# Patient Record
Sex: FEMALE | Race: BLACK OR AFRICAN AMERICAN | Employment: FULL TIME | URBAN - METROPOLITAN AREA
[De-identification: names, ages, dates, MRNs, and addresses within clinical notes are randomized per-mention and may not be internally consistent; named-entity substitution may affect disease eponyms.]

---

## 2017-08-11 ENCOUNTER — OFFICE VISIT (OUTPATIENT)
Dept: INTERNAL MEDICINE CLINIC | Age: 32
End: 2017-08-11

## 2017-08-11 VITALS
WEIGHT: 171 LBS | DIASTOLIC BLOOD PRESSURE: 62 MMHG | TEMPERATURE: 98.6 F | SYSTOLIC BLOOD PRESSURE: 110 MMHG | OXYGEN SATURATION: 99 % | BODY MASS INDEX: 30.3 KG/M2 | HEART RATE: 88 BPM | RESPIRATION RATE: 16 BRPM | HEIGHT: 63 IN

## 2017-08-11 DIAGNOSIS — Z98.84 S/P GASTRIC BYPASS: ICD-10-CM

## 2017-08-11 DIAGNOSIS — F51.02 ADJUSTMENT INSOMNIA: ICD-10-CM

## 2017-08-11 DIAGNOSIS — R23.4 SKIN TEXTURE CHANGES: ICD-10-CM

## 2017-08-11 DIAGNOSIS — Z00.00 WELL WOMAN EXAM (NO GYNECOLOGICAL EXAM): Primary | ICD-10-CM

## 2017-08-11 RX ORDER — LANOLIN ALCOHOL/MO/W.PET/CERES
325 CREAM (GRAM) TOPICAL
Qty: 30 TAB | Refills: 3 | Status: SHIPPED | OUTPATIENT
Start: 2017-08-11 | End: 2017-08-17

## 2017-08-11 RX ORDER — CHOLECALCIFEROL (VITAMIN D3) 125 MCG
1 CAPSULE ORAL DAILY
Qty: 30 TAB | Refills: 3 | Status: SHIPPED | OUTPATIENT
Start: 2017-08-11 | End: 2017-08-17

## 2017-08-11 RX ORDER — LANOLIN ALCOHOL/MO/W.PET/CERES
500 CREAM (GRAM) TOPICAL DAILY
Qty: 30 TAB | Refills: 3 | Status: SHIPPED | OUTPATIENT
Start: 2017-08-11 | End: 2017-08-17

## 2017-08-11 RX ORDER — PEDIATRIC MULTIVITAMIN NO.17
1 TABLET,CHEWABLE ORAL DAILY
Qty: 30 TAB | Refills: 3 | Status: SHIPPED | OUTPATIENT
Start: 2017-08-11 | End: 2017-08-17

## 2017-08-11 NOTE — PROGRESS NOTES
Ronald Saenz is a 27 y.o. female  Chief Complaint   Patient presents with   [address] Patient     1. Have you been to the ER, urgent care clinic since your last visit? Hospitalized since your last visit? No    2. Have you seen or consulted any other health care providers outside of the [address] [place] [address] since your last visit? Include any pap smears or colon screening.  No

## 2017-08-11 NOTE — MR AVS SNAPSHOT
Visit Information Date & Time Provider Department Dept. Phone Encounter #  
 8/11/2017 11:00 AM Nicole Guzman MD [facility] Internal Medicine [phone] [id number] Follow-up Instructions Return in about 3 months (around 11/11/2017) for Follow-up, Skin Changes &  Insomnia. Your Appointments 8/29/2017  8:30 AM  
[facility] Appt Note: n/p est pcp/cpe/fasting labs 07/03/2017[address] [place] [address]  
[address], [address] [address] Upcoming Health Maintenance Date Due INFLUENZA AGE 9 TO ADULT 8/1/2017 PAP AKA CERVICAL CYTOLOGY 8/11/2019 DTaP/Tdap/Td series (2 - Td) 9/18/2022 Allergies as of 8/11/2017  Review Complete On: 8/11/2017 By: Nicole Guzman MD  
 No Known Allergies Current Immunizations  Reviewed on 9/18/2012 Name Date TDAP Vaccine 9/18/2012 Not reviewed this visit You Were Diagnosed With   
  
 Codes Comments Well woman exam (no gynecological exam)    -  Primary ICD-10-CM: Z00.00 ICD-9-CM: V70.0 S/P gastric bypass     ICD-10-CM: G12.74 ICD-9-CM: V45.86 Skin texture changes     ICD-10-CM: R23.4 ICD-9-CM: 782.8 Adjustment insomnia     ICD-10-CM: F51.02 
ICD-9-CM: 307.41 Vitals BP Pulse Temp Resp Height(growth percentile) Weight(growth percentile) 110/62 (BP 1 Location: Right arm, BP Patient Position: Sitting) 88 98.6 °F (37 °C) (Oral) 16 5' 3\" (1.6 m) 171 lb (77.6 kg) LMP SpO2 BMI OB Status Smoking Status 07/31/2017 99% 30.29 kg/m2 Having regular periods Former Smoker BMI and BSA Data Body Mass Index Body Surface Area  
 30.29 kg/m 2 1.86 m 2 Preferred Pharmacy Pharmacy Name Phone [address] [facility], [address] [phone] Your Updated Medication List  
  
   
This list is accurate as of: 8/11/17 12:19 PM.  Always use your most recent med list.  
  
  
  
  
 ascorbic acid (vitamin C) 500 mg Chew Commonly known as:  VITAMIN C Take 1 Tab by mouth daily. cholecalciferol (vitamin D3) 2,000 unit Tab Commonly known as:  VITAMIN D3 Take 1 Tab by mouth daily. ferrous sulfate 325 mg (65 mg iron) tablet Commonly known as:  Iron (Ferrous Sulfate) Take 1 Tab by mouth Daily (before breakfast). MONONESSA (28) 0.25-35 mg-mcg Tab Generic drug:  norgestimate-ethinyl estradiol Take 1 Tab by mouth daily. pediatric multivitamins chewable tablet Take 1 Tab by mouth daily. Prescriptions Sent to Pharmacy Refills  
 pediatric multivitamins chewable tablet 3 Sig: Take 1 Tab by mouth daily. Class: Normal  
 Pharmacy: [facility], [address] AT [address] [place] [address] Ph #: [phone] Route: Oral  
 cholecalciferol, vitamin D3, (VITAMIN D3) 2,000 unit tab 3 Sig: Take 1 Tab by mouth daily. Class: Normal  
 Pharmacy: [facility], [address] AT [address] [place] [address] Ph #: [phone] Route: Oral  
 ferrous sulfate (IRON, FERROUS SULFATE,) 325 mg (65 mg iron) tablet 3 Sig: Take 1 Tab by mouth Daily (before breakfast). Class: Normal  
 Pharmacy: [facility], [address] AT [address] [place] [address] Ph #: [phone] Route: Oral  
 ascorbic acid, vitamin C, (VITAMIN C) 500 mg chew 3 Sig: Take 1 Tab by mouth daily. Class: Normal  
 Pharmacy: [facility], [address] AT [address] [place] [address] Ph #: [phone] Route: Oral  
  
We Performed the Following ELIZABET IFA W/REFLEX  CASCADE [35646 CPT(R)] CBC WITH AUTOMATED DIFF [68809 CPT(R)] FERRITIN [39057 CPT(R)] IRON PROFILE [id number] CPT(R)] METABOLIC PANEL, COMPREHENSIVE [12395 CPT(R)] REFERRAL TO SURGERY [WRN506 Custom] Comments:  
 Please evaluate patient for bariatric follow-up; need nutrition THYROID PANEL [id number] CPT(R)] TSH 3RD GENERATION [68665 CPT(R)] VITAMIN B1, WHOLE BLOOD [id number] CPT(R)] VITAMIN B12 & FOLATE [03092 CPT(R)] VITAMIN C, PLASMA [86111 CPT(R)] VITAMIN D, 25 HYDROXY [id number] CPT(R)] ZINC [id number] CPT(R)] Follow-up Instructions Return in about 3 months (around 11/11/2017) for Follow-up, Skin Changes &  Insomnia. Referral Information Referral ID Referred By Referred To  
  
 [id number] [facility]   
   [address], [address] [place] [address] Visits Status Start Date End Date 1 New Request 8/11/17 8/11/18 If your referral has a status of pending review or denied, additional information will be sent to support the outcome of this decision. Patient Instructions It was a pleasure to meet you! As discussed: 
 
Health Maintenance I have ordered your age appropriate labs please complete them. You will need to fast 10-12hrs before your appointment. Remember goal for exercise is 150minutes of moderate exercise a week and diet goal is to eat 50% fruits and vegetables with minimal sugar, fat and oil daily. See health.gov or choosemyplate.gov for more details. Your cervical cancer and breast cancer screening will be completed by your ob/ gyn as scheduled. Bariatric Surgery I have ordered labs to check your symptoms Schedule with the nutritionist  
Restart the vitamins ordered. Insomnia Improve sleep hygiene (see below). Try Diphenhydramine 25-50mg 2 hours before bedtime. If the Insomnia continues then we will discuss other options. Learning About Sleeping Well What does sleeping well mean? Sleeping well means getting enough sleep.  How much sleep is enough varies among people. The number of hours you sleep is not as important as how you feel when you wake up. If you do not feel refreshed, you probably need more sleep. Another sign of not getting enough sleep is feeling tired during the day. The average total nightly sleep time is 7½ to 8 hours. Healthy adults may need a little more or a little less than this. Why is getting enough sleep important? Getting enough quality sleep is a basic part of good health. When your sleep suffers, your mood and your thoughts can suffer too. You may find yourself feeling more grumpy or stressed. Not getting enough sleep also can lead to serious problems, including injury, accidents, anxiety, and depression. What might cause poor sleeping? Many things can cause sleep problems, including: · Stress. Stress can be caused by fear about a single event, such as giving a speech. Or you may have ongoing stress, such as worry about work or school. · Depression, anxiety, and other mental or emotional conditions. · Changes in your sleep habits or surroundings. This includes changes that happen where you sleep, such as noise, light, or sleeping in a different bed. It also includes changes in your sleep pattern, such as having jet lag or working a late shift. · Health problems, such as pain, breathing problems, and restless legs syndrome. · Lack of regular exercise. How can you help yourself? Here are some tips that may help you sleep more soundly and wake up feeling more refreshed. Your sleeping area · Use your bedroom only for sleeping and sex. A bit of light reading may help you fall asleep. But if it doesn't, do your reading elsewhere in the house. Don't watch TV in bed. · Be sure your bed is big enough to stretch out comfortably, especially if you have a sleep partner. · Keep your bedroom quiet, dark, and cool. Use curtains, blinds, or a sleep mask to block out light.  To block out noise, use earplugs, soothing music, or a \"white noise\" machine. Your evening and bedtime routine · Create a relaxing bedtime routine. You might want to take a warm shower or bath, listen to soothing music, or drink a cup of noncaffeinated tea. · Go to bed at the same time every night. And get up at the same time every morning, even if you feel tired. What to avoid · Limit caffeine (coffee, tea, caffeinated sodas) during the day, and don't have any for at least 4 to 6 hours before bedtime. · Don't drink alcohol before bedtime. Alcohol can cause you to wake up more often during the night. · Don't smoke or use tobacco, especially in the evening. Nicotine can keep you awake. · Don't take naps during the day, especially close to bedtime. · Don't lie in bed awake for too long. If you can't fall asleep, or if you wake up in the middle of the night and can't get back to sleep within 15 minutes or so, get out of bed and go to another room until you feel sleepy. · Don't take medicine right before bed that may keep you awake or make you feel hyper or energized. Your doctor can tell you if your medicine may do this and if you can take it earlier in the day. If you can't sleep · Imagine yourself in a peaceful, pleasant scene. Focus on the details and feelings of being in a place that is relaxing. · Get up and do a quiet or boring activity until you feel sleepy. · Don't drink any liquids after 6 p.m. if you wake up often because you have to go to the bathroom. Where can you learn more? Go to http://aysha-sherron.info/. Enter F195 in the search box to learn more about \"Learning About Sleeping Well. \" Current as of: July 26, 2016 Content Version: 11.3 © 6750-3825 [employer]. Care instructions adapted under license by [employer] (which disclaims liability or warranty for this information).  If you have questions about a medical condition or this instruction, always ask your healthcare professional. [place] [address] any warranty or liability for your use of this information. Laparoscopic Gastric Banding: What to Expect at Home Your Recovery Laparoscopic gastric banding is surgery to make the stomach smaller. It is done to help people lose weight. The surgery limits the amount of food the stomach can hold. This helps you eat less and feel full sooner. The cuts (incisions) the doctor made in your belly will probably be sore for a few days after the surgery. If you have stitches, the doctor will take these out at your follow-up visit. After this surgery, weight loss is usually gradual but steady. You will have regular appointments with your doctor to check how you are doing. The doctor can adjust the band if you are not losing weight as expected, or if you have problems with the band. Some people continue to lose weight for 4 or 5 years after surgery. It is important to think of this surgery as a tool to help you lose weight. It is not an instant fix. You will still need to eat a healthy diet and get regular exercise. This will help you reach your weight goal and avoid regaining the weight you lose. It is common to have many different emotions after this surgery. You may feel happy or excited as you begin to lose weight. But you may also feel overwhelmed or frustrated by the changes that you have to make in your diet, activity, and lifestyle. Talk with your doctor if you have concerns or questions. This care sheet gives you a general idea about how long it will take for you to recover. But each person recovers at a different pace. Follow the steps below to get better as quickly as possible. How can you care for yourself at home? Activity · Rest when you feel tired. Getting enough sleep will help you recover. · Try to walk each day.  Start by walking a little more than you did the day before. Bit by bit, increase the amount you walk. Walking boosts blood flow and helps prevent pneumonia and constipation. · Avoid strenuous activities, such as bicycle riding, jogging, weight lifting, or aerobic exercise, until your doctor says it is okay. · Until your doctor says it is okay, avoid lifting anything that would make you strain. This may include a child, heavy grocery bags and milk containers, a heavy briefcase or backpack, cat litter or dog food bags, or a vacuum . · Hold a pillow over your incisions when you cough or take deep breaths. This will support your belly and decrease your pain. · Do breathing exercises at home as instructed by your doctor. This will help prevent pneumonia. · Ask your doctor when you can drive again. · You will probably need to take 2 to 4 weeks off from work. It depends on the type of work you do and how you feel. · You may shower, if your doctor okays it. Pat the incisions dry. Do not take a bath for the first 2 weeks, or until your doctor tells you it is okay. · Ask your doctor when it is okay for you to have sex. Diet · Your doctor will give you specific instructions about what to eat after the surgery. For the first 2 weeks, you will need to follow a liquid or soft diet. Bit by bit, you will be able to add solid foods back into your diet. · Your doctor may recommend that you work with a dietitian to plan healthy meals that give you enough protein, vitamins, and minerals while you are losing weight. Even with a healthy diet, you probably will need to take vitamin and mineral supplements for the rest of your life. · At first you may feel full after just a few sips of water or other liquid. It is important to try to sip water throughout the day to avoid becoming dehydrated. · You may notice that your bowel movements are not regular right after your surgery. This is common. Try to avoid constipation and straining with bowel movements. Medicines · Your doctor will tell you if and when you can restart your medicines. He or she will also give you instructions about taking any new medicines. · If you take blood thinners, such as warfarin (Coumadin), clopidogrel (Plavix), or aspirin, be sure to talk to your doctor. He or she will tell you if and when to start taking those medicines again. Make sure that you understand exactly what your doctor wants you to do. · Be safe with medicines. Take pain medicines exactly as directed. ¨ If the doctor gave you a prescription medicine for pain, take it as prescribed. ¨ If you are not taking a prescription pain medicine, ask your doctor if you can take an over-the-counter medicine. · If you think your pain medicine is making you sick to your stomach: 
¨ Take your medicine after meals (unless your doctor has told you not to). ¨ Ask your doctor for a different pain medicine. · If your doctor prescribed antibiotics, take them as directed. Do not stop taking them just because you feel better. You need to take the full course of antibiotics. Incision care · If you have strips of tape on the incisions, leave the tape on for a week or until it falls off. · Wash the area daily with warm, soapy water, and pat it dry. Don't use hydrogen peroxide or alcohol, which can slow healing. You may cover the area with a gauze bandage if it weeps or rubs against clothing. Change the bandage every day. · Keep the area clean and dry. Follow-up care is a key part of your treatment and safety. Be sure to make and go to all appointments, and call your doctor if you are having problems. It's also a good idea to know your test results and keep a list of the medicines you take. When should you call for help? Call 911 anytime you think you may need emergency care. For example, call if: 
· You passed out (lost consciousness). · You are short of breath. Call your doctor now or seek immediate medical care if: · You have pain that does not get better after you take pain medicine. · You cannot pass stool or gas. · You are sick to your stomach and cannot drink fluids. · You have loose stitches, or your incision comes open. · You have signs of a blood clot, such as: 
¨ Pain in your calf, back of the knee, thigh, or groin. ¨ Redness and swelling in your leg or groin. · You have signs of infection, such as: 
¨ Increased pain, swelling, warmth, or redness. ¨ Red streaks leading from the incision. ¨ Pus draining from the incision. ¨ A fever. Watch closely for changes in your health, and be sure to contact your doctor if you have any problems. Where can you learn more? Go to http://aysha-sherron.info/. Enter U011 in the search box to learn more about \"Laparoscopic Gastric Banding: What to Expect at Home. \" Current as of: October 13, 2016 Content Version: 11.3 © 7134-8584 [employer]. Care instructions adapted under license by [employer] (which disclaims liability or warranty for this information). If you have questions about a medical condition or this instruction, always ask your healthcare professional. [place] [address] any warranty or liability for your use of this information. Introducing [facility]! Dear Sadaf Parker: Thank you for requesting a Glipho account. Our records indicate that you already have an active Glipho account. You can access your account anytime at [email or web address] Did you know that you can access your hospital and ER discharge instructions at any time in Glipho? You can also review all of your test results from your hospital stay or ER visit. Additional Information If you have questions, please visit the Frequently Asked Questions section of the Glipho website at [email or web address] Remember, Glipho is NOT to be used for urgent needs.  For medical emergencies, dial 911. Now available from your iPhone and Android! Please provide this summary of care documentation to your next provider. Your primary care clinician is listed as James Cool. If you have any questions after today's visit, please call [phone].

## 2017-08-11 NOTE — PATIENT INSTRUCTIONS
It was a pleasure to meet you! As discussed:    Health Maintenance   I have ordered your age appropriate labs please complete them. You will need to fast 10-12hrs before your appointment. Remember goal for exercise is 150minutes of moderate exercise a week and diet goal is to eat 50% fruits and vegetables with minimal sugar, fat and oil daily. See health.gov or choosemyplate.gov for more details. Your cervical cancer and breast cancer screening will be completed by your ob/ gyn as scheduled. Bariatric Surgery  I have ordered labs to check your symptoms  Schedule with the nutritionist   Restart the vitamins ordered. Insomnia  Improve sleep hygiene (see below). Try Diphenhydramine 25-50mg 2 hours before bedtime. If the Insomnia continues then we will discuss other options. Learning About Sleeping Well  What does sleeping well mean? Sleeping well means getting enough sleep. How much sleep is enough varies among people. The number of hours you sleep is not as important as how you feel when you wake up. If you do not feel refreshed, you probably need more sleep. Another sign of not getting enough sleep is feeling tired during the day. The average total nightly sleep time is 7½ to 8 hours. Healthy adults may need a little more or a little less than this. Why is getting enough sleep important? Getting enough quality sleep is a basic part of good health. When your sleep suffers, your mood and your thoughts can suffer too. You may find yourself feeling more grumpy or stressed. Not getting enough sleep also can lead to serious problems, including injury, accidents, anxiety, and depression. What might cause poor sleeping? Many things can cause sleep problems, including:  · Stress. Stress can be caused by fear about a single event, such as giving a speech. Or you may have ongoing stress, such as worry about work or school. · Depression, anxiety, and other mental or emotional conditions.   · Changes in your sleep habits or surroundings. This includes changes that happen where you sleep, such as noise, light, or sleeping in a different bed. It also includes changes in your sleep pattern, such as having jet lag or working a late shift. · Health problems, such as pain, breathing problems, and restless legs syndrome. · Lack of regular exercise. How can you help yourself? Here are some tips that may help you sleep more soundly and wake up feeling more refreshed. Your sleeping area  · Use your bedroom only for sleeping and sex. A bit of light reading may help you fall asleep. But if it doesn't, do your reading elsewhere in the house. Don't watch TV in bed. · Be sure your bed is big enough to stretch out comfortably, especially if you have a sleep partner. · Keep your bedroom quiet, dark, and cool. Use curtains, blinds, or a sleep mask to block out light. To block out noise, use earplugs, soothing music, or a \"white noise\" machine. Your evening and bedtime routine  · Create a relaxing bedtime routine. You might want to take a warm shower or bath, listen to soothing music, or drink a cup of noncaffeinated tea. · Go to bed at the same time every night. And get up at the same time every morning, even if you feel tired. What to avoid  · Limit caffeine (coffee, tea, caffeinated sodas) during the day, and don't have any for at least 4 to 6 hours before bedtime. · Don't drink alcohol before bedtime. Alcohol can cause you to wake up more often during the night. · Don't smoke or use tobacco, especially in the evening. Nicotine can keep you awake. · Don't take naps during the day, especially close to bedtime. · Don't lie in bed awake for too long. If you can't fall asleep, or if you wake up in the middle of the night and can't get back to sleep within 15 minutes or so, get out of bed and go to another room until you feel sleepy.   · Don't take medicine right before bed that may keep you awake or make you feel hyper or energized. Your doctor can tell you if your medicine may do this and if you can take it earlier in the day. If you can't sleep  · Imagine yourself in a peaceful, pleasant scene. Focus on the details and feelings of being in a place that is relaxing. · Get up and do a quiet or boring activity until you feel sleepy. · Don't drink any liquids after 6 p.m. if you wake up often because you have to go to the bathroom. Where can you learn more? Go to http://aysha-sherron.info/. Enter S003 in the search box to learn more about \"Learning About Sleeping Well. \"  Current as of: July 26, 2016  Content Version: 11.3  © 0427-5714 [employer]. Care instructions adapted under license by [employer] (which disclaims liability or warranty for this information). If you have questions about a medical condition or this instruction, always ask your healthcare professional. [place] [address] any warranty or liability for your use of this information. Laparoscopic Gastric Banding: What to Expect at Home  Your Recovery  Laparoscopic gastric banding is surgery to make the stomach smaller. It is done to help people lose weight. The surgery limits the amount of food the stomach can hold. This helps you eat less and feel full sooner. The cuts (incisions) the doctor made in your belly will probably be sore for a few days after the surgery. If you have stitches, the doctor will take these out at your follow-up visit. After this surgery, weight loss is usually gradual but steady. You will have regular appointments with your doctor to check how you are doing. The doctor can adjust the band if you are not losing weight as expected, or if you have problems with the band. Some people continue to lose weight for 4 or 5 years after surgery. It is important to think of this surgery as a tool to help you lose weight. It is not an instant fix.  You will still need to eat a healthy diet and get regular exercise. This will help you reach your weight goal and avoid regaining the weight you lose. It is common to have many different emotions after this surgery. You may feel happy or excited as you begin to lose weight. But you may also feel overwhelmed or frustrated by the changes that you have to make in your diet, activity, and lifestyle. Talk with your doctor if you have concerns or questions. This care sheet gives you a general idea about how long it will take for you to recover. But each person recovers at a different pace. Follow the steps below to get better as quickly as possible. How can you care for yourself at home? Activity  · Rest when you feel tired. Getting enough sleep will help you recover. · Try to walk each day. Start by walking a little more than you did the day before. Bit by bit, increase the amount you walk. Walking boosts blood flow and helps prevent pneumonia and constipation. · Avoid strenuous activities, such as bicycle riding, jogging, weight lifting, or aerobic exercise, until your doctor says it is okay. · Until your doctor says it is okay, avoid lifting anything that would make you strain. This may include a child, heavy grocery bags and milk containers, a heavy briefcase or backpack, cat litter or dog food bags, or a vacuum . · Hold a pillow over your incisions when you cough or take deep breaths. This will support your belly and decrease your pain. · Do breathing exercises at home as instructed by your doctor. This will help prevent pneumonia. · Ask your doctor when you can drive again. · You will probably need to take 2 to 4 weeks off from work. It depends on the type of work you do and how you feel. · You may shower, if your doctor okays it. Pat the incisions dry. Do not take a bath for the first 2 weeks, or until your doctor tells you it is okay. · Ask your doctor when it is okay for you to have sex.   Diet  · Your doctor will give you specific instructions about what to eat after the surgery. For the first 2 weeks, you will need to follow a liquid or soft diet. Bit by bit, you will be able to add solid foods back into your diet. · Your doctor may recommend that you work with a dietitian to plan healthy meals that give you enough protein, vitamins, and minerals while you are losing weight. Even with a healthy diet, you probably will need to take vitamin and mineral supplements for the rest of your life. · At first you may feel full after just a few sips of water or other liquid. It is important to try to sip water throughout the day to avoid becoming dehydrated. · You may notice that your bowel movements are not regular right after your surgery. This is common. Try to avoid constipation and straining with bowel movements. Medicines  · Your doctor will tell you if and when you can restart your medicines. He or she will also give you instructions about taking any new medicines. · If you take blood thinners, such as warfarin (Coumadin), clopidogrel (Plavix), or aspirin, be sure to talk to your doctor. He or she will tell you if and when to start taking those medicines again. Make sure that you understand exactly what your doctor wants you to do. · Be safe with medicines. Take pain medicines exactly as directed. ¨ If the doctor gave you a prescription medicine for pain, take it as prescribed. ¨ If you are not taking a prescription pain medicine, ask your doctor if you can take an over-the-counter medicine. · If you think your pain medicine is making you sick to your stomach:  ¨ Take your medicine after meals (unless your doctor has told you not to). ¨ Ask your doctor for a different pain medicine. · If your doctor prescribed antibiotics, take them as directed. Do not stop taking them just because you feel better. You need to take the full course of antibiotics.   Incision care  · If you have strips of tape on the incisions, leave the tape on for a week or until it falls off. · Wash the area daily with warm, soapy water, and pat it dry. Don't use hydrogen peroxide or alcohol, which can slow healing. You may cover the area with a gauze bandage if it weeps or rubs against clothing. Change the bandage every day. · Keep the area clean and dry. Follow-up care is a key part of your treatment and safety. Be sure to make and go to all appointments, and call your doctor if you are having problems. It's also a good idea to know your test results and keep a list of the medicines you take. When should you call for help? Call 911 anytime you think you may need emergency care. For example, call if:  · You passed out (lost consciousness). · You are short of breath. Call your doctor now or seek immediate medical care if:  · You have pain that does not get better after you take pain medicine. · You cannot pass stool or gas. · You are sick to your stomach and cannot drink fluids. · You have loose stitches, or your incision comes open. · You have signs of a blood clot, such as:  ¨ Pain in your calf, back of the knee, thigh, or groin. ¨ Redness and swelling in your leg or groin. · You have signs of infection, such as:  ¨ Increased pain, swelling, warmth, or redness. ¨ Red streaks leading from the incision. ¨ Pus draining from the incision. ¨ A fever. Watch closely for changes in your health, and be sure to contact your doctor if you have any problems. Where can you learn more? Go to http://aysha-sherron.info/. Enter U011 in the search box to learn more about \"Laparoscopic Gastric Banding: What to Expect at Home. \"  Current as of: October 13, 2016  Content Version: 11.3  © 9981-1966 [employer]. Care instructions adapted under license by [employer] (which disclaims liability or warranty for this information).  If you have questions about a medical condition or this instruction, always ask your healthcare professional. [address] any warranty or liability for your use of this information.

## 2017-08-11 NOTE — PROGRESS NOTES
HISTORY OF PRESENT ILLNESS  Tiffanie Medina is a 27 y.o. female. HPI  New Patient  Tiffanie Medina is a new patient. Prior care:  She has not seen Dr. Renee Collins in several years. Has been seeing her gynecologist for primary care. Her gynecologist Dr. Kesha Gage. 8/2017. Records have been requested. ER Visits/ Hospitalizations: None     Health Maintenance  Breast Cancer screening: h/o breast biopsies 10 yrs ago. No FHx Breast CA   Colorectal Cancer screening: Not indicated. Cervical Cancer screening: Up to date, completed by gynecologist.      Cardiovascular Review:  The patient has obesity s/p gastric bypass (sleeve converted to [address]). Has not been taking MVI since 2014. Has notice skin changes and hair changes. Diet and Lifestyle: exercises sporadically, nonsmoker, has active job. Skips meals   Home BP Monitoring: is not measured at home. Pertinent ROS: no TIA's, no chest pain on exertion, no dyspnea on exertion, no swelling of ankles. SHx: , 3 children-3,6, 16. . Review of Systems   Constitutional: Negative for chills, fever and weight loss. HENT: Negative for congestion and sore throat. Eyes: Negative for blurred vision and double vision. Respiratory: Negative for cough and shortness of breath. Cardiovascular: Negative for chest pain, orthopnea and leg swelling. Gastrointestinal: Negative for abdominal pain, blood in stool, constipation, diarrhea, heartburn, nausea and vomiting. Genitourinary: Negative for frequency and urgency. Musculoskeletal: Negative for joint pain and myalgias. Neurological: Negative for dizziness, tremors, weakness and headaches. Endo/Heme/Allergies: Does not bruise/bleed easily. Psychiatric/Behavioral: Negative for depression and suicidal ideas. There are no active problems to display for this patient.       Current Outpatient Prescriptions   Medication Sig Dispense Refill    pediatric multivitamins chewable tablet Take 1 Tab by mouth daily.  cholecalciferol, vitamin D3, (VITAMIN D3) 2,000 unit Tab Take  by mouth.  ferrous sulfate (IRON, FERROUS SULFATE,) 325 mg (65 mg iron) tablet Take  by mouth Daily (before breakfast).  norgestimate-ethinyl estradiol (MONONESSA, 28,) 0.25-35 mg-mcg per tablet Take 1 Tab by mouth daily. No Known Allergies   Visit Vitals    /62 (BP 1 Location: Right arm, BP Patient Position: Sitting)    Pulse 88    Temp 98.6 °F (37 °C) (Oral)    Resp 16    Ht 5' 3\" (1.6 m)    Wt 171 lb (77.6 kg)    SpO2 99%    BMI 30.29 kg/m2       Physical Exam   Constitutional: She is oriented to person, place, and time. She appears well-developed and well-nourished. HENT:   Right Ear: External ear normal.   Left Ear: External ear normal.   Mouth/Throat: Oropharynx is clear and moist. No oropharyngeal exudate. Eyes: Conjunctivae are normal. No scleral icterus. Neck: Normal range of motion. Neck supple. No thyromegaly present. Cardiovascular: Normal rate, regular rhythm and normal heart sounds. Exam reveals no gallop and no friction rub. No murmur heard. Pulmonary/Chest: Effort normal and breath sounds normal. No respiratory distress. She has no wheezes. She has no rales. She exhibits no tenderness. Abdominal: Soft. Bowel sounds are normal. She exhibits no distension. There is no tenderness. There is no rebound and no guarding. Genitourinary: Rectal exam shows guaiac negative stool. Genitourinary Comments: Deferred for gyn per pt request   Musculoskeletal: Normal range of motion. She exhibits no edema or tenderness. Neurological: She is alert and oriented to person, place, and time. ASSESSMENT and PLAN  Diagnoses and all orders for this visit:    1. Well woman exam (no gynecological exam)- Health Maintenance reviewed - patient asked to schedule her pap smear, (advised of need to have breast exam in addition to mammogram).  Has gynecology appt next week to discuss R breast growth then       2. S/P gastric bypass- has not been on appropriate treatment. Check labs given sx. Concerning for iron and vit c deficiency. Resume vitamin regimen. See bariatrics Red flags to warrant ER or earlier clinical evaluation reviewed. -     REFERRAL TO SURGERY  -     CBC WITH AUTOMATED DIFF  -     METABOLIC PANEL, COMPREHENSIVE  -     VITAMIN D, 25 HYDROXY  -     IRON PROFILE  -     VITAMIN B12 & FOLATE  -     FERRITIN  -     ZINC  -     VITAMIN C, PLASMA  -     TSH 3RD GENERATION  -     THYROID PANEL  -     ELIZABET IFA W/REFLEX  CASCADE  -     pediatric multivitamins chewable tablet; Take 1 Tab by mouth daily. -     VITAMIN B1, WHOLE BLOOD  -     cholecalciferol, vitamin D3, (VITAMIN D3) 2,000 unit tab; Take 1 Tab by mouth daily. -     ferrous sulfate (IRON, FERROUS SULFATE,) 325 mg (65 mg iron) tablet; Take 1 Tab by mouth Daily (before breakfast). -     ascorbic acid, vitamin C, (VITAMIN C) 500 mg chew; Take 1 Tab by mouth daily. 3. Skin texture changes  -     TSH 3RD GENERATION  -     THYROID PANEL  -     ELIZABET IFA W/REFLEX  CASCADE  -     VITAMIN B1, WHOLE BLOOD    4. Adjustment insomnia- optimize sleep hygiene. Prn benadryl. Follow-up Disposition:  Return in about 3 months (around 11/11/2017) for Follow-up, Skin Changes &  Insomnia. Medication risks/benefits/costs/interactions/alternatives discussed with patient. Rosalee Villafuerte  was given an after visit summary which includes diagnoses, current medications, & vitals. she expressed understanding with the diagnosis and plan.

## 2017-08-16 LAB
25(OH)D3+25(OH)D2 SERPL-MCNC: 5.1 NG/ML (ref 30–100)
ALBUMIN SERPL-MCNC: 4.3 G/DL (ref 3.5–5.5)
ALBUMIN/GLOB SERPL: 1.7 {RATIO} (ref 1.2–2.2)
ALP SERPL-CCNC: 53 IU/L (ref 39–117)
ALT SERPL-CCNC: 11 IU/L (ref 0–32)
AST SERPL-CCNC: 14 IU/L (ref 0–40)
BASOPHILS # BLD AUTO: 0 X10E3/UL (ref 0–0.2)
BASOPHILS NFR BLD AUTO: 1 %
BILIRUB SERPL-MCNC: <0.2 MG/DL (ref 0–1.2)
BUN SERPL-MCNC: 6 MG/DL (ref 6–20)
BUN/CREAT SERPL: 14 (ref 9–23)
CALCIUM SERPL-MCNC: 8.5 MG/DL (ref 8.7–10.2)
CHLORIDE SERPL-SCNC: 102 MMOL/L (ref 96–106)
CO2 SERPL-SCNC: 23 MMOL/L (ref 18–29)
CREAT SERPL-MCNC: 0.44 MG/DL (ref 0.57–1)
EOSINOPHIL # BLD AUTO: 0.1 X10E3/UL (ref 0–0.4)
EOSINOPHIL NFR BLD AUTO: 2 %
ERYTHROCYTE [DISTWIDTH] IN BLOOD BY AUTOMATED COUNT: 19.5 % (ref 12.3–15.4)
FERRITIN SERPL-MCNC: 4 NG/ML (ref 15–150)
FOLATE SERPL-MCNC: 5.1 NG/ML
FT4I SERPL CALC-MCNC: 1.5 (ref 1.2–4.9)
GLOBULIN SER CALC-MCNC: 2.6 G/DL (ref 1.5–4.5)
GLUCOSE SERPL-MCNC: 75 MG/DL (ref 65–99)
HCT VFR BLD AUTO: 23.2 % (ref 34–46.6)
HGB BLD-MCNC: 6.2 G/DL (ref 11.1–15.9)
IMM GRANULOCYTES # BLD: 0 X10E3/UL (ref 0–0.1)
IMM GRANULOCYTES NFR BLD: 0 %
IRON SATN MFR SERPL: 3 % (ref 15–55)
IRON SERPL-MCNC: 13 UG/DL (ref 27–159)
LYMPHOCYTES # BLD AUTO: 1.1 X10E3/UL (ref 0.7–3.1)
LYMPHOCYTES NFR BLD AUTO: 32 %
MCH RBC QN AUTO: 18.5 PG (ref 26.6–33)
MCHC RBC AUTO-ENTMCNC: 26.7 G/DL (ref 31.5–35.7)
MCV RBC AUTO: 69 FL (ref 79–97)
MONOCYTES # BLD AUTO: 0.4 X10E3/UL (ref 0.1–0.9)
MONOCYTES NFR BLD AUTO: 12 %
NEUTROPHILS # BLD AUTO: 1.7 X10E3/UL (ref 1.4–7)
NEUTROPHILS NFR BLD AUTO: 53 %
PLATELET # BLD AUTO: 653 X10E3/UL (ref 150–379)
POTASSIUM SERPL-SCNC: 4.6 MMOL/L (ref 3.5–5.2)
PROT SERPL-MCNC: 6.9 G/DL (ref 6–8.5)
RBC # BLD AUTO: 3.35 X10E6/UL (ref 3.77–5.28)
SODIUM SERPL-SCNC: 139 MMOL/L (ref 134–144)
T3RU NFR SERPL: 24 % (ref 24–39)
T4 SERPL-MCNC: 6.2 UG/DL (ref 4.5–12)
TIBC SERPL-MCNC: 441 UG/DL (ref 250–450)
TSH SERPL DL<=0.005 MIU/L-ACNC: 1.77 UIU/ML (ref 0.45–4.5)
UIBC SERPL-MCNC: 428 UG/DL (ref 131–425)
VIT B12 SERPL-MCNC: 283 PG/ML (ref 211–946)
VIT C SERPL-MCNC: 1.8 MG/DL (ref 0.2–2)
WBC # BLD AUTO: 3.3 X10E3/UL (ref 3.4–10.8)
ZINC SERPL-MCNC: 83 UG/DL (ref 56–134)

## 2017-08-17 ENCOUNTER — TELEPHONE (OUTPATIENT)
Dept: INTERNAL MEDICINE CLINIC | Age: 32
End: 2017-08-17

## 2017-08-17 ENCOUNTER — HOSPITAL ENCOUNTER (OUTPATIENT)
Age: 32
Setting detail: OBSERVATION
Discharge: HOME | End: 2017-08-19
Attending: EMERGENCY MEDICINE | Admitting: FAMILY MEDICINE
Payer: COMMERCIAL

## 2017-08-17 ENCOUNTER — APPOINTMENT (OUTPATIENT)
Dept: CT IMAGING | Age: 32
End: 2017-08-17
Attending: FAMILY MEDICINE
Payer: COMMERCIAL

## 2017-08-17 DIAGNOSIS — D64.9 ANEMIA, UNSPECIFIED TYPE: Primary | ICD-10-CM

## 2017-08-17 LAB
ALBUMIN SERPL-MCNC: 3.8 G/DL (ref 3.5–5)
ALBUMIN/GLOB SERPL: 0.9 {RATIO} (ref 1.1–2.2)
ALP SERPL-CCNC: 64 U/L (ref 45–117)
ALT SERPL-CCNC: 18 U/L (ref 12–78)
ANION GAP SERPL CALC-SCNC: 7 MMOL/L (ref 5–15)
APPEARANCE UR: ABNORMAL
AST SERPL-CCNC: 7 U/L (ref 15–37)
BACTERIA URNS QL MICRO: ABNORMAL /HPF
BASOPHILS # BLD: 0.1 K/UL (ref 0–0.1)
BASOPHILS NFR BLD: 1 % (ref 0–1)
BILIRUB SERPL-MCNC: 0.2 MG/DL (ref 0.2–1)
BILIRUB UR QL: NEGATIVE
BUN SERPL-MCNC: 8 MG/DL (ref 6–20)
BUN/CREAT SERPL: 11 (ref 12–20)
CALCIUM SERPL-MCNC: 8.3 MG/DL (ref 8.5–10.1)
CHLORIDE SERPL-SCNC: 106 MMOL/L (ref 97–108)
CO2 SERPL-SCNC: 26 MMOL/L (ref 21–32)
COLOR UR: ABNORMAL
CREAT SERPL-MCNC: 0.7 MG/DL (ref 0.55–1.02)
DIFFERENTIAL METHOD BLD: ABNORMAL
EOSINOPHIL # BLD: 0.1 K/UL (ref 0–0.4)
EOSINOPHIL NFR BLD: 2 % (ref 0–7)
EPITH CASTS URNS QL MICRO: ABNORMAL /LPF
ERYTHROCYTE [DISTWIDTH] IN BLOOD BY AUTOMATED COUNT: 19.5 % (ref 11.5–14.5)
GLOBULIN SER CALC-MCNC: 4.3 G/DL (ref 2–4)
GLUCOSE SERPL-MCNC: 125 MG/DL (ref 65–100)
GLUCOSE UR STRIP.AUTO-MCNC: NEGATIVE MG/DL
HCG UR QL: NEGATIVE
HCT VFR BLD AUTO: 22.9 % (ref 35–47)
HGB BLD-MCNC: 6.5 G/DL (ref 11.5–16)
HGB UR QL STRIP: NEGATIVE
HYALINE CASTS URNS QL MICRO: ABNORMAL /LPF (ref 0–5)
IRON SATN MFR SERPL: 4 % (ref 20–50)
IRON SERPL-MCNC: 19 UG/DL (ref 35–150)
KETONES UR QL STRIP.AUTO: NEGATIVE MG/DL
LEUKOCYTE ESTERASE UR QL STRIP.AUTO: NEGATIVE
LIPASE SERPL-CCNC: 139 U/L (ref 73–393)
LYMPHOCYTES # BLD: 1.6 K/UL (ref 0.8–3.5)
LYMPHOCYTES NFR BLD: 31 % (ref 12–49)
MCH RBC QN AUTO: 18.9 PG (ref 26–34)
MCHC RBC AUTO-ENTMCNC: 28.4 G/DL (ref 30–36.5)
MCV RBC AUTO: 66.6 FL (ref 80–99)
MONOCYTES # BLD: 0.5 K/UL (ref 0–1)
MONOCYTES NFR BLD: 10 % (ref 5–13)
NEUTS SEG # BLD: 2.7 K/UL (ref 1.8–8)
NEUTS SEG NFR BLD: 56 % (ref 32–75)
NITRITE UR QL STRIP.AUTO: POSITIVE
PH UR STRIP: 6.5 [PH] (ref 5–8)
PLATELET # BLD AUTO: 412 K/UL (ref 150–400)
PLATELET COMMENTS,PCOM: ABNORMAL
POTASSIUM SERPL-SCNC: 3.5 MMOL/L (ref 3.5–5.1)
PROT SERPL-MCNC: 8.1 G/DL (ref 6.4–8.2)
PROT UR STRIP-MCNC: NEGATIVE MG/DL
RBC # BLD AUTO: 3.44 M/UL (ref 3.8–5.2)
RBC #/AREA URNS HPF: ABNORMAL /HPF (ref 0–5)
RBC MORPH BLD: ABNORMAL
SODIUM SERPL-SCNC: 139 MMOL/L (ref 136–145)
SP GR UR REFRACTOMETRY: 1.01 (ref 1–1.03)
TIBC SERPL-MCNC: 476 UG/DL (ref 250–450)
UR CULT HOLD, URHOLD: NORMAL
UROBILINOGEN UR QL STRIP.AUTO: 1 EU/DL (ref 0.2–1)
VIT B1 BLD-SCNC: 98.4 NMOL/L (ref 66.5–200)
WBC # BLD AUTO: 5 K/UL (ref 3.6–11)
WBC URNS QL MICRO: ABNORMAL /HPF (ref 0–4)

## 2017-08-17 PROCEDURE — 83540 ASSAY OF IRON: CPT | Performed by: FAMILY MEDICINE

## 2017-08-17 PROCEDURE — 81025 URINE PREGNANCY TEST: CPT

## 2017-08-17 PROCEDURE — 99284 EMERGENCY DEPT VISIT MOD MDM: CPT

## 2017-08-17 PROCEDURE — 81001 URINALYSIS AUTO W/SCOPE: CPT | Performed by: EMERGENCY MEDICINE

## 2017-08-17 PROCEDURE — 80053 COMPREHEN METABOLIC PANEL: CPT | Performed by: EMERGENCY MEDICINE

## 2017-08-17 PROCEDURE — 85025 COMPLETE CBC W/AUTO DIFF WBC: CPT | Performed by: EMERGENCY MEDICINE

## 2017-08-17 PROCEDURE — 86900 BLOOD TYPING SEROLOGIC ABO: CPT | Performed by: PHYSICIAN ASSISTANT

## 2017-08-17 PROCEDURE — 99218 HC RM OBSERVATION: CPT

## 2017-08-17 PROCEDURE — 83690 ASSAY OF LIPASE: CPT | Performed by: FAMILY MEDICINE

## 2017-08-17 PROCEDURE — 86923 COMPATIBILITY TEST ELECTRIC: CPT | Performed by: PHYSICIAN ASSISTANT

## 2017-08-17 PROCEDURE — 86880 COOMBS TEST DIRECT: CPT | Performed by: PHYSICIAN ASSISTANT

## 2017-08-17 PROCEDURE — 74176 CT ABD & PELVIS W/O CONTRAST: CPT

## 2017-08-17 PROCEDURE — 36415 COLL VENOUS BLD VENIPUNCTURE: CPT | Performed by: EMERGENCY MEDICINE

## 2017-08-17 RX ORDER — LANOLIN ALCOHOL/MO/W.PET/CERES
1 CREAM (GRAM) TOPICAL 2 TIMES DAILY WITH MEALS
Status: DISCONTINUED | OUTPATIENT
Start: 2017-08-18 | End: 2017-08-19 | Stop reason: HOSPADM

## 2017-08-17 RX ORDER — SODIUM CHLORIDE 9 MG/ML
250 INJECTION, SOLUTION INTRAVENOUS AS NEEDED
Status: DISCONTINUED | OUTPATIENT
Start: 2017-08-17 | End: 2017-08-19 | Stop reason: HOSPADM

## 2017-08-17 RX ORDER — DIPHENHYDRAMINE HCL 25 MG
75 CAPSULE ORAL
COMMUNITY
End: 2017-11-19

## 2017-08-17 RX ORDER — MELATONIN
2000 DAILY
Status: DISCONTINUED | OUTPATIENT
Start: 2017-08-18 | End: 2017-08-19 | Stop reason: HOSPADM

## 2017-08-17 NOTE — ED TRIAGE NOTES
Pt. states she was sent to ER for low blood count, states she was told she is anemic and needs a blood transfusion. Pt. denies any complaints. Pt. states she feels like she is pale in color.

## 2017-08-17 NOTE — ED PROVIDER NOTES
HPI Comments: 28 yo female with hx of N/V and transient HTN here for evaluation of anemia. States she was evaluated by PCP for \"lightening of skin\" and some associated SOB with walking that has been progressive over the past year. Pt with hx of gastric bypass. Noted to have Hg 6.2 and Ferritin of 4. Sent to ER for blood transfusion. Denies rectal bleeding or heavy menses. Denies fever, CP, abd pain, flank pain, urinary symptoms. Patient is a 27 y.o. female presenting with abnormal lab results. The history is provided by the patient. Abnormal Lab Results   Associated symptoms include shortness of breath. Pertinent negatives include no chest pain and no abdominal pain. She has tried nothing for the symptoms. Past Medical History:   Diagnosis Date    Nausea & vomiting     Transient elevated blood pressure        Past Surgical History:   Procedure Laterality Date    HX BREAST BIOPSY      HX  SECTION  9/10    HX DILATION AND CURETTAGE      HX OTHER SURGICAL      gastric banding. Family History:   Problem Relation Age of Onset    Hypertension Mother     Diabetes Mother     Hypertension Father     Diabetes Father     Cancer Father      lung    Hypertension Brother     Hypertension Sister        Social History     Social History    Marital status:      Spouse name: N/A    Number of children: N/A    Years of education: N/A     Occupational History    Not on file. Social History Main Topics    Smoking status: Former Smoker     Years: 6.00     Quit date: 2013    Smokeless tobacco: Never Used      Comment: 2 cigarettes per day    Alcohol use 0.0 oz/week      Comment: occas    Drug use: No    Sexual activity: Yes     Partners: Male     Birth control/ protection: Pill     Other Topics Concern    Not on file     Social History Narrative         ALLERGIES: Review of patient's allergies indicates no known allergies.     Review of Systems   Constitutional: Negative for activity change and fever. HENT: Negative for facial swelling. Eyes: Negative for discharge. Respiratory: Positive for shortness of breath. Negative for cough. Cardiovascular: Negative for chest pain and leg swelling. Gastrointestinal: Negative for abdominal distention and abdominal pain. Skin: Positive for color change. Neurological: Negative for seizures and syncope. Psychiatric/Behavioral: Negative for behavioral problems. Vitals:    08/17/17 1710   BP: 135/87   Pulse: 95   Resp: 16   Temp: 98.3 °F (36.8 °C)   SpO2: 97%   Weight: 79.5 kg (175 lb 3.2 oz)   Height: 5' 2\" (1.575 m)            Physical Exam   Constitutional: She is oriented to person, place, and time. She appears well-developed and well-nourished. No distress. HENT:   Head: Normocephalic and atraumatic. Right Ear: External ear normal.   Left Ear: External ear normal.   Nose: Nose normal.   Mouth/Throat: Oropharynx is clear and moist.   Eyes: Conjunctivae and EOM are normal. Pupils are equal, round, and reactive to light. Right eye exhibits no discharge. Left eye exhibits no discharge. Neck: Normal range of motion. Neck supple. Cardiovascular: Normal rate, regular rhythm, normal heart sounds and intact distal pulses. Pulmonary/Chest: Effort normal and breath sounds normal.   Abdominal: Soft. Bowel sounds are normal. She exhibits no distension. There is no tenderness. There is no rebound and no guarding. Musculoskeletal: Normal range of motion. She exhibits no edema or tenderness. Neurological: She is alert and oriented to person, place, and time. No cranial nerve deficit. Coordination normal.   Skin: Skin is warm and dry. No rash noted. Psychiatric: She has a normal mood and affect. Her behavior is normal. Judgment and thought content normal.   Nursing note and vitals reviewed.        MDM  Number of Diagnoses or Management Options  Anemia, unspecified type:      Amount and/or Complexity of Data Reviewed  Clinical lab tests: ordered and reviewed  Discuss the patient with other providers: yes      ED Course       Procedures    Discussed case with attending Physician Raghavendra Lemus. Agrees with care and plan. HEATH Travis    Spoke to Dr Liang Mixon; will admit.  HEATH Travis

## 2017-08-17 NOTE — IP AVS SNAPSHOT
[address] 
[phone] Patient: Oneida Rueda MRN: [id number] GEQ:[id number] Current Discharge Medication List  
  
START taking these medications Dose & Instructions Dispensing Information Comments Morning Noon Evening Bedtime  
 cholecalciferol 1,000 unit tablet Commonly known as:  VITAMIN D3 Your last dose was: Your next dose is:    
   
   
 Dose:  2000 Units Take 2 Tabs by mouth daily for 30 days. Quantity:  60 Tab Refills:  0  
     
   
   
   
  
 cyanocobalamin 1,000 mcg tablet Your last dose was: Your next dose is:    
   
   
 Dose:  1000 mcg Take 1 Tab by mouth daily for 30 days. Quantity:  30 Tab Refills:  0  
     
   
   
   
  
 ferrous sulfate 325 mg (65 mg iron) tablet Your last dose was: Your next dose is:    
   
   
 Dose:  325 mg Take 1 Tab by mouth two (2) times daily (with meals) for 30 days. Quantity:  60 Tab Refills:  0  
     
   
   
   
  
 multivitamin, tx-iron-ca-min 9 mg iron-400 mcg Tab tablet Commonly known as:  THERA-M w/ IRON Your last dose was: Your next dose is:    
   
   
 Dose:  1 Tab Take 1 Tab by mouth daily for 30 days. Quantity:  30 Tab Refills:  0 CONTINUE these medications which have NOT CHANGED Dose & Instructions Dispensing Information Comments Morning Noon Evening Bedtime BENADRYL 25 mg capsule Generic drug:  diphenhydrAMINE Your last dose was: Your next dose is:    
   
   
 Dose:  75 mg Take 75 mg by mouth nightly as needed for Sleep. Refills:  0 Where to Get Your Medications These medications were sent to [facility] [facility] [address] [facility] [address] Phone:  [phone] cyanocobalamin 1,000 mcg tablet Information on where to get these meds will be given to you by the nurse or doctor. ! Ask your nurse or doctor about these medications  
  cholecalciferol 1,000 unit tablet  
 ferrous sulfate 325 mg (65 mg iron) tablet  
 multivitamin, tx-iron-ca-min 9 mg iron-400 mcg Tab tablet

## 2017-08-17 NOTE — PROGRESS NOTES
Admission Medication Reconciliation:    Information obtained from: patient    Significant PMH/Disease States:   Past Medical History:   Diagnosis Date    Nausea & vomiting     Transient elevated blood pressure        Chief Complaint for this Admission:    Chief Complaint   Patient presents with    Abnormal Lab Results         Allergies:  Review of patient's allergies indicates no known allergies. Prior to Admission Medications:   Prior to Admission Medications   Prescriptions Last Dose Informant Patient Reported? Taking? diphenhydrAMINE (BENADRYL) 25 mg capsule 8/16/2017 at Unknown time  Yes Yes   Sig: Take 75 mg by mouth nightly as needed for Sleep. Facility-Administered Medications: None         Comments/Recommendations: This medication history was obtained from patient; (s)he appears to be a good historian. An RX Query is not available. Medications added: benadryl qhs for sleep  Medications deleted: vitamin c, vitamin d, iron, mononessa, multivitamin  Medications amended: none    Last doses of medication: nothing today  Review of Allergies: completed      Thank you for allowing me to participate in the care of this patient. Please contact the pharmacy () or the medication reconciliation pharmacy () with any questions.     Naomy Nurse, PharmD

## 2017-08-17 NOTE — IP AVS SNAPSHOT
[address] [facility] [address] [place] [address] 
[phone] Patient: Oneida Rueda MRN: [id number] UDA:0/6/6297 You are allergic to the following No active allergies Recent Documentation Height Weight Breastfeeding? BMI OB Status Smoking Status 1.575 m 79.5 kg No 32.04 kg/m2 Having regular periods Former Smoker Unresulted Labs Order Current Status ELIZABET BY MULTIPLEX FLOW IA, QL In process FREE LIGHT CHAINS, KAPPA/LAMBDA, QT In process PROTEIN ELECTROPHORESIS In process SAMPLE TO BLOOD BANK In process IMMUNOELECTROPHORESIS [facility].) Preliminary result Emergency Contacts Name Discharge Info Relation Home Work Mobile [address]  Parent [1] [phone] Valentine Centeno [1] Spouse [3] [phone] About your hospitalization You were admitted on:  August 17, 2017 You last received care in the:  [address] [facility] You were discharged on:  August 19, 2017 Unit phone number:  [phone] Why you were hospitalized Your primary diagnosis was: Anemia Providers Seen During Your Hospitalizations Provider Role Specialty Primary office phone Magali Paniagua MD Attending Provider Emergency Medicine [phone] Turner Hood MD Attending Provider Hospitalist [phone] Leanne Muñoz MD Attending Provider Internal Medicine [phone] Your Primary Care Physician (PCP) Primary Care Physician Office Phone Office Fax Muriel Saldana [phone] Follow-up Information Follow up With Details Comments Contact Info Geneva Kauffman MD In 1 week  [address] [place] [address] [address] [place] [address] 
[phone] 
  
   pl follow up with your OB/GYN doctor after d/c in 1 week Your Appointments Tuesday August 29, 2017  8:30 AM EDT New Patient with Jensen Gonzalez MD  
 [facility] ([address]) [address], [address] [place] [address]  
[phone] Current Discharge Medication List  
  
START taking these medications Dose & Instructions Dispensing Information Comments Morning Noon Evening Bedtime  
 cholecalciferol 1,000 unit tablet Commonly known as:  VITAMIN D3 Your last dose was: Your next dose is:    
   
   
 Dose:  2000 Units Take 2 Tabs by mouth daily for 30 days. Quantity:  60 Tab Refills:  0  
     
   
   
   
  
 cyanocobalamin 1,000 mcg tablet Your last dose was: Your next dose is:    
   
   
 Dose:  1000 mcg Take 1 Tab by mouth daily for 30 days. Quantity:  30 Tab Refills:  0  
     
   
   
   
  
 ferrous sulfate 325 mg (65 mg iron) tablet Your last dose was: Your next dose is:    
   
   
 Dose:  325 mg Take 1 Tab by mouth two (2) times daily (with meals) for 30 days. Quantity:  60 Tab Refills:  0  
     
   
   
   
  
 multivitamin, tx-iron-ca-min 9 mg iron-400 mcg Tab tablet Commonly known as:  THERA-M w/ IRON Your last dose was: Your next dose is:    
   
   
 Dose:  1 Tab Take 1 Tab by mouth daily for 30 days. Quantity:  30 Tab Refills:  0 CONTINUE these medications which have NOT CHANGED Dose & Instructions Dispensing Information Comments Morning Noon Evening Bedtime BENADRYL 25 mg capsule Generic drug:  diphenhydrAMINE Your last dose was: Your next dose is:    
   
   
 Dose:  75 mg Take 75 mg by mouth nightly as needed for Sleep. Refills:  0 Where to Get Your Medications These medications were sent to [facility] [facility] 77, 072 [facility] [address] Phone:  [phone]  
  cyanocobalamin 1,000 mcg tablet Information on where to get these meds will be given to you by the nurse or doctor. ! Ask your nurse or doctor about these medications  
  cholecalciferol 1,000 unit tablet  
 ferrous sulfate 325 mg (65 mg iron) tablet  
 multivitamin, tx-iron-ca-min 9 mg iron-400 mcg Tab tablet Discharge Instructions Discharge Instructions PATIENT ID: Kristy Lindsey MRN: [id number] YOB: 1985 DATE OF ADMISSION: 8/17/2017  7:04 PM   
DATE OF DISCHARGE: 8/19/2017 PRIMARY CARE PROVIDER: Henna Bejarano MD  
 
ATTENDING PHYSICIAN: Lalo Shine MD 
DISCHARGING PROVIDER: aLlo Shine MD   
To contact this individual call [phone] and ask the  to page. If unavailable ask to be transferred the Adult Hospitalist Department. DISCHARGE DIAGNOSES Anemia due to absorption issue from gastric bypass surgery CONSULTATIONS: IP CONSULT TO HOSPITALIST 
IP CONSULT TO HEMATOLOGY 
IP CONSULT TO OB GYN 
 
PROCEDURES/SURGERIES: * No surgery found * PENDING TEST RESULTS:  
At the time of discharge the following test results are still pending: FOLLOW UP APPOINTMENTS:  
Follow-up Information Follow up With Details Comments Contact Info Henna Bejarano MD In 1 week  [address] 2500 350 [facility] 
[phone] ADDITIONAL CARE RECOMMENDATIONS:  
 
DIET: Regular Diet ACTIVITY: Activity as tolerated WOUND CARE:  
 
EQUIPMENT needed:  
 
 
DISCHARGE MEDICATIONS: 
 See Medication Reconciliation Form · It is important that you take the medication exactly as they are prescribed. · Keep your medication in the bottles provided by the pharmacist and keep a list of the medication names, dosages, and times to be taken in your wallet. · Do not take other medications without consulting your doctor. NOTIFY YOUR PHYSICIAN FOR ANY OF THE FOLLOWING:  
Fever over 101 degrees for 24 hours. Chest pain, shortness of breath, fever, chills, nausea, vomiting, diarrhea, change in mentation, falling, weakness, bleeding. Severe pain or pain not relieved by medications. Or, any other signs or symptoms that you may have questions about. DISPOSITION: 
x  Home With: 
 OT  PT  [facility]  RN  
  
 SNF/Inpatient Rehab/LTAC Independent/assisted living Hospice Other: CDMP Checked:  
Yes x PROBLEM LIST Updated: 
Yes x Signed:  
Mauricio Tate MD 
8/19/2017 
8:49 AM 
 
Discharge Orders None [employer] Announcement We are excited to announce that we are making your provider's discharge notes available to you in [employer]. You will see these notes when they are completed and signed by the physician that discharged you from your recent hospital stay. If you have any questions or concerns about any information you see in [employer], please call the Health Information Department where you were seen or reach out to your Primary Care Provider for more information about your plan of care. Introducing [facility]! Dear Melanie Vargas: Thank you for requesting a [employer] account. Our records indicate that you already have an active [employer] account. You can access your account anytime at [email or web address] Did you know that you can access your hospital and ER discharge instructions at any time in [employer]? You can also review all of your test results from your hospital stay or ER visit. Additional Information If you have questions, please visit the Frequently Asked Questions section of the [employer] website at [email or web address] Remember, [employer] is NOT to be used for urgent needs. For medical emergencies, dial 911. Now available from your iPhone and Android! General Information Please provide this summary of care documentation to your next provider.  
  
  
    
    
 Patient Signature: ____________________________________________________________ Date:  ____________________________________________________________  
  
Sigmund [place] Provider Signature:  ____________________________________________________________ Date:  ____________________________________________________________

## 2017-08-17 NOTE — TELEPHONE ENCOUNTER
Informed patient very anemic, need blood transfusion. Advised go to ER for evaluation and treatment. Follow up within a week, will discuss labs. Patient verbalized understanding.

## 2017-08-17 NOTE — PROGRESS NOTES
Left a detailed message regarding results;  to go to her nearest [facility] for blood transfusion as directed by Dr. Bridges [facility]; advised pt to return our call and confirm she received our message.

## 2017-08-17 NOTE — TELEPHONE ENCOUNTER
----- Message from Layla Brush MD sent at 8/17/2017 11:10 AM EDT -----  Hi Ms. Cari Schaefer,   Your labs show are very anemic. You will need a blood transfusion. The nurse is calling to direct you to the nearest [facility] ER for evaluation and treatment. Please schedule to see me in the clinic in 1 week for follow-up. We will address the remainder of your clinical abnormalities at time. Do not hesitate to contact the office if you have any questions or concerns before your next appointment.    Kind regards,   Dr. Ezio Winchester

## 2017-08-17 NOTE — PROGRESS NOTES
Hi Ms. Fong Colder,   Your labs show are very anemic. You will need a blood transfusion. The nurse is calling to direct you to the nearest [facility] ER for evaluation and treatment. Please schedule to see me in the clinic in 1 week for follow-up. We will address the remainder of your clinical abnormalities at time. Do not hesitate to contact the office if you have any questions or concerns before your next appointment.    Kind regards,   Dr. Thomas De La Fuente

## 2017-08-18 LAB
BASOPHILS # BLD: 0.1 K/UL (ref 0–0.1)
BASOPHILS NFR BLD: 2 % (ref 0–1)
DIFFERENTIAL METHOD BLD: ABNORMAL
EOSINOPHIL # BLD: 0 K/UL (ref 0–0.4)
EOSINOPHIL NFR BLD: 1 % (ref 0–7)
ERYTHROCYTE [DISTWIDTH] IN BLOOD BY AUTOMATED COUNT: 19.8 % (ref 11.5–14.5)
FERRITIN SERPL-MCNC: 2 NG/ML (ref 8–252)
FOLATE SERPL-MCNC: 8 NG/ML (ref 5–21)
HAPTOGLOB SERPL-MCNC: 65 MG/DL (ref 30–200)
HCT VFR BLD AUTO: 22.5 % (ref 35–47)
HGB BLD-MCNC: 6.8 G/DL (ref 11.5–16)
HGB BLD-MCNC: 8.2 G/DL (ref 11.5–16)
LDH SERPL L TO P-CCNC: 145 U/L (ref 81–246)
LYMPHOCYTES # BLD: 0.9 K/UL (ref 0.8–3.5)
LYMPHOCYTES NFR BLD: 21 % (ref 12–49)
MCH RBC QN AUTO: 20.4 PG (ref 26–34)
MCHC RBC AUTO-ENTMCNC: 30.2 G/DL (ref 30–36.5)
MCV RBC AUTO: 67.6 FL (ref 80–99)
MONOCYTES # BLD: 0.5 K/UL (ref 0–1)
MONOCYTES NFR BLD: 11 % (ref 5–13)
NEUTS SEG # BLD: 2.9 K/UL (ref 1.8–8)
NEUTS SEG NFR BLD: 65 % (ref 32–75)
PERIPHERAL SMEAR,PSM: NORMAL
PERIPHERAL SMEAR,PSM: NORMAL
PLATELET # BLD AUTO: 281 K/UL (ref 150–400)
PLATELET COMMENTS,PCOM: ABNORMAL
RBC # BLD AUTO: 3.33 M/UL (ref 3.8–5.2)
RBC MORPH BLD: ABNORMAL
RETICS/RBC NFR AUTO: 0.7 % (ref 0.7–2.1)
TSH SERPL DL<=0.05 MIU/L-ACNC: 1.95 UIU/ML (ref 0.36–3.74)
VIT B12 SERPL-MCNC: 271 PG/ML (ref 211–911)
WBC # BLD AUTO: 4.4 K/UL (ref 3.6–11)
WBC MORPH BLD: ABNORMAL

## 2017-08-18 PROCEDURE — 74011250637 HC RX REV CODE- 250/637: Performed by: INTERNAL MEDICINE

## 2017-08-18 PROCEDURE — 83883 ASSAY NEPHELOMETRY NOT SPEC: CPT | Performed by: INTERNAL MEDICINE

## 2017-08-18 PROCEDURE — 77030029131 HC ADMN ST IV BLD N DEHP ICUM -B

## 2017-08-18 PROCEDURE — 74011250636 HC RX REV CODE- 250/636: Performed by: FAMILY MEDICINE

## 2017-08-18 PROCEDURE — 85045 AUTOMATED RETICULOCYTE COUNT: CPT | Performed by: FAMILY MEDICINE

## 2017-08-18 PROCEDURE — 96365 THER/PROPH/DIAG IV INF INIT: CPT

## 2017-08-18 PROCEDURE — 86038 ANTINUCLEAR ANTIBODIES: CPT | Performed by: FAMILY MEDICINE

## 2017-08-18 PROCEDURE — 36415 COLL VENOUS BLD VENIPUNCTURE: CPT | Performed by: FAMILY MEDICINE

## 2017-08-18 PROCEDURE — 82728 ASSAY OF FERRITIN: CPT | Performed by: FAMILY MEDICINE

## 2017-08-18 PROCEDURE — 82746 ASSAY OF FOLIC ACID SERUM: CPT | Performed by: FAMILY MEDICINE

## 2017-08-18 PROCEDURE — 85025 COMPLETE CBC W/AUTO DIFF WBC: CPT | Performed by: FAMILY MEDICINE

## 2017-08-18 PROCEDURE — 84443 ASSAY THYROID STIM HORMONE: CPT | Performed by: FAMILY MEDICINE

## 2017-08-18 PROCEDURE — 82607 VITAMIN B-12: CPT | Performed by: FAMILY MEDICINE

## 2017-08-18 PROCEDURE — P9016 RBC LEUKOCYTES REDUCED: HCPCS | Performed by: PHYSICIAN ASSISTANT

## 2017-08-18 PROCEDURE — 85018 HEMOGLOBIN: CPT | Performed by: HOSPITALIST

## 2017-08-18 PROCEDURE — 82784 ASSAY IGA/IGD/IGG/IGM EACH: CPT | Performed by: INTERNAL MEDICINE

## 2017-08-18 PROCEDURE — 99218 HC RM OBSERVATION: CPT

## 2017-08-18 PROCEDURE — 74011250637 HC RX REV CODE- 250/637: Performed by: FAMILY MEDICINE

## 2017-08-18 PROCEDURE — 83615 LACTATE (LD) (LDH) ENZYME: CPT | Performed by: FAMILY MEDICINE

## 2017-08-18 PROCEDURE — 36430 TRANSFUSION BLD/BLD COMPNT: CPT

## 2017-08-18 PROCEDURE — 77030032490 HC SLV COMPR SCD KNE COVD -B

## 2017-08-18 PROCEDURE — 84155 ASSAY OF PROTEIN SERUM: CPT | Performed by: INTERNAL MEDICINE

## 2017-08-18 PROCEDURE — 83010 ASSAY OF HAPTOGLOBIN QUANT: CPT | Performed by: FAMILY MEDICINE

## 2017-08-18 PROCEDURE — 74011250636 HC RX REV CODE- 250/636: Performed by: INTERNAL MEDICINE

## 2017-08-18 RX ORDER — ACETAMINOPHEN 325 MG/1
650 TABLET ORAL ONCE
Status: COMPLETED | OUTPATIENT
Start: 2017-08-18 | End: 2017-08-18

## 2017-08-18 RX ORDER — ACETAMINOPHEN 325 MG/1
650 TABLET ORAL
Status: DISCONTINUED | OUTPATIENT
Start: 2017-08-18 | End: 2017-08-19 | Stop reason: HOSPADM

## 2017-08-18 RX ORDER — LANOLIN ALCOHOL/MO/W.PET/CERES
1 CREAM (GRAM) TOPICAL
Status: DISCONTINUED | OUTPATIENT
Start: 2017-08-19 | End: 2017-08-19 | Stop reason: HOSPADM

## 2017-08-18 RX ORDER — LANOLIN ALCOHOL/MO/W.PET/CERES
1000 CREAM (GRAM) TOPICAL DAILY
Status: DISCONTINUED | OUTPATIENT
Start: 2017-08-18 | End: 2017-08-19 | Stop reason: HOSPADM

## 2017-08-18 RX ORDER — SODIUM CHLORIDE 9 MG/ML
75 INJECTION, SOLUTION INTRAVENOUS CONTINUOUS
Status: DISCONTINUED | OUTPATIENT
Start: 2017-08-18 | End: 2017-08-19 | Stop reason: HOSPADM

## 2017-08-18 RX ADMIN — ACETAMINOPHEN 650 MG: 325 TABLET, FILM COATED ORAL at 11:00

## 2017-08-18 RX ADMIN — FERROUS SULFATE TAB 325 MG (65 MG ELEMENTAL FE) 325 MG: 325 (65 FE) TAB at 09:39

## 2017-08-18 RX ADMIN — VITAMIN D, TAB 1000IU (100/BT) 2000 UNITS: 25 TAB at 09:33

## 2017-08-18 RX ADMIN — IRON SUCROSE 300 MG: 20 INJECTION, SOLUTION INTRAVENOUS at 11:56

## 2017-08-18 RX ADMIN — SODIUM CHLORIDE 75 ML/HR: 900 INJECTION, SOLUTION INTRAVENOUS at 09:39

## 2017-08-18 RX ADMIN — Medication 1000 MCG: at 11:56

## 2017-08-18 RX ADMIN — FERROUS SULFATE TAB 325 MG (65 MG ELEMENTAL FE) 325 MG: 325 (65 FE) TAB at 18:25

## 2017-08-18 RX ADMIN — MULTIPLE VITAMINS W/ MINERALS TAB 1 TABLET: TAB at 09:33

## 2017-08-18 NOTE — CONSULTS
-Hematology / Oncology ([facility]) -    -CC- anemia    26 yo woman with hx gastric bypass and regular, somewhat heavy periods admitted with anemia. Reports prior tx with oral iron during pregnancies well tolerated. No other prior tx, no prior transfusions. No black or bloody stools, no N/V, no unexplained wt loss, no change in appetite. Vague abdominal discomfort. Has had fatigue, SOB, and paleness for several months. No F/C, NS. Has some breast masses to be evaluated. Does not take any supplements. PMHX: gastric bypass (after problems with sleeve 2013), HTN    HOME MEDICATIONS: Benadryl p.r.n. Insomnia.     SOCIAL HISTORY: Denies tobacco abuse. Occasional alcohol. Denies IV drug abuse. Lives at home.     REVIEW OF SYSTEMS: complete ros neg x as above      ALLERGIES: NO KNOWN DRUG ALLERGIES.    FAMILY HISTORY: Mother had history of hypertension, diabetes. Father had history of hypertension, diabetes and lung cancer.     [facility]    Patient Vitals for the past 24 hrs:   Temp Pulse Resp BP SpO2   08/18/17 0900 98.7 °F (37.1 °C) 78 16 109/61 99 %   08/18/17 0845 - 73 - 117/72 -   08/18/17 0830 - 76 - 113/75 -   08/18/17 0815 - 86 - 116/75 -   08/18/17 0800 - 95 - 116/75 -   08/18/17 0745 - 85 - 121/85 -   08/18/17 0700 98.2 °F (36.8 °C) 71 16 111/71 100 %   08/18/17 0645 98.5 °F (36.9 °C) 74 14 117/64 100 %   08/18/17 0620 98.4 °F (36.9 °C) 81 15 121/68 100 %   08/18/17 0600 98.9 °F (37.2 °C) 74 16 111/70 100 %   08/18/17 0500 98.4 °F (36.9 °C) 80 16 112/62 100 %   08/18/17 0342 98.4 °F (36.9 °C) 77 15 116/75 100 %   08/18/17 0300 98.4 °F (36.9 °C) 77 16 129/75 100 %   08/18/17 0225 98.4 °F (36.9 °C) 71 15 134/81 100 %   08/18/17 0156 98.4 °F (36.9 °C) 74 16 134/81 100 %   08/18/17 0047 98.1 °F (36.7 °C) 80 15 126/81 100 %   08/18/17 0000 - 86 18 115/66 100 %   08/17/17 2330 - 91 24 127/70 100 %   08/17/17 2300 - 85 21 113/89 100 %   08/17/17 2230 - 84 19 134/71 100 %   08/17/17 2158 99 °F (37.2 °C) 87 23 139/76 100 %   08/17/17 2015 99 °F (37.2 °C) 69 16 137/83 99 %   08/17/17 1710 98.3 °F (36.8 °C) 95 16 135/87 97 %        Gen: nad  H+N: oral cavity moist  Lymph: no palpable MIRNA neck, ax  Chest: CTAB  CV: RRR  Abd: s/nt  Extr: no edema  Skin: no excessive bruising  Neuro: moving all extremities    -Labs-    Recent Labs      08/18/17   0625  08/17/17   1731   WBC  4.4  5.0   HGB  6.8*  6.5*   PLT  281  412*   ANEU  PENDING  2.7   NA   --   139   K   --   3.5   GLU   --   125*   BUN   --   8   CREA   --   0.70   ALT   --   18   SGOT   --   7*   TBILI   --   0.2   AP   --   64   CA   --   8.3*     8/18: retic 0.7%. Haptoglobin pending  8/17: U preg: negative. UA: 5-10 rbc, pos nit, 2+bact  --- iron 19, tibc 476, sat 4%. 8/14- ferritin 4. TSH 1.77. B12 283. Folate 5.1.    -Imaging-   CT abd/pel w contrast 8/17-  Incidental bilateral there is a hiatal hernia. The gallbladder is not distended. The pancreas appears unremarkable. The kidneys show no definite obstruction. Breast masses. Incidental 3 mm left lung base nodule. Prior left upper quadrant  surgery. Liver and spleen are normal in size. There is a hiatal hernia. Gallbladder is not distended. Kidneys appeared unobstructed. The pancreas  appears unremarkable. There is no bowel wall thickening or obstruction. There is  no free air or free fluid. Uterus appear prominent in size. The ovaries appear  unremarkable by this technique. The bladder is midline but unfilled. IMPRESSION   impression: Prominent sized uterus. Prior surgery. Breast masses, left lung  base nodule.    -Assessment + Plan-     *) anemia: microcytic with high rdw, setting of prior gastric bypass and menstruation  --- microcytosis not present 2013  --- iron studies show deficiency. Accompanying thrombocytosis likely related. --- hemoccult pending  --- b12 low-normal.  Setting of gastric bypass. Start supplement. --- elevated globulin fraction.  Monoclonal process unlikely but will r/o  --- s/p 2 units prbc 8/17  --- will give a dose of IV iron and start oral iron, recommended she continue after discharge  --- f/u 1 mo for review of pending labs and recheck cbc, contact info provided    *) breast masses- GYN eval pending

## 2017-08-18 NOTE — ED NOTES
Purposeful hourly rounding completed. Patient has no complaints at this time. Rates pain a 0/10. Does not have to use the restroom at this time. Patient in a position of comfort. Belongings secure in their room, side rails up x 2, bedside table within reach, and call bell within reach. Will continue to monitor and re-assess as appropriate.

## 2017-08-18 NOTE — PROGRESS NOTES
0110 Pt received via stretcher. Alert and oriented x4 denies pain. IV intact and patent. 0156 blood tranfusion #1 started    0259 Pt requests benadryl stating she takes the medication at home due to difficulty sleeping.     1066 blood transfusion #1 complete    0545 Blood drawn    0616 Blood transfusion #2 started    0800 Pt care endorsed to dayshift, Rn

## 2017-08-18 NOTE — PROGRESS NOTES
GYN consult (Dr. Gareth Whitley) spoke to  Lucinda Eugene) about consult for patient; GYN hospitalist will not be coming to see patient about left breast mass; suggested patient return to own GYN for breast issue (patient already had previous set appointment); paged Dr. Myers Erps to inform him of what the GYN consult was doing; he verbalized understanding; nor further orders given; informed the patient; she is in agreement with GYN's plan to return to own GYN MD

## 2017-08-18 NOTE — ROUTINE PROCESS
TRANSFER - OUT REPORT:    Verbal report given to Santos Jung RN (name) on [employer]  being transferred to 555(unit) for routine progression of care       Report consisted of patients Situation, Background, Assessment and   Recommendations(SBAR). Information from the following report(s) SBAR, ED Summary, [place] and Recent Results was reviewed with the receiving nurse. Lines:   Peripheral IV 08/17/17 Right Antecubital (Active)   Site Assessment Clean, dry, & intact 8/17/2017  5:34 PM   Phlebitis Assessment 0 8/17/2017  5:34 PM   Infiltration Assessment 0 8/17/2017  5:34 PM   Dressing Status Clean, dry, & intact 8/17/2017  5:34 PM   Dressing Type Transparent 8/17/2017  5:34 PM   Hub Color/Line Status Pink;Capped;Flushed;Patent 8/17/2017  5:34 PM   Action Taken Blood drawn 8/17/2017  5:34 PM        Opportunity for questions and clarification was provided.       Patient transported with:   Incoming Media

## 2017-08-18 NOTE — H&P
[address]   [address] [facility], [address] [place] [address]   HISTORY AND PHYSICAL       Name:  Erin Gitelman   MR#:  [id number]   :  1985   Account #:  [de-identified]        Date of Adm:  2017       CHIEF COMPLAINT: Abnormal labs. HISTORY OF PRESENT ILLNESS: The patient is a 78-year-old   female with past medical history of gastric bypass, where a sleeve   procedure was then converted to a bypass surgery, history of transient   hypertension, who presents to the hospital with the above-mentioned   symptoms. The patient reports that she had an appointment with her   primary care physician for some skin discoloration associated with dry   skin and feeling cracked lip. The patient reports that multiple labs were   done, and today she was called and told to come to the hospital   immediately because \"blood counts were very low. \" The patient reports   that she has been having some exertional dyspnea associated with her   symptoms, but denies any chest pain associated with that. The patient   also reports that she had noticed a lump in her right breast and was to   be seen by her gynecologist today for evaluation. The patient denies   any hematemesis, melena, hemoptysis. Denies any hematuria or any   dysmenorrhea, dysfunctional uterine bleeding or metromenorrhagia. The patient also denies any headache, blurry vision, sore throat,   trouble swallowing, trouble with speech, any chest pain, abdominal   pain, constipation, diarrhea, urinary symptoms, focal or generalized   neurological weakness, recent travel, sick contacts, falls, injuries or   any other concerns or problems. The patient was found to have a   hemoglobin of 6.5 in the ER and was requested to be admitted under   the hospitalist service. PAST MEDICAL HISTORY: See above. HOME MEDICATIONS: Normally the patient is on Benadryl p.r.n. SOCIAL HISTORY: Denies tobacco abuse. Occasional alcohol. Denies   IV drug abuse. Lives at home. REVIEW OF SYSTEMS: All systems were reviewed and were found to   be essentially negative, except for the symptoms mentioned above. ALLERGIES: NO KNOWN DRUG ALLERGIES. FAMILY HISTORY: Mother had history of hypertension, diabetes. Father had history of hypertension, diabetes and lung cancer. PHYSICAL EXAMINATION:   VITALS: Temperature is 99, pulse 69, respiratory rate blood   pressure 131/83, pulse oximetry 99% on room air. GENERAL: Alert x3, awake, no acute distress, resting in bed, pleasant   female, appears to be stated age. HEENT: Pupils equal and reactive to light. Dry mucous membranes. Tympanic membranes clear. NECK: Supple. CHEST: Clear to auscultation bilaterally. CORONARY: S1, S2 were heard. ABDOMEN: Soft, tender to palpation in the epigastric region. No   rebound, no guarding. Bowel sounds were physiological.   EXTREMITIES: No clubbing, no cyanosis, no edema. NEUROPSYCH: Pleasant mood and affect. Sensory grossly within   normal limits. DTR 2+. Strength 5/5, moves all 4. SKIN: Warm. BREASTS: A breast exam was done with a chaperone, shows mobile   mass situated at 7 o'clock position in the right breast. No nipple   discharge. No discoloration of the skin was noted. LABS: White count 5, hemoglobin 6.5, hematocrit 22.9, platelets 613. Urine shows positive nitrite, negative leukocyte esterase, with 0-4   WBCs and 2+ bacteria. Sodium 139, potassium chloride 106,   bicarbonate 27, anion gap 7, BUN 8, creatinine 0.7, calcium 8.3,   bilirubin total 0.2, albumin 3.8, ALT 18, AST 7, alkaline phosphatase   64. The patient had the following labs done on an outpatient basis: Vitamin   B1 of 98.4. Thyroid T4 was 6.2, T3 was 24, free thyroxine was 1.5,   TSH was 1.77. Vitamin C was 1.8, zinc was 83, ferritin was 4, vitamin   B12 was 283, vitamin D was 5.1. ASSESSMENT AND PLAN:   1. Anemia, most likely secondary to malabsorption due to gastric   bypass surgery. The patient has not been on any supplement patient   transfused 2 units PRBC, that has been ordered in the ER. Will provide   multivitamin, will get iron profile, haptoglobin, LDH, and other labs. Will   get a hematology consult. Monitor hemoglobin and hematocrit and   further intervention will be per hospital course. If symptoms do not   improve may consider further intervention, including bone marrow   biopsy. Will reassess as needed. Continue to monitor. Will also start   the patient on iron and will continue to closely monitor. 2. Breast mass. Will get a GYN consult. Continue to closely monitor. 3. Vitamin D deficiency. Will start the patient on cholecalciferol. 4. GI/DVT prophylaxis. The patient will be on SCDs. 5. Asymptomatic bacteriuria. Will send urine for culture.         MD Samanta Dinero / Arleth Palomares   D:  08/17/2017   20:39   T:  08/18/2017   00:02   Job #:  [id number]

## 2017-08-18 NOTE — PROGRESS NOTES
Hospitalist Progress Note  Aung Olivo MD  Office: [phone]        Date of Service:  2017  NAME:  Johanny Pinto  :  1985  MRN:  [id number]      Admission Summary:   The patient is a 53-year-old   female with past medical history of gastric bypass, where a sleeve   procedure was then converted to a bypass surgery, history of transient   hypertension, who presents to the hospital with the above-mentioned   symptoms. The patient reports that she had an appointment with her   primary care physician for some skin discoloration associated with dry   skin and feeling cracked lip    Interval history / Subjective:   S/P BT, she wants IV iron and OB/ GYN to see her for breast lump     Assessment & Plan:     1. Anemia, most likely secondary to malabsorption due to gastric   bypass surgery. S/P BT seen by Hematology will follow up on recommendations repeat labs in AM  2. Breast mass. Will get a GYN consult. Continue to closely monitor. 3. Vitamin D deficiency. Will start the patient on cholecalciferol. 4. GI/DVT prophylaxis. The patient will be on SCDs. 5. Asymptomatic bacteriuria. Will send urine for culture.     Code status: Full  DVT prophylaxis: SCD    Care Plan discussed with: Patient/Family and Nurse  Disposition: Home w/Family and TBD     Hospital Problems  Date Reviewed: 2017          Codes Class Noted POA    * (Principal)Anemia ICD-10-CM: D64.9  ICD-9-CM: 285.9  2017 Unknown                Review of Systems:   A comprehensive review of systems was negative. Vital Signs:    Last 24hrs VS reviewed since prior progress note.  Most recent are:  Visit Vitals    /61 (BP 1 Location: Left arm, BP Patient Position: At rest)    Pulse 78    Temp 98.7 °F (37.1 °C)    Resp 16    Ht 5' 2\" (1.575 m)    Wt 79.5 kg (175 lb 3.2 oz)    SpO2 99%    Breastfeeding No    BMI 32.04 kg/m2         Intake/Output Summary (Last 24 hours) at 08/18/17 1053  Last data filed at 08/18/17 0600   Gross per 24 hour   Intake            406.6 ml   Output                0 ml   Net            406.6 ml        Physical Examination:             Constitutional:  No acute distress, cooperative, pleasant    ENT:  Oral mucous moist, oropharynx benign. Neck supple,    Resp:  CTA bilaterally. No wheezing/rhonchi/rales. No accessory muscle use   CV:  Regular rhythm, normal rate, no murmurs, gallops, rubs    GI:  Soft, non distended, non tender. normoactive bowel sounds, no hepatosplenomegaly     Musculoskeletal:  No edema, warm, 2+ pulses throughout    Neurologic:  Moves all extremities. AAOx3, CN II-XII reviewed     Psych:  Good insight, Not anxious nor agitated. Data Review:    I personally reviewed  Image and labs      Labs:     Recent Labs      08/18/17   0625  08/17/17   1731   WBC  4.4  5.0   HGB  6.8*  6.5*   HCT  22.5*  22.9*   PLT  281  412*     Recent Labs      08/17/17   1731   NA  139   K  3.5   CL  106   CO2  26   BUN  8   CREA  0.70   GLU  125*   CA  8.3*     Recent Labs      08/17/17   1731   SGOT  7*   ALT  18   AP  64   TBILI  0.2   TP  8.1   ALB  3.8   GLOB  4.3*   LPSE  139     No results for input(s): INR, PTP, APTT in the last 72 hours. No lab exists for component: INREXT   Recent Labs      08/18/17   0625  08/17/17   1731   TIBC   --   476*   PSAT   --   4*   FERR  2*   --       Lab Results   Component Value Date/Time    Folate 8.0 08/18/2017 06:25 AM      No results for input(s): PH, PCO2, PO2 in the last 72 hours. No results for input(s): CPK, CKNDX, TROIQ in the last 72 hours.     No lab exists for component: CPKMB  Lab Results   Component Value Date/Time    Cholesterol, total 168 06/27/2013 08:58 AM    HDL Cholesterol 60 09/18/2012 08:56 AM    LDL, calculated 84 09/18/2012 08:56 AM    Triglyceride 53 09/18/2012 08:56 AM    CHOL/HDL Ratio 3.0 10/28/2009 04:12 PM     Lab Results   Component Value Date/Time    Glucose (POC) 131 08/03/2013 05:01 PM    Glucose (POC) 141 08/03/2013 12:14 PM    Glucose (POC) 136 08/03/2013 06:11 AM    Glucose (POC) 209 08/02/2013 11:52 PM    Glucose (POC) 165 08/02/2013 06:30 PM     Lab Results   Component Value Date/Time    Color YELLOW/STRAW 08/17/2017 07:22 PM    Appearance CLOUDY 08/17/2017 07:22 PM    Specific gravity 1.011 08/17/2017 07:22 PM    pH (UA) 6.5 08/17/2017 07:22 PM    Protein NEGATIVE  08/17/2017 07:22 PM    Glucose NEGATIVE  08/17/2017 07:22 PM    Ketone NEGATIVE  08/17/2017 07:22 PM    Bilirubin NEGATIVE  08/17/2017 07:22 PM    Urobilinogen 1.0 08/17/2017 07:22 PM    Nitrites POSITIVE 08/17/2017 07:22 PM    Leukocyte Esterase NEGATIVE  08/17/2017 07:22 PM    Epithelial cells FEW 08/17/2017 07:22 PM    Bacteria 2+ 08/17/2017 07:22 PM    WBC 0-4 08/17/2017 07:22 PM    RBC 5-10 08/17/2017 07:22 PM         Medications Reviewed:     Current Facility-Administered Medications   Medication Dose Route Frequency    0.9% sodium chloride infusion  75 mL/hr IntraVENous CONTINUOUS    acetaminophen (TYLENOL) tablet 650 mg  650 mg Oral Q4H PRN    iron sucrose (VENOFER) 300 mg in 0.9% sodium chloride 250 mL IVPB  300 mg IntraVENous ONCE    acetaminophen (TYLENOL) tablet 650 mg  650 mg Oral ONCE    [START ON 8/19/2017] ferrous sulfate tablet 325 mg  1 Tab Oral DAILY WITH BREAKFAST    cyanocobalamin (VITAMIN B12) tablet 1,000 mcg  1,000 mcg Oral DAILY    0.9% sodium chloride infusion 250 mL  250 mL IntraVENous PRN    multivitamin, tx-iron-ca-min (THERA-M w/ IRON) tablet 1 Tab  1 Tab Oral DAILY    cholecalciferol (VITAMIN D3) tablet 2,000 Units  2,000 Units Oral DAILY    ferrous sulfate tablet 325 mg  1 Tab Oral BID WITH MEALS     ______________________________________________________________________  EXPECTED LENGTH OF STAY: - - -  ACTUAL LENGTH OF STAY:          0                 Kika Almeida MD

## 2017-08-18 NOTE — PROGRESS NOTES
Bedside shift change report given to Venu Ballesteros RN (oncoming nurse) by JULISSA Fofana RN (offgoing nurse). Report included the following information SBAR.

## 2017-08-18 NOTE — PROGRESS NOTES
TRANSFER - IN REPORT:    Verbal report received from ROSALINO Lopes(name) on [employer]  being received from ED(unit) for routine progression of care      Report consisted of patients Situation, Background, Assessment and   Recommendations(SBAR). Information from the following report(s) SBAR was reviewed with the receiving nurse. Opportunity for questions and clarification was provided. Assessment completed upon patients arrival to unit and care assumed.

## 2017-08-19 VITALS
SYSTOLIC BLOOD PRESSURE: 111 MMHG | BODY MASS INDEX: 32.24 KG/M2 | HEIGHT: 62 IN | DIASTOLIC BLOOD PRESSURE: 74 MMHG | WEIGHT: 175.2 LBS | TEMPERATURE: 98.3 F | HEART RATE: 78 BPM | OXYGEN SATURATION: 100 % | RESPIRATION RATE: 14 BRPM

## 2017-08-19 PROBLEM — D64.9 ANEMIA: Status: RESOLVED | Noted: 2017-08-17 | Resolved: 2017-08-19

## 2017-08-19 LAB
ABO + RH BLD: NORMAL
ANA SER QL: NEGATIVE
BLD PROD TYP BPU: NORMAL
BLD PROD TYP BPU: NORMAL
BLOOD GROUP ANTIBODIES SERPL: NORMAL
BPU ID: NORMAL
BPU ID: NORMAL
CROSSMATCH RESULT,%XM: NORMAL
CROSSMATCH RESULT,%XM: NORMAL
DAT POLY-SP REAG RBC QL: NORMAL
SPECIMEN EXP DATE BLD: NORMAL
STATUS OF UNIT,%ST: NORMAL
STATUS OF UNIT,%ST: NORMAL
UNIT DIVISION, %UDIV: 0
UNIT DIVISION, %UDIV: 0

## 2017-08-19 PROCEDURE — 74011250637 HC RX REV CODE- 250/637: Performed by: INTERNAL MEDICINE

## 2017-08-19 PROCEDURE — 99218 HC RM OBSERVATION: CPT

## 2017-08-19 PROCEDURE — 74011250637 HC RX REV CODE- 250/637: Performed by: FAMILY MEDICINE

## 2017-08-19 RX ORDER — MELATONIN
2000 DAILY
Qty: 60 TAB | Refills: 0 | Status: SHIPPED | OUTPATIENT
Start: 2017-08-19 | End: 2017-08-28

## 2017-08-19 RX ORDER — LANOLIN ALCOHOL/MO/W.PET/CERES
1000 CREAM (GRAM) TOPICAL DAILY
Qty: 30 TAB | Refills: 0 | Status: SHIPPED | OUTPATIENT
Start: 2017-08-19 | End: 2017-09-18

## 2017-08-19 RX ORDER — LANOLIN ALCOHOL/MO/W.PET/CERES
325 CREAM (GRAM) TOPICAL 2 TIMES DAILY WITH MEALS
Qty: 60 TAB | Refills: 0 | Status: SHIPPED | OUTPATIENT
Start: 2017-08-19 | End: 2017-09-18

## 2017-08-19 RX ADMIN — FERROUS SULFATE TAB 325 MG (65 MG ELEMENTAL FE) 325 MG: 325 (65 FE) TAB at 09:12

## 2017-08-19 RX ADMIN — VITAMIN D, TAB 1000IU (100/BT) 2000 UNITS: 25 TAB at 09:12

## 2017-08-19 RX ADMIN — Medication 1000 MCG: at 09:13

## 2017-08-19 RX ADMIN — MULTIPLE VITAMINS W/ MINERALS TAB 1 TABLET: TAB at 09:12

## 2017-08-19 NOTE — PROGRESS NOTES
Bedside and Verbal shift change report given to Otoniel Torres (oncoming nurse) by [facility] and [address] Karen Prakash (offgoing nurse). Report included the following information SBAR, Kardex, Intake/Output, MAR and Recent Results.

## 2017-08-19 NOTE — PROGRESS NOTES
Problem: Falls - Risk of  Goal: *Absence of Falls  Document Ese Fall Risk and appropriate interventions in the flowsheet.    Outcome: Progressing Towards Goal  Fall Risk Interventions:              Medication Interventions: Teach patient to arise slowly, Patient to call before getting OOB

## 2017-08-19 NOTE — ROUTINE PROCESS
Bedside shift change report given to Matteo Vasquez RN (oncoming nurse) by Casey Cox RN (offgoing nurse). Report included the following information SBAR.

## 2017-08-19 NOTE — DISCHARGE SUMMARY
Discharge Summary       PATIENT ID: Paul Adame  MRN: [id number]   YOB: 1985    DATE OF ADMISSION: 8/17/2017  7:04 PM    DATE OF DISCHARGE: 8 /19/17  PRIMARY CARE PROVIDER: Layla Brush MD     ATTENDING PHYSICIAN: Jessica Rangel  DISCHARGING PROVIDER: Brain Lombardo MD    To contact this individual call [phone] and ask the  to page. If unavailable ask to be transferred the Adult Hospitalist Department. CONSULTATIONS: IP CONSULT TO HOSPITALIST  IP CONSULT TO HEMATOLOGY  IP CONSULT TO OB GYN    PROCEDURES/SURGERIES: * No surgery found *    ADMITTING [address] [place] [address] COURSE:   The patient is a 26-year-old   female with past medical history of gastric bypass, where a sleeve   procedure was then converted to a bypass surgery, history of transient   hypertension, who presents to the hospital with the above-mentioned   symptoms. The patient reports that she had an appointment with her   primary care physician for some skin discoloration associated with dry   skin and feeling cracked lip         Assessment & Plan:      1. Anemia, most likely secondary to malabsorption due to gastric   bypass surgery. S/P BT seen by Hematology  Good response on oral supplementation on d/c  2. Breast mass. Follow up with you ob/gyn after d/c  3. Vitamin D deficiency. Will start the patient on cholecalciferol. Follow up with PCP  4. GI/DVT prophylaxis. The patient will be on SCDs. 5. Asymptomatic bacteriuria. Will send urine for culture.          PENDING TEST RESULTS:   At the time of discharge the following test results are still pending:     FOLLOW UP APPOINTMENTS:    Follow-up Information     Follow up With Details Comments Contact Jefferson Brush MD In 1 week  [address] [place] [address] [address] [facility] [address]  [phone]         follow up with your OB/GYN doctor after d/c in 1 week            ADDITIONAL CARE RECOMMENDATIONS:     DIET: Regular Diet      ACTIVITY: Activity as tolerated    WOUND CARE:     EQUIPMENT needed:       DISCHARGE MEDICATIONS:  Current Discharge Medication List      START taking these medications    Details   cyanocobalamin 1,000 mcg tablet Take 1 Tab by mouth daily for 30 days. Qty: 30 Tab, Refills: 0      ferrous sulfate 325 mg (65 mg iron) tablet Take 1 Tab by mouth two (2) times daily (with meals) for 30 days. Qty: 60 Tab, Refills: 0      multivitamin, tx-iron-ca-min (THERA-M W/ IRON) 9 mg iron-400 mcg tab tablet Take 1 Tab by mouth daily for 30 days. Qty: 30 Tab, Refills: 0         CONTINUE these medications which have NOT CHANGED    Details   diphenhydrAMINE (BENADRYL) 25 mg capsule Take 75 mg by mouth nightly as needed for Sleep. NOTIFY YOUR PHYSICIAN FOR ANY OF THE FOLLOWING:   Fever over 101 degrees for 24 hours. Chest pain, shortness of breath, fever, chills, nausea, vomiting, diarrhea, change in mentation, falling, weakness, bleeding. Severe pain or pain not relieved by medications. Or, any other signs or symptoms that you may have questions about. DISPOSITION:  x  Home With:   OT  PT  HH  RN       Long term SNF/Inpatient Rehab    Independent/assisted living    Hospice    Other:       PATIENT CONDITION AT DISCHARGE:     Functional status    Poor     Deconditioned    x Independent      Cognition   x  Lucid     Forgetful     Dementia      Catheters/lines (plus indication)    Ruiz     PICC     PEG    x None      Code status    x Full code     DNR      PHYSICAL EXAMINATION AT DISCHARGE:     Constitutional:  No acute distress, cooperative, pleasant    ENT:  Oral mucous moist, oropharynx benign. Neck supple,    Resp:  CTA bilaterally. No wheezing/rhonchi/rales. No accessory muscle use   CV:  Regular rhythm, normal rate, no murmurs, gallops, rubs    GI:  Soft, non distended, non tender.  normoactive bowel sounds, no hepatosplenomegaly     Musculoskeletal:  No edema, warm, 2+ pulses throughout    Neurologic:  Moves all extremities. AAOx3, CN II-XII reviewed                                             Psych:  Good insight, Not anxious nor agitated.         CHRONIC MEDICAL DIAGNOSES:  Problem List as of 8/19/2017  Date Reviewed: 8/19/2017          Codes Class Noted - Resolved    S/P gastric bypass ICD-10-CM: Z98.84  ICD-9-CM: V45.86  8/11/2017 - Present        Adjustment insomnia ICD-10-CM: F51.02  ICD-9-CM: 307.41  8/11/2017 - Present        * (Principal)RESOLVED: Anemia ICD-10-CM: D64.9  ICD-9-CM: 285.9  8/17/2017 - 8/19/2017              Greater than 30  minutes were spent with the patient on counseling and coordination of care    Signed:   Leatha Jiménez MD  8/19/2017  8:53 AM

## 2017-08-19 NOTE — PROGRESS NOTES
I have reviewed discharge instructions with the patient. The patient verbalized understanding. Prescriptions will be filled as an outpatient.

## 2017-08-19 NOTE — DISCHARGE INSTRUCTIONS
Discharge Instructions       PATIENT ID: Kaden Upton  MRN: [id number]   YOB: 1985    DATE OF ADMISSION: 8/17/2017  7:04 PM    DATE OF DISCHARGE: 8/19/2017    PRIMARY CARE PROVIDER: José Claudio MD     ATTENDING PHYSICIAN: Dali Yuan MD  DISCHARGING PROVIDER: Dali Yuan MD    To contact this individual call [phone] and ask the  to page. If unavailable ask to be transferred the Adult Hospitalist Department. DISCHARGE DIAGNOSES Anemia due to absorption issue from gastric bypass surgery    CONSULTATIONS: IP CONSULT TO HOSPITALIST  IP CONSULT TO HEMATOLOGY  IP CONSULT TO OB GYN    PROCEDURES/SURGERIES: * No surgery found *    PENDING TEST RESULTS:   At the time of discharge the following test results are still pending:     FOLLOW UP APPOINTMENTS:   Follow-up Information     Follow up With Details Comments Contact Info    José Claudio MD In 1 week  [address] [place] [address] [address] [facility] [address] [phone]             ADDITIONAL CARE RECOMMENDATIONS:     DIET: Regular Diet    ACTIVITY: Activity as tolerated    WOUND CARE:     EQUIPMENT needed:       DISCHARGE MEDICATIONS:   See Medication Reconciliation Form    · It is important that you take the medication exactly as they are prescribed. · Keep your medication in the bottles provided by the pharmacist and keep a list of the medication names, dosages, and times to be taken in your wallet. · Do not take other medications without consulting your doctor. NOTIFY YOUR PHYSICIAN FOR ANY OF THE FOLLOWING:   Fever over 101 degrees for 24 hours. Chest pain, shortness of breath, fever, chills, nausea, vomiting, diarrhea, change in mentation, falling, weakness, bleeding. Severe pain or pain not relieved by medications. Or, any other signs or symptoms that you may have questions about.       DISPOSITION:  x  Home With:   OT  PT  HH  RN       SNF/Inpatient Rehab/LTAC    Independent/assisted living Hospice    Other:     CDMP Checked:   Yes x     PROBLEM LIST Updated:  Yes x       Signed:   Amrik Aguayo MD  8/19/2017  8:49 AM

## 2017-08-21 ENCOUNTER — PATIENT OUTREACH (OUTPATIENT)
Dept: INTERNAL MEDICINE CLINIC | Age: 32
End: 2017-08-21

## 2017-08-21 LAB
IGA SERPL-MCNC: 168 MG/DL (ref 87–352)
IGG SERPL-MCNC: 1358 MG/DL (ref 700–1600)
IGM SERPL-MCNC: 193 MG/DL (ref 26–217)
KAPPA LC FREE SER-MCNC: 24.3 MG/L (ref 3.3–19.4)
KAPPA LC FREE/LAMBDA FREE SER: 1.5 {RATIO} (ref 0.26–1.65)
LAMBDA LC FREE SERPL-MCNC: 16.2 MG/L (ref 5.7–26.3)
PROT PATTERN SERPL IFE-IMP: NORMAL

## 2017-08-21 NOTE — PROGRESS NOTES
NNTOCIP    NN Transitions of Care Note:  Hospital Follow Up for [address] [place] [address] Admission from 8/17/17 - 8/19/17 for Anemia. RRAT score:  Low  Total Hospitalizations/ED visits last 12 months? 0    Advance Medical Directive on file in EMR? no     This represents Transitions of Care because NN spoke with patient and/or caregiver within 1 business days of discharge. Pt's TCM follow up appt is scheduled with Dr. Geneva Field as noted below and is within 10 days of discharge. Future Appointments  Date Time Provider Rosa Arauz   8/28/2017 1:30 PM Josh Thakkar MD [address] [facility]       Called patient on 8/21/17 and verified with 2 identifiers. Reports that she is feeling better and is currently at work. Discussed supplements and she is picking up scripts from her pharmacy today. Encouraged her to get on these asap. Discharge hgb was 8.2. Reviewed that her med list indicated that she takes 75mg benadryl prn for sleep- reviewed taking lower dosage and reviewed good sleep hygiene to include shutting off TV and phone at least 30-60 minutes before bed, taking short walk to help wind down at end of the day, white noise apps, and sleep cycle apps for feedback. Pt agrees to try some of these and will report back to how they are working. She is also considering taking melatonin and reviewed dosage to start at. Red flags reviewed. No other needs at this time. Patient presenting symptoms Referred to [address] [place] [address] by Dr. Geneva Field for hgb 6.2    Medical History:     Past Medical History:   Diagnosis Date    Nausea & vomiting     Transient elevated blood pressure         Diagnosed with Anemia. Admitted to Hospitalist Service with consults from Hematology. Dr. Vu Keith, [facility] Hematology Consult Note:  -Hematology / Oncology ([facility]) -     -CC- anemia     28 yo woman with hx gastric bypass and regular, somewhat heavy periods admitted with anemia. Reports prior tx with oral iron during pregnancies well tolerated.   No other prior tx, no prior transfusions. No black or bloody stools, no N/V, no unexplained wt loss, no change in appetite. Vague abdominal discomfort. Has had fatigue, SOB, and paleness for several months. No F/C, NS. Has some breast masses to be evaluated. Does not take any supplements.     PMHX: gastric bypass (after problems with sleeve 2013), HTN     HOME MEDICATIONS: Benadryl p.r.n. Insomnia.      SOCIAL HISTORY: Denies tobacco abuse. Occasional alcohol. Denies IV drug abuse. Lives at home.      REVIEW OF SYSTEMS: complete ros neg x as above       ALLERGIES: NO KNOWN DRUG ALLERGIES.     FAMILY HISTORY: Mother had history of hypertension, diabetes. Father had history of hypertension, diabetes and lung cancer.        -Labs-         Recent Labs       08/18/17   0625  08/17/17   1731   WBC  4.4  5.0   HGB  6.8*  6.5*   PLT  281  412*   ANEU  PENDING  2.7   NA   --   139   K   --   3.5   GLU   --   125*   BUN   --   8   CREA   --   0.70   ALT   --   18   SGOT   --   7*   TBILI   --   0.2   AP   --   64   CA   --   8.3*      8/18: retic 0.7%. Haptoglobin pending  8/17: U preg: negative. UA: 5-10 rbc, pos nit, 2+bact  --- iron 19, tibc 476, sat 4%. 8/14- ferritin 4. TSH 1.77. B12 283. Folate 5. 1.     -Imaging-   CT abd/pel w contrast 8/17-  Incidental bilateral there is a hiatal hernia. The gallbladder is not distended. The pancreas appears unremarkable. The kidneys show no definite obstruction. Breast masses. Incidental 3 mm left lung base nodule. Prior left upper quadrant  surgery. Liver and spleen are normal in size. There is a hiatal hernia. Gallbladder is not distended. Kidneys appeared unobstructed. The pancreas  appears unremarkable. There is no bowel wall thickening or obstruction. There is  no free air or free fluid. Uterus appear prominent in size. The ovaries appear  unremarkable by this technique. The bladder is midline but unfilled. IMPRESSION   impression: Prominent sized uterus. Prior surgery.  Breast masses, left lung  base nodule.     -Assessment + Plan-     *) anemia: microcytic with high rdw, setting of prior gastric bypass and menstruation  --- microcytosis not present 2013  --- iron studies show deficiency. Accompanying thrombocytosis likely related. --- hemoccult pending  --- b12 low-normal.  Setting of gastric bypass. Start supplement. --- elevated globulin fraction. Monoclonal process unlikely but will r/o  --- s/p 2 units prbc 8/17  --- will give a dose of IV iron and start oral iron, recommended she continue after discharge  --- f/u 1 mo for review of pending labs and recheck cbc, contact info provided     *) breast masses- GYN eval pending      Course of current Hospitalization (referenced by Dr. Mary Torres Hospitalist Discharge Summary):    Discharge Summary         PATIENT ID: Violet Sood  MRN: [id number]   YOB: 1985    DATE OF ADMISSION: 8/17/2017  7:04 PM    DATE OF DISCHARGE: 8 /19/17  PRIMARY CARE PROVIDER: Carole Koyanagi, MD      ATTENDING PHYSICIAN: Amilcar Pena  DISCHARGING PROVIDER: Carmelina Mota MD    To contact this individual call [phone] and ask the  to page. If unavailable ask to be transferred the Adult Hospitalist Department.     CONSULTATIONS: IP CONSULT TO HOSPITALIST  IP CONSULT TO HEMATOLOGY  IP CONSULT TO OB GYN     PROCEDURES/SURGERIES: * No surgery found *     [address] [facility] COURSE:   The patient is a 15-year-old   female with past medical history of gastric bypass, where a sleeve   procedure was then converted to a bypass surgery, history of transient   hypertension, who presents to the hospital with the above-mentioned   symptoms. The patient reports that she had an appointment with her   primary care physician for some skin discoloration associated with dry   skin and feeling cracked lip           Assessment & Plan:       1. Anemia, most likely secondary to malabsorption due to gastric   bypass surgery. S/P BT seen by Hematology  Good response on oral supplementation on d/c  2. Breast mass. Follow up with you ob/gyn after d/c  3. Vitamin D deficiency. Will start the patient on cholecalciferol. Follow up with PCP  4. GI/DVT prophylaxis. The patient will be on SCDs. 5. Asymptomatic bacteriuria. Will send urine for culture.          PENDING TEST RESULTS:   At the time of discharge the following test results are still pending:      FOLLOW UP APPOINTMENTS:    Follow-up Information     Follow up With Details Comments Contact Info     Carole Koyanagi, MD In 1 week   [address] [place] [address] [address] [facility] [address]  [phone]          follow up with your OB/GYN doctor after d/c in 1 week              ADDITIONAL CARE RECOMMENDATIONS:      DIET: Regular Diet        ACTIVITY: Activity as tolerated     WOUND CARE:      EQUIPMENT needed:         DISCHARGE MEDICATIONS:      Current Discharge Medication List           START taking these medications     Details   cyanocobalamin 1,000 mcg tablet Take 1 Tab by mouth daily for 30 days. Qty: 30 Tab, Refills: 0       ferrous sulfate 325 mg (65 mg iron) tablet Take 1 Tab by mouth two (2) times daily (with meals) for 30 days. Qty: 60 Tab, Refills: 0       multivitamin, tx-iron-ca-min (THERA-M W/ IRON) 9 mg iron-400 mcg tab tablet Take 1 Tab by mouth daily for 30 days.   Qty: 30 Tab, Refills: 0               CONTINUE these medications which have NOT CHANGED     Details   diphenhydrAMINE (BENADRYL) 25 mg capsule Take 75 mg by mouth nightly as needed for Sleep.                      DISPOSITION:  x  Home With:    OT   PT   HH   RN        Long term SNF/Inpatient Rehab     Independent/assisted living     Hospice     Other:         PATIENT CONDITION AT DISCHARGE:      Functional status     Poor      Deconditioned    x Independent       Cognition   x  Lucid      Forgetful      Dementia       Catheters/lines (plus indication)     Ruiz      PICC      PEG    x None       Code status    x Full code      DNR       PHYSICAL EXAMINATION AT DISCHARGE:                          Constitutional:  No acute distress, cooperative, pleasant    ENT:  Oral mucous moist, oropharynx benign. Neck supple,    Resp:  CTA bilaterally. No wheezing/rhonchi/rales. No accessory muscle use   CV:  Regular rhythm, normal rate, no murmurs, gallops, rubs    GI:  Soft, non distended, non tender. normoactive bowel sounds, no hepatosplenomegaly     Musculoskeletal:  No edema, warm, 2+ pulses throughout    Neurologic:  Moves all extremities.  AAOx3, CN II-XII reviewed                                             Psych:  Good insight, Not anxious nor agitated.        Signed:   Bear Ng MD  8/19/2017  8:53 AM      Significant Lab/Diagnostic Findings:   Lab Results   Component Value Date/Time    WBC 4.4 08/18/2017 06:25 AM    HGB 8.2 08/18/2017 12:53 PM    HCT 22.5 08/18/2017 06:25 AM    PLATELET 783 60/08/6140 06:25 AM    MCV 67.6 08/18/2017 06:25 AM     Lab Results   Component Value Date/Time    Sodium 139 08/17/2017 05:31 PM    Potassium 3.5 08/17/2017 05:31 PM    Chloride 106 08/17/2017 05:31 PM    CO2 26 08/17/2017 05:31 PM    Anion gap 7 08/17/2017 05:31 PM    Glucose 125 08/17/2017 05:31 PM    BUN 8 08/17/2017 05:31 PM    Creatinine 0.70 08/17/2017 05:31 PM    BUN/Creatinine ratio 11 08/17/2017 05:31 PM    GFR est AA >60 08/17/2017 05:31 PM    GFR est non-AA >60 08/17/2017 05:31 PM    Calcium 8.3 08/17/2017 05:31 PM    Bilirubin, total 0.2 08/17/2017 05:31 PM    ALT (SGPT) 18 08/17/2017 05:31 PM    AST (SGOT) 7 08/17/2017 05:31 PM    Alk. phosphatase 64 08/17/2017 05:31 PM    Protein, total 8.1 08/17/2017 05:31 PM    Albumin 3.8 08/17/2017 05:31 PM    Globulin 4.3 08/17/2017 05:31 PM    A-G Ratio 0.9 08/17/2017 05:31 PM        Home Health orders at discharge?  no      Medication Reconciliation completed: yes New medications at discharge include Vit D, Vit B12, Ferrous Sulfate, MVI    Support System consists of: family/children    Barriers to care?   no     Adherence to previous treatment and likelihood for f/u: good

## 2017-08-22 LAB
ALBUMIN SERPL ELPH-MCNC: 3.7 G/DL (ref 2.9–4.4)
ALBUMIN/GLOB SERPL: 1.3 {RATIO} (ref 0.7–1.7)
ALPHA1 GLOB SERPL ELPH-MCNC: 0.2 G/DL (ref 0–0.4)
ALPHA2 GLOB SERPL ELPH-MCNC: 0.5 G/DL (ref 0.4–1)
B-GLOBULIN SERPL ELPH-MCNC: 0.9 G/DL (ref 0.7–1.3)
GAMMA GLOB SERPL ELPH-MCNC: 1.3 G/DL (ref 0.4–1.8)
GLOBULIN SER CALC-MCNC: 2.9 G/DL (ref 2.2–3.9)
M PROTEIN SERPL ELPH-MCNC: NORMAL G/DL
PROT SERPL-MCNC: 6.6 G/DL (ref 6–8.5)

## 2017-08-28 ENCOUNTER — OFFICE VISIT (OUTPATIENT)
Dept: INTERNAL MEDICINE CLINIC | Age: 32
End: 2017-08-28

## 2017-08-28 VITALS
DIASTOLIC BLOOD PRESSURE: 70 MMHG | WEIGHT: 173.6 LBS | TEMPERATURE: 98.1 F | HEART RATE: 67 BPM | SYSTOLIC BLOOD PRESSURE: 120 MMHG | RESPIRATION RATE: 16 BRPM | OXYGEN SATURATION: 100 % | HEIGHT: 62 IN | BODY MASS INDEX: 31.94 KG/M2

## 2017-08-28 DIAGNOSIS — D50.8 IRON DEFICIENCY ANEMIA SECONDARY TO INADEQUATE DIETARY IRON INTAKE: Primary | ICD-10-CM

## 2017-08-28 DIAGNOSIS — Z98.84 S/P GASTRIC BYPASS: ICD-10-CM

## 2017-08-28 DIAGNOSIS — F51.02 ADJUSTMENT INSOMNIA: ICD-10-CM

## 2017-08-28 DIAGNOSIS — E55.9 VITAMIN D DEFICIENCY: ICD-10-CM

## 2017-08-28 PROBLEM — N63.20 MASSES OF BOTH BREASTS: Status: ACTIVE | Noted: 2017-08-28

## 2017-08-28 PROBLEM — N63.10 MASSES OF BOTH BREASTS: Status: ACTIVE | Noted: 2017-08-28

## 2017-08-28 RX ORDER — ERGOCALCIFEROL 1.25 MG/1
50000 CAPSULE ORAL
Qty: 8 CAP | Refills: 4 | Status: SHIPPED | OUTPATIENT
Start: 2017-08-28 | End: 2017-11-19

## 2017-08-28 NOTE — PROGRESS NOTES
HISTORY OF PRESENT ILLNESS  Kristy Lindsey is a 27 y.o. female. HPI  Transition Care Management:    Admission: 8/17/17  Discharge: 8/19/17  Location: [facility]. Velia's  Diagnosis:  Iron Deficiency Anemia   Nurse Navigator and Discharge Summary note: Reviewed     Anemia  Patient presents for presents evaluation of anemia. Anemia was found by routine CBC. It has been present for unknown months. Associated signs & symptoms: fatigue, dizziness/lightheadedness, and abdominal pain. She was seen by hematology in the hospital does not currently have a hematology appointment scheduled. .  Since starting iron supplements she reports dark stool. No constipation. She does notice improvement in her energy level. History of gastric bypass surgery  She has started her vitamin supplements. Does not like eating breakfast.  Noticed increased hunger since starting her supplements. Breast masses  She has appointment scheduled with her gynecologist for follow-up. Review of Systems   Constitutional: Negative for diaphoresis, fever and weight loss. Eyes: Negative for blurred vision and pain. Respiratory: Negative for shortness of breath. Cardiovascular: Negative for chest pain, orthopnea and leg swelling. Neurological: Negative for focal weakness and headaches. Psychiatric/Behavioral: Negative for depression. Patient Active Problem List    Diagnosis Date Noted    S/P gastric bypass 08/11/2017    Adjustment insomnia 08/11/2017       Current Outpatient Prescriptions   Medication Sig Dispense Refill    MELATONIN PO Take  by mouth as needed.  cyanocobalamin 1,000 mcg tablet Take 1 Tab by mouth daily for 30 days. 30 Tab 0    ferrous sulfate 325 mg (65 mg iron) tablet Take 1 Tab by mouth two (2) times daily (with meals) for 30 days. 60 Tab 0    multivitamin, tx-iron-ca-min (THERA-M W/ IRON) 9 mg iron-400 mcg tab tablet Take 1 Tab by mouth daily for 30 days.  30 Tab 0    cholecalciferol (VITAMIN D3) 1,000 unit tablet Take 2 Tabs by mouth daily for 30 days. 60 Tab 0    diphenhydrAMINE (BENADRYL) 25 mg capsule Take 75 mg by mouth nightly as needed for Sleep. No Known Allergies   Visit Vitals    /70 (BP 1 Location: Right arm, BP Patient Position: Sitting)    Pulse 67    Temp 98.1 °F (36.7 °C) (Oral)    Resp 16    Ht 5' 2\" (1.575 m)    Wt 173 lb 9.6 oz (78.7 kg)    SpO2 100%    BMI 31.75 kg/m2       Physical Exam   Constitutional: She is oriented to person, place, and time. She appears well-developed. No distress. Eyes: Conjunctivae are normal.   Neck: Neck supple. No thyromegaly present. Cardiovascular: Normal rate, regular rhythm and normal heart sounds. Pulmonary/Chest: Effort normal and breath sounds normal. No respiratory distress. She has no wheezes. She has no rales. She exhibits no tenderness. Lymphadenopathy:     She has no cervical adenopathy. Neurological: She is alert and oriented to person, place, and time. Skin: Skin is warm. Psychiatric: She has a normal mood and affect. ASSESSMENT and PLAN  Diagnoses and all orders for this visit:    1. Iron deficiency anemia secondary to inadequate dietary iron intake-we will recheck CBC in 1 month. Referral to hematology given. If appointment is past 1 month will order CBC and primary care but anticipate that she will be seen. Continue iron supplementation symptoms have improved. -     REFERRAL TO HEMATOLOGY    2. S/P gastric bypass switched to immediate release cobalamin. Continue to monitor vitamin levels levels  -     REFERRAL TO HEMATOLOGY    3. Adjustment insomniaworking on sleep hygiene takes Benadryl 50 mg as needed. 4. Vitamin D deficiency very low vitamin D level will continue high-dose vitamin D for the next 4 months and recheck to determine if she can switch to thousand daily. -     ergocalciferol (ERGOCALCIFEROL) 50,000 unit capsule; Take 1 Cap by mouth every seven (7) days.  Indications: VITAMIN D DEFICIENCY      Follow-up Disposition:  Return in about 3 months (around 11/28/2017) for Physical - 30 minute appointment. Medication risks/benefits/costs/interactions/alternatives discussed with patient. Kamille Belem  was given an after visit summary which includes diagnoses, current medications, & vitals. she expressed understanding with the diagnosis and plan.

## 2017-08-28 NOTE — PATIENT INSTRUCTIONS
It was a pleasure to see you again! I'm glad you are better.      -Your vitamin Dis  low--> I order high dose vitamin D which you will take weekly   Schedule with hematology regarding your anemia. Continue your vitamins      Iron-Rich Diet: Care Instructions  Your Care Instructions  Your body needs iron to make hemoglobin. Hemoglobin is a substance in red blood cells that carries oxygen from the lungs to cells all through your body. If you do not get enough iron, your body makes fewer and smaller red blood cells. As a result, your body's cells may not get enough oxygen. Adult men need 8 milligrams of iron a day; adult women need 18 milligrams of iron a day. After menopause, women need 8 milligrams of iron a day. A pregnant woman needs 27 milligrams of iron a day. Infants and young children have higher iron needs relative to their size than other age groups. People who have lost blood because of ulcers or heavy menstrual periods may become very low in iron and may develop anemia. Most people can get the iron their bodies need by eating enough of certain iron-rich foods. Your doctor may recommend that you take an iron supplement along with eating an iron-rich diet. Follow-up care is a key part of your treatment and safety. Be sure to make and go to all appointments, and call your doctor if you are having problems. Its also a good idea to know your test results and keep a list of the medicines you take. How can you care for yourself at home? · Make iron-rich foods a part of your daily diet. Iron-rich foods include:  ¨ All meats, such as chicken, beef, lamb, pork, fish, and shellfish. Liver is especially high in iron. ¨ Leafy green vegetables. ¨ Raisins, peas, beans, lentils, barley, and eggs. ¨ Iron-fortified breakfast cereals. · Eat foods with vitamin C along with iron-rich foods. Vitamin C helps you absorb more iron from food. Drink a glass of orange juice or another citrus juice with your food.   · Eat meat and vegetables or grains together. The iron in meat helps your body absorb the iron in other foods. Where can you learn more? Go to http://aysha-sherron.info/. Enter [id number] [id number] in the search box to learn more about \"Iron-Rich Diet: Care Instructions. \"  Current as of: July 26, 2016  Content Version: 11.3  © 8906-5944 [employer]. Care instructions adapted under license by [employer] (which disclaims liability or warranty for this information). If you have questions about a medical condition or this instruction, always ask your healthcare professional. [place] [address] any warranty or liability for your use of this information.

## 2017-08-28 NOTE — MR AVS SNAPSHOT
Visit Information Date & Time Provider Department Dept. Phone Encounter #  
 8/28/2017  1:30 PM Latosha Altamirano MD [address] [address] Internal Medicine [phone] [id number] Follow-up Instructions Return in about 3 months (around 11/28/2017) for Physical - 30 minute appointment. Upcoming Health Maintenance Date Due INFLUENZA AGE 9 TO ADULT 8/1/2017 PAP AKA CERVICAL CYTOLOGY 8/11/2019 DTaP/Tdap/Td series (2 - Td) 9/18/2022 Allergies as of 8/28/2017  Review Complete On: 8/28/2017 By: Amilcar Collins No Known Allergies Current Immunizations  Reviewed on 8/18/2017 Name Date TDAP Vaccine 9/18/2012 Not reviewed this visit You Were Diagnosed With   
  
 Codes Comments Iron deficiency anemia secondary to inadequate dietary iron intake    -  Primary ICD-10-CM: D50.8 ICD-9-CM: 280.1 S/P gastric bypass     ICD-10-CM: H63.95 ICD-9-CM: V45.86 Adjustment insomnia     ICD-10-CM: F51.02 
ICD-9-CM: 307.41 Vitamin D deficiency     ICD-10-CM: E55.9 ICD-9-CM: 268.9 Vitals BP Pulse Temp Resp Height(growth percentile) Weight(growth percentile) 120/70 (BP 1 Location: Right arm, BP Patient Position: Sitting) 67 98.1 °F (36.7 °C) (Oral) 16 5' 2\" (1.575 m) 173 lb 9.6 oz (78.7 kg) LMP SpO2 BMI OB Status Smoking Status 07/29/2017 (Exact Date) 100% 31.75 kg/m2 Having regular periods Former Smoker BMI and BSA Data Body Mass Index Body Surface Area 31.75 kg/m 2 1.86 m 2 Preferred Pharmacy Pharmacy Name Phone [address] [facility] [address] [facility] [phone] Your Updated Medication List  
  
   
This list is accurate as of: 8/28/17  2:09 PM.  Always use your most recent med list.  
  
  
  
  
 BENADRYL 25 mg capsule Generic drug:  diphenhydrAMINE Take 75 mg by mouth nightly as needed for Sleep. cyanocobalamin 1,000 mcg tablet Take 1 Tab by mouth daily for 30 days. ergocalciferol 50,000 unit capsule Commonly known as:  ERGOCALCIFEROL Take 1 Cap by mouth every seven (7) days. Indications: VITAMIN D DEFICIENCY  
  
 ferrous sulfate 325 mg (65 mg iron) tablet Take 1 Tab by mouth two (2) times daily (with meals) for 30 days. MELATONIN PO Take  by mouth as needed. multivitamin, tx-iron-ca-min 9 mg iron-400 mcg Tab tablet Commonly known as:  THERA-M w/ IRON Take 1 Tab by mouth daily for 30 days. Prescriptions Sent to Pharmacy Refills  
 ergocalciferol (ERGOCALCIFEROL) 50,000 unit capsule 4 Sig: Take 1 Cap by mouth every seven (7) days. Indications: VITAMIN D DEFICIENCY Class: Normal  
 Pharmacy: [employer] [facility] [address] [place] [address] [address] [facility] #: [phone] Route: Oral  
  
We Performed the Following REFERRAL TO HEMATOLOGY [MDR59 Custom] Comments:  
 Please evaluate patient for iron deficiency anemia Follow-up Instructions Return in about 3 months (around 11/28/2017) for Physical - 30 minute appointment. Referral Information Referral ID Referred By Referred To  
  
 [id number] GONZALO [address] [place] [address], [place]   
   [address] [place] [address] Phone: [phone] Fax: [phone] Visits Status Start Date End Date 1 New Request 8/28/17 8/28/18 If your referral has a status of pending review or denied, additional information will be sent to support the outcome of this decision. Patient Instructions It was a pleasure to see you again! I'm glad you are better. 
 
 
-Your vitamin Dis  low--> I order high dose vitamin D which you will take weekly Schedule with hematology regarding your anemia. Continue your vitamins Iron-Rich Diet: Care Instructions Your Care Instructions Your body needs iron to make hemoglobin. Hemoglobin is a substance in red blood cells that carries oxygen from the lungs to cells all through your body. If you do not get enough iron, your body makes fewer and smaller red blood cells. As a result, your body's cells may not get enough oxygen. Adult men need 8 milligrams of iron a day; adult women need 18 milligrams of iron a day. After menopause, women need 8 milligrams of iron a day. A pregnant woman needs 27 milligrams of iron a day. Infants and young children have higher iron needs relative to their size than other age groups. People who have lost blood because of ulcers or heavy menstrual periods may become very low in iron and may develop anemia. Most people can get the iron their bodies need by eating enough of certain iron-rich foods. Your doctor may recommend that you take an iron supplement along with eating an iron-rich diet. Follow-up care is a key part of your treatment and safety. Be sure to make and go to all appointments, and call your doctor if you are having problems. Its also a good idea to know your test results and keep a list of the medicines you take. How can you care for yourself at home? · Make iron-rich foods a part of your daily diet. Iron-rich foods include: ¨ All meats, such as chicken, beef, lamb, pork, fish, and shellfish. Liver is especially high in iron. ¨ Leafy green vegetables. ¨ Raisins, peas, beans, lentils, barley, and eggs. ¨ Iron-fortified breakfast cereals. · Eat foods with vitamin C along with iron-rich foods. Vitamin C helps you absorb more iron from food. Drink a glass of orange juice or another citrus juice with your food. · Eat meat and vegetables or grains together. The iron in meat helps your body absorb the iron in other foods. Where can you learn more? Go to http://aysha-sherron.info/. Enter [id number] in the search box to learn more about \"Iron-Rich Diet: Care Instructions. \" 
 Current as of: July 26, 2016 Content Version: 11.3 © 6829-5775 [employer]. Care instructions adapted under license by [employer] (which disclaims liability or warranty for this information). If you have questions about a medical condition or this instruction, always ask your healthcare professional. [address] any warranty or liability for your use of this information. Introducing [facility]! Dear Niyah Hidden: Thank you for requesting a ComVibe account. Our records indicate that you already have an active ComVibe account. You can access your account anytime at [email or web address]. [email or web address]/Hear It First Did you know that you can access your hospital and ER discharge instructions at any time in ComVibe? You can also review all of your test results from your hospital stay or ER visit. Additional Information If you have questions, please visit the Frequently Asked Questions section of the ComVibe website at [email or web address] Remember, ComVibe is NOT to be used for urgent needs. For medical emergencies, dial 911. Now available from your iPhone and Android! Please provide this summary of care documentation to your next provider. Your primary care clinician is listed as Kelsey Santoyo. If you have any questions after today's visit, please call [phone].

## 2017-10-05 ENCOUNTER — PATIENT OUTREACH (OUTPATIENT)
Dept: INTERNAL MEDICINE CLINIC | Age: 32
End: 2017-10-05

## 2017-10-05 NOTE — PROGRESS NOTES
[facility]    Transitional Care follow up call for completion of 30 day transition of care. Pt attended [facility] with Dr. Michele Samayoa as noted below. Called pt- no answer- LMTCB to [facility]LO [facility]OTE.     ASSESSMENT and PLAN  Diagnoses and all orders for this visit:     1. Iron deficiency anemia secondary to inadequate dietary iron intake-we will recheck CBC in 1 month. Referral to hematology given. If appointment is past 1 month will order CBC and primary care but anticipate that she will be seen. Continue iron supplementation symptoms have improved. -     REFERRAL TO HEMATOLOGY     2. S/P gastric bypass switched to immediate release cobalamin. Continue to monitor vitamin levels levels  -     REFERRAL TO HEMATOLOGY     3. Adjustment insomniaworking on sleep hygiene takes Benadryl 50 mg as needed.     4. Vitamin D deficiency very low vitamin D level will continue high-dose vitamin D for the next 4 months and recheck to determine if she can switch to thousand daily. -     ergocalciferol (ERGOCALCIFEROL) 50,000 unit capsule; Take 1 Cap by mouth every seven (7) days.  Indications: VITAMIN D DEFICIENCY      Follow-up Disposition:  Return in about 3 months (around 11/28/2017) for Physical - 30 minute appointment.

## 2017-10-11 ENCOUNTER — TELEPHONE (OUTPATIENT)
Dept: ONCOLOGY | Age: 32
End: 2017-10-11

## 2017-10-11 NOTE — TELEPHONE ENCOUNTER
Left voicemail requesting a call back. Called patient to set up a new patient appointment with Dr. Penelope Cohen. Patient was referred by Dr. Salvador Booth, for aki deficiency anemia. Waiting for a return call.

## 2017-11-19 ENCOUNTER — HOSPITAL ENCOUNTER (EMERGENCY)
Age: 32
Discharge: HOME | End: 2017-11-19
Attending: EMERGENCY MEDICINE
Payer: COMMERCIAL

## 2017-11-19 VITALS
TEMPERATURE: 97.9 F | BODY MASS INDEX: 31.83 KG/M2 | WEIGHT: 173 LBS | SYSTOLIC BLOOD PRESSURE: 144 MMHG | HEART RATE: 76 BPM | OXYGEN SATURATION: 98 % | DIASTOLIC BLOOD PRESSURE: 84 MMHG | RESPIRATION RATE: 16 BRPM | HEIGHT: 62 IN

## 2017-11-19 DIAGNOSIS — R53.83 FATIGUE, UNSPECIFIED TYPE: Primary | ICD-10-CM

## 2017-11-19 DIAGNOSIS — E87.6 HYPOKALEMIA: ICD-10-CM

## 2017-11-19 DIAGNOSIS — R73.9 HYPERGLYCEMIA: ICD-10-CM

## 2017-11-19 LAB
ALBUMIN SERPL-MCNC: 3.7 G/DL (ref 3.5–5)
ALBUMIN/GLOB SERPL: 0.9 {RATIO} (ref 1.1–2.2)
ALP SERPL-CCNC: 77 U/L (ref 45–117)
ALT SERPL-CCNC: 23 U/L (ref 12–78)
ANION GAP SERPL CALC-SCNC: 9 MMOL/L (ref 5–15)
AST SERPL-CCNC: 15 U/L (ref 15–37)
BASOPHILS # BLD: 0 K/UL (ref 0–0.1)
BASOPHILS NFR BLD: 0 % (ref 0–1)
BILIRUB SERPL-MCNC: 0.3 MG/DL (ref 0.2–1)
BUN SERPL-MCNC: 11 MG/DL (ref 6–20)
BUN/CREAT SERPL: 15 (ref 12–20)
CALCIUM SERPL-MCNC: 8.7 MG/DL (ref 8.5–10.1)
CHLORIDE SERPL-SCNC: 104 MMOL/L (ref 97–108)
CO2 SERPL-SCNC: 24 MMOL/L (ref 21–32)
CREAT SERPL-MCNC: 0.74 MG/DL (ref 0.55–1.02)
EOSINOPHIL # BLD: 0.1 K/UL (ref 0–0.4)
EOSINOPHIL NFR BLD: 1 % (ref 0–7)
ERYTHROCYTE [DISTWIDTH] IN BLOOD BY AUTOMATED COUNT: 16.8 % (ref 11.5–14.5)
GLOBULIN SER CALC-MCNC: 4.1 G/DL (ref 2–4)
GLUCOSE SERPL-MCNC: 149 MG/DL (ref 65–100)
HCG UR QL: NEGATIVE
HCT VFR BLD AUTO: 37.6 % (ref 35–47)
HGB BLD-MCNC: 12 G/DL (ref 11.5–16)
LYMPHOCYTES # BLD: 1.6 K/UL (ref 0.8–3.5)
LYMPHOCYTES NFR BLD: 25 % (ref 12–49)
MCH RBC QN AUTO: 26.9 PG (ref 26–34)
MCHC RBC AUTO-ENTMCNC: 31.9 G/DL (ref 30–36.5)
MCV RBC AUTO: 84.3 FL (ref 80–99)
MONOCYTES # BLD: 0.5 K/UL (ref 0–1)
MONOCYTES NFR BLD: 7 % (ref 5–13)
NEUTS SEG # BLD: 4.3 K/UL (ref 1.8–8)
NEUTS SEG NFR BLD: 67 % (ref 32–75)
PLATELET # BLD AUTO: 359 K/UL (ref 150–400)
POTASSIUM SERPL-SCNC: 3.4 MMOL/L (ref 3.5–5.1)
PROT SERPL-MCNC: 7.8 G/DL (ref 6.4–8.2)
RBC # BLD AUTO: 4.46 M/UL (ref 3.8–5.2)
SODIUM SERPL-SCNC: 137 MMOL/L (ref 136–145)
WBC # BLD AUTO: 6.6 K/UL (ref 3.6–11)

## 2017-11-19 PROCEDURE — 80053 COMPREHEN METABOLIC PANEL: CPT | Performed by: EMERGENCY MEDICINE

## 2017-11-19 PROCEDURE — 99283 EMERGENCY DEPT VISIT LOW MDM: CPT

## 2017-11-19 PROCEDURE — 85025 COMPLETE CBC W/AUTO DIFF WBC: CPT | Performed by: EMERGENCY MEDICINE

## 2017-11-19 PROCEDURE — 81025 URINE PREGNANCY TEST: CPT

## 2017-11-19 PROCEDURE — 36415 COLL VENOUS BLD VENIPUNCTURE: CPT | Performed by: EMERGENCY MEDICINE

## 2017-11-19 RX ORDER — BISMUTH SUBSALICYLATE 262 MG
1 TABLET,CHEWABLE ORAL DAILY
COMMUNITY
End: 2017-12-12 | Stop reason: SDUPTHER

## 2017-11-19 NOTE — DISCHARGE INSTRUCTIONS
Hypokalemia: Care Instructions  Your Care Instructions    Hypokalemia (say \"ex-os-wuh-STEVEN-eric-uh\") is a low level of potassium. The heart, muscles, kidneys, and nervous system all need potassium to work well. This problem has many different causes. Kidney problems, diet, and medicines like diuretics and laxatives can cause it. So can vomiting or diarrhea. In some cases, cancer is the cause. Your doctor may do tests to find the cause of your low potassium levels. You may need medicines to bring your potassium levels back to normal. You may also need regular blood tests to check your potassium. If you have very low potassium, you may need intravenous (IV) medicines. You also may need tests to check the electrical activity of your heart. Heart problems caused by low potassium levels can be very serious. Follow-up care is a key part of your treatment and safety. Be sure to make and go to all appointments, and call your doctor if you are having problems. It's also a good idea to know your test results and keep a list of the medicines you take. How can you care for yourself at home? · If your doctor recommends it, eat foods that have a lot of potassium. These include fresh fruits, juices, and vegetables. They also include nuts, beans, and milk. · Be safe with medicines. If your doctor prescribes medicines or potassium supplements, take them exactly as directed. Call your doctor if you have any problems with your medicines. · Get your potassium levels tested as often as your doctor tells you. When should you call for help? Call 911 anytime you think you may need emergency care. For example, call if:  ? · You feel like your heart is missing beats. Heart problems caused by low potassium can cause death. ? · You passed out (lost consciousness). ? · You have a seizure. ?Call your doctor now or seek immediate medical care if:  ? · You feel weak or unusually tired. ? · You have severe arm or leg cramps. ? · You have tingling or numbness. ? · You feel sick to your stomach, or you vomit. ? · You have belly cramps. ? · You feel bloated or constipated. ? · You have to urinate a lot. ? · You feel very thirsty most of the time. ? · You are dizzy or lightheaded, or you feel like you may faint. ? · You feel depressed, or you lose touch with reality. ? Watch closely for changes in your health, and be sure to contact your doctor if:  ? · You do not get better as expected. Where can you learn more? Go to http://aysha-sherron.info/. Enter G358 in the search box to learn more about \"Hypokalemia: Care Instructions. \"  Current as of: May 12, 2017  Content Version: 11.4  © 4977-8921 [employer]. Care instructions adapted under license by [employer] (which disclaims liability or warranty for this information). If you have questions about a medical condition or this instruction, always ask your healthcare professional. [address] any warranty or liability for your use of this information. Fatigue: Care Instructions  Your Care Instructions    Fatigue is a feeling of tiredness, exhaustion, or lack of energy. You may feel fatigue because of too much or not enough activity. It can also come from stress, lack of sleep, boredom, and poor diet. Many medical problems, such as viral infections, can cause fatigue. Emotional problems, especially depression, are often the cause of fatigue. Fatigue is most often a symptom of another problem. Treatment for fatigue depends on the cause. For example, if you have fatigue because you have a certain health problem, treating this problem also treats your fatigue. If depression or anxiety is the cause, treatment may help. Follow-up care is a key part of your treatment and safety. Be sure to make and go to all appointments, and call your doctor if you are having problems.  It's also a good idea to know your test results and keep a list of the medicines you take. How can you care for yourself at home? · Get regular exercise. But don't overdo it. Go back and forth between rest and exercise. · Get plenty of rest.  · Eat a healthy diet. Do not skip meals, especially breakfast.  · Reduce your use of caffeine, tobacco, and alcohol. Caffeine is most often found in coffee, tea, cola drinks, and chocolate. · Limit medicines that can cause fatigue. This includes tranquilizers and cold and allergy medicines. When should you call for help? Watch closely for changes in your health, and be sure to contact your doctor if:  ? · You have new symptoms such as fever or a rash. ? · Your fatigue gets worse. ? · You have been feeling down, depressed, or hopeless. Or you may have lost interest in things that you usually enjoy. ? · You are not getting better as expected. Where can you learn more? Go to http://aysha-sherron.info/. Enter M040 in the search box to learn more about \"Fatigue: Care Instructions. \"  Current as of: March 20, 2017  Content Version: 11.4  © 4910-5308 [employer]. Care instructions adapted under license by [employer] (which disclaims liability or warranty for this information). If you have questions about a medical condition or this instruction, always ask your healthcare professional. [address] any warranty or liability for your use of this information. Learning About High Blood Sugar  What is high blood sugar? Your body turns the food you eat into glucose (sugar), which it uses for energy. But if your body isn't able to use the sugar right away, it can build up in your blood and lead to high blood sugar. When the amount of sugar in your blood stays too high for too much of the time, you may have diabetes. Diabetes is a disease that can cause serious health problems.   The good news is that lifestyle changes may help you get your blood sugar back to normal and avoid or delay diabetes. What causes high blood sugar? Sugar (glucose) can build up in your blood if you:  · Are overweight. · Have a family history of diabetes. · Take certain medicines, such as steroids. What are the symptoms? Having high blood sugar may not cause any symptoms at all. Or it may make you feel very thirsty or very hungry. You may also urinate more often than usual, have blurry vision, or lose weight without trying. How is high blood sugar treated? You can take steps to lower your blood sugar level if you understand what makes it get higher. Your doctor may want you to learn how to test your blood sugar level at home. Then you can see how illness, stress, or different kinds of food or medicine raise or lower your blood sugar level. Other tests may be needed to see if you have diabetes. How can you prevent high blood sugar? · Watch your weight. If you're overweight, losing just a small amount of weight may help. Reducing fat around your waist is most important. · Limit the amount of calories, sweets, and unhealthy fat you eat. Ask your doctor if a dietitian can help you. A registered dietitian can help you create meal plans that fit your lifestyle. · Get at least 30 minutes of exercise on most days of the week. Exercise helps control your blood sugar. It also helps you maintain a healthy weight. Walking is a good choice. You also may want to do other activities, such as running, swimming, cycling, or playing tennis or team sports. · If your doctor prescribed medicines, take them exactly as prescribed. Call your doctor if you think you are having a problem with your medicine. You will get more details on the specific medicines your doctor prescribes. Follow-up care is a key part of your treatment and safety. Be sure to make and go to all appointments, and call your doctor if you are having problems.  It's also a good idea to know your test results and keep a list of the medicines you take. Where can you learn more? Go to http://aysha-sherron.info/. Enter O108 in the search box to learn more about \"Learning About High Blood Sugar. \"  Current as of: March 13, 2017  Content Version: 11.4  © 4457-6103 [employer]. Care instructions adapted under license by [employer] (which disclaims liability or warranty for this information). If you have questions about a medical condition or this instruction, always ask your healthcare professional. [address] any warranty or liability for your use of this information. We hope that we have addressed all of your medical concerns. The examination and treatment you received in the Emergency Department were for an emergent problem and were not intended as complete care. It is important that you follow up with your healthcare provider(s) for ongoing care. If your symptoms worsen or do not improve as expected, and you are unable to reach your usual health care provider(s), you should return to the Emergency Department. Today's healthcare is undergoing tremendous change, and patient satisfaction surveys are one of the many tools to assess the quality of medical care. You may receive a survey from the [employer] regarding your experience in the Emergency Department. I hope that your experience has been completely positive, particularly the medical care that I provided. As such, please participate in the survey; anything less than excellent does not meet my expectations or intentions. [address] [facility] and [address] [place] [address] participate in nationally recognized quality of care measures. If your blood pressure is greater than 120/80, as reported below, we urge that you seek medical care to address the potential of high blood pressure, commonly known as hypertension.    Hypertension can be hereditary or can be caused by certain medical conditions, pain, stress, or \"white coat syndrome. \"       Please make an appointment with your health care provider(s) for follow up of your Emergency Department visit. VITALS:   Patient Vitals for the past 8 hrs:   Temp Pulse Resp BP SpO2   11/19/17 1542 97.9 °F (36.6 °C) 81 18 142/83 99 %          Thank you for allowing us to provide you with medical care today. We realize that you have many choices for your emergency care needs. Please choose us in the future for any continued health care needs. Pio Faulkner, [address] [employer].   Office: [phone]            Recent Results (from the past 24 hour(s))   CBC WITH AUTOMATED DIFF    Collection Time: 11/19/17  3:51 PM   Result Value Ref Range    WBC 6.6 3.6 - 11.0 K/uL    RBC 4.46 3.80 - 5.20 M/uL    HGB 12.0 11.5 - 16.0 g/dL    HCT 37.6 35.0 - 47.0 %    MCV 84.3 80.0 - 99.0 FL    MCH 26.9 26.0 - 34.0 PG    MCHC 31.9 30.0 - 36.5 g/dL    RDW 16.8 (H) 11.5 - 14.5 %    PLATELET 321 880 - 944 K/uL    NEUTROPHILS 67 32 - 75 %    LYMPHOCYTES 25 12 - 49 %    MONOCYTES 7 5 - 13 %    EOSINOPHILS 1 0 - 7 %    BASOPHILS 0 0 - 1 %    ABS. NEUTROPHILS 4.3 1.8 - 8.0 K/UL    ABS. LYMPHOCYTES 1.6 0.8 - 3.5 K/UL    ABS. MONOCYTES 0.5 0.0 - 1.0 K/UL    ABS. EOSINOPHILS 0.1 0.0 - 0.4 K/UL    ABS.  BASOPHILS 0.0 0.0 - 0.1 K/UL   METABOLIC PANEL, COMPREHENSIVE    Collection Time: 11/19/17  3:51 PM   Result Value Ref Range    Sodium 137 136 - 145 mmol/L    Potassium 3.4 (L) 3.5 - 5.1 mmol/L    Chloride 104 97 - 108 mmol/L    CO2 24 21 - 32 mmol/L    Anion gap 9 5 - 15 mmol/L    Glucose 149 (H) 65 - 100 mg/dL    BUN 11 6 - 20 MG/DL    Creatinine 0.74 0.55 - 1.02 MG/DL    BUN/Creatinine ratio 15 12 - 20      GFR est AA >60 >60 ml/min/1.73m2    GFR est non-AA >60 >60 ml/min/1.73m2    Calcium 8.7 8.5 - 10.1 MG/DL    Bilirubin, total 0.3 0.2 - 1.0 MG/DL    ALT (SGPT) 23 12 - 78 U/L    AST (SGOT) 15 15 - 37 U/L Alk. phosphatase 77 45 - 117 U/L    Protein, total 7.8 6.4 - 8.2 g/dL    Albumin 3.7 3.5 - 5.0 g/dL    Globulin 4.1 (H) 2.0 - 4.0 g/dL    A-G Ratio 0.9 (L) 1.1 - 2.2     HCG URINE, QL. - POC    Collection Time: 11/19/17  4:16 PM   Result Value Ref Range    Pregnancy test,urine (POC) NEGATIVE  NEG         No results found.

## 2017-11-19 NOTE — ED NOTES
Discharge instructions reviewed with and given to pt by ER RN and MD.  No obvious distress noted at time of discharge, ambulatory on own accord for discharge.

## 2017-11-19 NOTE — ED NOTES
Pt. Tells RN and MD that her menstrual cycles are heavy, last about 5 days and the first 2 days are the heaviest.

## 2017-11-19 NOTE — ED TRIAGE NOTES
Pt presents with complaints of fatigue and hair loss over the past 3 weeks. Pt states that she has a history of anemia and had these same symptoms when she needed a blood transfusion a few months ago.

## 2017-11-19 NOTE — ED PROVIDER NOTES
HPI Comments: 27 y.o. female with past medical history significant for transient elevated BP who presents from home with chief complaint of fatigue. Pt c/o paleness, hair loss, fatigue onset about 3 weeks ago. Pt reports that she was seen in the ED for similar sx a few months ago and her blood indication low iron and hemoglobin levels, and she required a transfusion. Pt reports that her periods are heavy, with the first two days being the heaviest. Pt has had gastric bypass surgery. Pt reports taking vitamins but is afraid that her body is not absorbing them correctly. Pt denies SOB, CP, abdominal pain. There are no other acute medical concerns at this time. Social hx: former smoker (quit ), +EtOH    PCP: Syd Khan MD    Note written by Ignacio Viramontes, as dictated by Spike Kapadia MD 4:04 PM      The history is provided by the patient. No  was used. Past Medical History:   Diagnosis Date    Nausea & vomiting     Transient elevated blood pressure        Past Surgical History:   Procedure Laterality Date    HX BREAST BIOPSY      HX  SECTION  9/10    HX DILATION AND CURETTAGE      HX OTHER SURGICAL      gastric banding. Family History:   Problem Relation Age of Onset    Hypertension Mother     Diabetes Mother     Hypertension Father     Diabetes Father     Cancer Father      lung    Hypertension Brother     Hypertension Sister        Social History     Social History    Marital status:      Spouse name: N/A    Number of children: N/A    Years of education: N/A     Occupational History    Not on file.      Social History Main Topics    Smoking status: Former Smoker     Years: 6.00     Quit date: 2013    Smokeless tobacco: Never Used    Alcohol use 0.0 oz/week      Comment: occassionally    Drug use: No    Sexual activity: Yes     Partners: Male     Other Topics Concern    Not on file     Social History Narrative         ALLERGIES: Review of patient's allergies indicates no known allergies. Review of Systems   Constitutional: Positive for fatigue. Negative for chills and fever. HENT: Negative for congestion and rhinorrhea. Respiratory: Negative for cough and shortness of breath. Cardiovascular: Negative for chest pain. Gastrointestinal: Negative for abdominal pain, diarrhea, nausea and vomiting. Genitourinary: Negative for difficulty urinating and dysuria. Musculoskeletal: Negative for back pain and neck pain. Skin: Positive for pallor. Neurological: Negative for syncope and headaches. All other systems reviewed and are negative.       Vitals:    11/19/17 1542   BP: 142/83   Pulse: 81   Resp: 18   Temp: 97.9 °F (36.6 °C)   SpO2: 99%   Weight: 78.5 kg (173 lb)   Height: 5' 2\" (1.575 m)            Physical Exam   Physical Examination: General appearance - alert, well appearing, and in no distress, oriented to person, place, and time and normal appearing weight  Eyes - pupils equal and reactive, extraocular eye movements intact  Neck - supple, no significant adenopathy  Chest - clear to auscultation, no wheezes, rales or rhonchi, symmetric air entry  Heart - normal rate, regular rhythm, normal S1, S2, no murmurs, rubs, clicks or gallops  Abdomen - soft, nontender, nondistended, no masses or organomegaly  Back exam - full range of motion, no tenderness, palpable spasm or pain on motion  Neurological - alert, oriented, normal speech, no focal findings or movement disorder noted  Musculoskeletal - no joint tenderness, deformity or swelling  Extremities - peripheral pulses normal, no pedal edema, no clubbing or cyanosis  Skin - normal coloration and turgor, no rashes, no suspicious skin lesions noted  MDM  Number of Diagnoses or Management Options  Fatigue, unspecified type:   Hyperglycemia:   Hypokalemia:      Amount and/or Complexity of Data Reviewed  Clinical lab tests: ordered and reviewed  Decide to obtain previous medical records or to obtain history from someone other than the patient: yes  Review and summarize past medical records: yes    Patient Progress  Patient progress: stable    ED Course       Procedures    Hemoglobin WNL. F/u with pcp for further evaluation of hair loss.

## 2017-11-20 ENCOUNTER — TELEPHONE (OUTPATIENT)
Dept: INTERNAL MEDICINE CLINIC | Age: 32
End: 2017-11-20

## 2017-12-04 ENCOUNTER — OFFICE VISIT (OUTPATIENT)
Dept: INTERNAL MEDICINE CLINIC | Age: 32
End: 2017-12-04

## 2017-12-04 VITALS
HEIGHT: 62 IN | OXYGEN SATURATION: 99 % | RESPIRATION RATE: 19 BRPM | BODY MASS INDEX: 32.54 KG/M2 | TEMPERATURE: 98.2 F | HEART RATE: 78 BPM | DIASTOLIC BLOOD PRESSURE: 70 MMHG | WEIGHT: 176.8 LBS | SYSTOLIC BLOOD PRESSURE: 120 MMHG

## 2017-12-04 DIAGNOSIS — D50.8 OTHER IRON DEFICIENCY ANEMIA: ICD-10-CM

## 2017-12-04 DIAGNOSIS — Z98.84 S/P GASTRIC BYPASS: ICD-10-CM

## 2017-12-04 DIAGNOSIS — L21.9 SEBORRHEIC DERMATITIS OF SCALP: ICD-10-CM

## 2017-12-04 DIAGNOSIS — L65.9 HAIR LOSS: ICD-10-CM

## 2017-12-04 DIAGNOSIS — R53.83 OTHER FATIGUE: Primary | ICD-10-CM

## 2017-12-04 RX ORDER — LANOLIN ALCOHOL/MO/W.PET/CERES
1000 CREAM (GRAM) TOPICAL DAILY
Qty: 90 TAB | Refills: 2 | Status: SHIPPED | OUTPATIENT
Start: 2017-12-04 | End: 2018-12-31 | Stop reason: ALTCHOICE

## 2017-12-04 NOTE — PATIENT INSTRUCTIONS
It was a pleasure to see you! As discussed:    Fatigue  I have ordered additional labs to check for the cause of your symptoms. In the interim work on optimizing stress relief and exercising. Wear a fitness tracker to make sure that you get at least 10,000 steps a day. Get at least 7 hours asleep and stay well-hydrated  Return for reevaluation if your symptoms worsen acutely. Hair loss  I have ordered a shampoo to treat your dandruff this may help with the hair loss. Schedule with a dermatologist for further evaluation. Fatigue: Care Instructions  Your Care Instructions    Fatigue is a feeling of tiredness, exhaustion, or lack of energy. You may feel fatigue because of too much or not enough activity. It can also come from stress, lack of sleep, boredom, and poor diet. Many medical problems, such as viral infections, can cause fatigue. Emotional problems, especially depression, are often the cause of fatigue. Fatigue is most often a symptom of another problem. Treatment for fatigue depends on the cause. For example, if you have fatigue because you have a certain health problem, treating this problem also treats your fatigue. If depression or anxiety is the cause, treatment may help. Follow-up care is a key part of your treatment and safety. Be sure to make and go to all appointments, and call your doctor if you are having problems. It's also a good idea to know your test results and keep a list of the medicines you take. How can you care for yourself at home? · Get regular exercise. But don't overdo it. Go back and forth between rest and exercise. · Get plenty of rest.  · Eat a healthy diet. Do not skip meals, especially breakfast.  · Reduce your use of caffeine, tobacco, and alcohol. Caffeine is most often found in coffee, tea, cola drinks, and chocolate. · Limit medicines that can cause fatigue. This includes tranquilizers and cold and allergy medicines. When should you call for help?   Watch closely for changes in your health, and be sure to contact your doctor if:  ? · You have new symptoms such as fever or a rash. ? · Your fatigue gets worse. ? · You have been feeling down, depressed, or hopeless. Or you may have lost interest in things that you usually enjoy. ? · You are not getting better as expected. Where can you learn more? Go to http://aysha-sherron.info/. Enter Y576 in the search box to learn more about \"Fatigue: Care Instructions. \"  Current as of: March 20, 2017  Content Version: 11.4  © 4341-4766 [employer]. Care instructions adapted under license by [employer] (which disclaims liability or warranty for this information). If you have questions about a medical condition or this instruction, always ask your healthcare professional. [address] any warranty or liability for your use of this information.
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
## 2017-12-05 LAB
FERRITIN SERPL-MCNC: 15 NG/ML (ref 15–150)
IRON SATN MFR SERPL: 11 % (ref 15–55)
IRON SERPL-MCNC: 41 UG/DL (ref 27–159)
TIBC SERPL-MCNC: 375 UG/DL (ref 250–450)
UIBC SERPL-MCNC: 334 UG/DL (ref 131–425)
VIT B1 BLD-SCNC: NORMAL NMOL/L
VIT B12 SERPL-MCNC: 563 PG/ML (ref 232–1245)

## 2017-12-08 RX ORDER — ERGOCALCIFEROL 1.25 MG/1
50000 CAPSULE ORAL
COMMUNITY
End: 2018-12-31 | Stop reason: ALTCHOICE

## 2017-12-08 RX ORDER — LANOLIN ALCOHOL/MO/W.PET/CERES
500 CREAM (GRAM) TOPICAL DAILY
COMMUNITY
End: 2018-12-31 | Stop reason: ALTCHOICE

## 2017-12-12 ENCOUNTER — TELEPHONE (OUTPATIENT)
Dept: INTERNAL MEDICINE CLINIC | Age: 32
End: 2017-12-12

## 2017-12-12 RX ORDER — BISMUTH SUBSALICYLATE 262 MG
1 TABLET,CHEWABLE ORAL DAILY
Qty: 60 TAB | Refills: 1 | Status: SHIPPED | OUTPATIENT
Start: 2017-12-12 | End: 2018-12-31 | Stop reason: ALTCHOICE

## 2017-12-12 RX ORDER — LANOLIN ALCOHOL/MO/W.PET/CERES
325 CREAM (GRAM) TOPICAL 2 TIMES DAILY
Qty: 60 TAB | Refills: 1 | Status: SHIPPED | OUTPATIENT
Start: 2017-12-12 | End: 2018-12-31 | Stop reason: ALTCHOICE

## 2018-01-21 NOTE — PROGRESS NOTES
Happy New year Ms. Izabela Rambhargavi you for using my chart. As you have seen your labs have improved significantly. Your vitamin levels are in good range. Do not hesitate to contact the office if you have any questions or concerns before your next appointment.    Kind regards,   Dr. Geneva Mcpherson

## 2018-01-26 NOTE — PROGRESS NOTES
Problem: Falls - Risk of  Goal: *Absence of Falls  Document Ese Fall Risk and appropriate interventions in the flowsheet.    Outcome: Progressing Towards Goal  Fall Risk Interventions:              Medication Interventions: Teach patient to arise slowly, Patient to call before getting OOB 5

## 2018-10-20 ENCOUNTER — APPOINTMENT (OUTPATIENT)
Dept: CT IMAGING | Age: 33
End: 2018-10-20
Attending: NURSE PRACTITIONER
Payer: COMMERCIAL

## 2018-10-20 ENCOUNTER — APPOINTMENT (OUTPATIENT)
Dept: GENERAL RADIOLOGY | Age: 33
End: 2018-10-20
Attending: NURSE PRACTITIONER
Payer: COMMERCIAL

## 2018-10-20 ENCOUNTER — HOSPITAL ENCOUNTER (EMERGENCY)
Age: 33
Discharge: HOME | End: 2018-10-20
Attending: EMERGENCY MEDICINE
Payer: COMMERCIAL

## 2018-10-20 VITALS
HEART RATE: 70 BPM | DIASTOLIC BLOOD PRESSURE: 84 MMHG | SYSTOLIC BLOOD PRESSURE: 127 MMHG | HEIGHT: 63 IN | TEMPERATURE: 98 F | BODY MASS INDEX: 32.81 KG/M2 | WEIGHT: 185.19 LBS | RESPIRATION RATE: 16 BRPM | OXYGEN SATURATION: 100 %
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
## 2018-10-20 DIAGNOSIS — R51.9 ACUTE NONINTRACTABLE HEADACHE, UNSPECIFIED HEADACHE TYPE: ICD-10-CM

## 2018-10-20 DIAGNOSIS — S90.32XA CONTUSION OF LEFT FOOT, INITIAL ENCOUNTER: ICD-10-CM

## 2018-10-20 PROCEDURE — 99283 EMERGENCY DEPT VISIT LOW MDM: CPT

## 2018-10-20 PROCEDURE — 70450 CT HEAD/BRAIN W/O DYE: CPT

## 2018-10-20 PROCEDURE — 73630 X-RAY EXAM OF FOOT: CPT

## 2018-10-20 PROCEDURE — 99284 EMERGENCY DEPT VISIT MOD MDM: CPT

## 2018-10-20 RX ORDER — IBUPROFEN 600 MG/1
600 TABLET ORAL
Qty: 20 TAB | Refills: 0 | Status: SHIPPED | OUTPATIENT
Start: 2018-10-20 | End: 2018-10-20 | Stop reason: ALTCHOICE

## 2018-10-20 RX ORDER — TRAMADOL HYDROCHLORIDE 50 MG/1
50 TABLET ORAL
Qty: 15 TAB | Refills: 0 | Status: SHIPPED | OUTPATIENT
Start: 2018-10-20 | End: 2018-10-31
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
## 2018-10-31 ENCOUNTER — OFFICE VISIT (OUTPATIENT)
Dept: INTERNAL MEDICINE CLINIC | Age: 33
End: 2018-10-31

## 2018-10-31 VITALS
TEMPERATURE: 98.2 F | SYSTOLIC BLOOD PRESSURE: 118 MMHG | HEIGHT: 63 IN | RESPIRATION RATE: 11 BRPM | HEART RATE: 68 BPM | BODY MASS INDEX: 33.95 KG/M2 | WEIGHT: 191.6 LBS | DIASTOLIC BLOOD PRESSURE: 82 MMHG

## 2018-10-31 DIAGNOSIS — M79.672 PAIN IN BOTH FEET: ICD-10-CM

## 2018-10-31 DIAGNOSIS — R10.13 EPIGASTRIC PAIN: ICD-10-CM

## 2018-10-31 DIAGNOSIS — M54.42 ACUTE BILATERAL LOW BACK PAIN WITH BILATERAL SCIATICA: ICD-10-CM

## 2018-10-31 DIAGNOSIS — M54.41 ACUTE BILATERAL LOW BACK PAIN WITH BILATERAL SCIATICA: ICD-10-CM

## 2018-10-31 DIAGNOSIS — D50.8 OTHER IRON DEFICIENCY ANEMIA: Primary | ICD-10-CM

## 2018-10-31 DIAGNOSIS — Z87.11 H/O GASTRIC ULCER: ICD-10-CM

## 2018-10-31 DIAGNOSIS — Z98.84 S/P GASTRIC BYPASS: ICD-10-CM
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
## 2018-10-31 DIAGNOSIS — R41.0 CONFUSION: ICD-10-CM

## 2018-10-31 DIAGNOSIS — M79.671 PAIN IN BOTH FEET: ICD-10-CM

## 2018-10-31 RX ORDER — CYCLOBENZAPRINE HCL 5 MG
TABLET ORAL
Qty: 45 TAB | Refills: 0 | Status: SHIPPED | OUTPATIENT
Start: 2018-10-31 | End: 2018-12-31 | Stop reason: ALTCHOICE

## 2018-10-31 NOTE — PATIENT INSTRUCTIONS
It was a pleasure to see you! As discussed: 
 
Complete labs and xrays ordered Schedule with Orthopedics. Back Pain: Care Instructions Your Care Instructions Back pain has many possible causes. It is often related to problems with muscles and ligaments of the back. It may also be related to problems with the nerves, discs, or bones of the back. Moving, lifting, standing, sitting, or sleeping in an awkward way can strain the back. Sometimes you don't notice the injury until later. Arthritis is another common cause of back pain. Although it may hurt a lot, back pain usually improves on its own within several weeks. Most people recover in 12 weeks or less. Using good home treatment and being careful not to stress your back can help you feel better sooner. Follow-up care is a key part of your treatment and safety. Be sure to make and go to all appointments, and call your doctor if you are having problems. It's also a good idea to know your test results and keep a list of the medicines you take. How can you care for yourself at home? · Sit or lie in positions that are most comfortable and reduce your pain. Try one of these positions when you lie down: ? Lie on your back with your knees bent and supported by large pillows. ? Lie on the floor with your legs on the seat of a sofa or chair. ? Lie on your side with your knees and hips bent and a pillow between your legs. ? Lie on your stomach if it does not make pain worse. · Do not sit up in bed, and avoid soft couches and twisted positions. Bed rest can help relieve pain at first, but it delays healing. Avoid bed rest after the first day of back pain. · Change positions every 30 minutes. If you must sit for long periods of time, take breaks from sitting. Get up and walk around, or lie in a comfortable position.  
· Try using a heating pad on a low or medium setting for 15 to 20 minutes every 2 or 3 hours. Try a warm shower in place of one session with the heating pad. · You can also try an ice pack for 10 to 15 minutes every 2 to 3 hours. Put a thin cloth between the ice pack and your skin. · Take pain medicines exactly as directed. ? If the doctor gave you a prescription medicine for pain, take it as prescribed. ? If you are not taking a prescription pain medicine, ask your doctor if you can take an over-the-counter medicine. · Take short walks several times a day. You can start with 5 to 10 minutes, 3 or 4 times a day, and work up to longer walks. Walk on level surfaces and avoid hills and stairs until your back is better. · Return to work and other activities as soon as you can. Continued rest without activity is usually not good for your back. · To prevent future back pain, do exercises to stretch and strengthen your back and stomach. Learn how to use good posture, safe lifting techniques, and proper body mechanics. When should you call for help? Call your doctor now or seek immediate medical care if: 
  · You have new or worsening numbness in your legs.  
  · You have new or worsening weakness in your legs. (This could make it hard to stand up.)  
  · You lose control of your bladder or bowels.  
 Watch closely for changes in your health, and be sure to contact your doctor if: 
  · You have a fever, lose weight, or don't feel well.  
  · You do not get better as expected. Where can you learn more? Go to http://aysha-sherron.info/. Enter O490 in the search box to learn more about \"Back Pain: Care Instructions. \" Current as of: November 29, 2017 Content Version: 11.8 © 9770-4350 [employer]. Care instructions adapted under license by [employer] (which disclaims liability or warranty for this information).  If you have questions about a medical condition or this instruction, always ask your healthcare professional. [employer] disclaims any warranty or liability for your use of this information.
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
## 2018-11-03 LAB
ALBUMIN SERPL-MCNC: 4.3 G/DL (ref 3.5–5.5)
ALBUMIN/GLOB SERPL: 1.4 {RATIO} (ref 1.2–2.2)
ALP SERPL-CCNC: 65 IU/L (ref 39–117)
ALT SERPL-CCNC: 12 IU/L (ref 0–32)
AST SERPL-CCNC: 15 IU/L (ref 0–40)
BASOPHILS # BLD AUTO: 0 X10E3/UL (ref 0–0.2)
BASOPHILS NFR BLD AUTO: 1 %
BILIRUB SERPL-MCNC: <0.2 MG/DL (ref 0–1.2)
BUN SERPL-MCNC: 6 MG/DL (ref 6–20)
BUN/CREAT SERPL: 12 (ref 9–23)
CALCIUM SERPL-MCNC: 9 MG/DL (ref 8.7–10.2)
CHLORIDE SERPL-SCNC: 102 MMOL/L (ref 96–106)
CO2 SERPL-SCNC: 26 MMOL/L (ref 20–29)
CREAT SERPL-MCNC: 0.51 MG/DL (ref 0.57–1)
EOSINOPHIL # BLD AUTO: 0.2 X10E3/UL (ref 0–0.4)
EOSINOPHIL NFR BLD AUTO: 3 %
ERYTHROCYTE [DISTWIDTH] IN BLOOD BY AUTOMATED COUNT: 15.1 % (ref 12.3–15.4)
FERRITIN SERPL-MCNC: 9 NG/ML (ref 15–150)
GLOBULIN SER CALC-MCNC: 3 G/DL (ref 1.5–4.5)
GLUCOSE SERPL-MCNC: 78 MG/DL (ref 65–99)
HCT VFR BLD AUTO: 34.1 % (ref 34–46.6)
HGB BLD-MCNC: 10.6 G/DL (ref 11.1–15.9)
IMM GRANULOCYTES # BLD: 0 X10E3/UL (ref 0–0.1)
IMM GRANULOCYTES NFR BLD: 0 %
IRON SATN MFR SERPL: 5 % (ref 15–55)
IRON SERPL-MCNC: 20 UG/DL (ref 27–159)
LYMPHOCYTES # BLD AUTO: 1.2 X10E3/UL (ref 0.7–3.1)
LYMPHOCYTES NFR BLD AUTO: 24 %
MCH RBC QN AUTO: 25.7 PG (ref 26.6–33)
MCHC RBC AUTO-ENTMCNC: 31.1 G/DL (ref 31.5–35.7)
MCV RBC AUTO: 83 FL (ref 79–97)
MONOCYTES # BLD AUTO: 0.8 X10E3/UL (ref 0.1–0.9)
MONOCYTES NFR BLD AUTO: 17 %
NEUTROPHILS # BLD AUTO: 2.7 X10E3/UL (ref 1.4–7)
NEUTROPHILS NFR BLD AUTO: 55 %
PLATELET # BLD AUTO: 375 X10E3/UL (ref 150–379)
POTASSIUM SERPL-SCNC: 4.2 MMOL/L (ref 3.5–5.2)
PROT SERPL-MCNC: 7.3 G/DL (ref 6–8.5)
RBC # BLD AUTO: 4.12 X10E6/UL (ref 3.77–5.28)
SODIUM SERPL-SCNC: 140 MMOL/L (ref 134–144)
T4 FREE SERPL-MCNC: 1.05 NG/DL (ref 0.82–1.77)
TIBC SERPL-MCNC: 386 UG/DL (ref 250–450)
TSH SERPL DL<=0.005 MIU/L-ACNC: 1.39 UIU/ML (ref 0.45–4.5)
UIBC SERPL-MCNC: 366 UG/DL (ref 131–425)
VIT B1 BLD-SCNC: 83 NMOL/L (ref 66.5–200)
VIT B12 SERPL-MCNC: 438 PG/ML (ref 232–1245)
WBC # BLD AUTO: 4.9 X10E3/UL (ref 3.4–10.8)

## 2018-11-20 ENCOUNTER — TELEPHONE (OUTPATIENT)
Dept: INTERNAL MEDICINE CLINIC | Age: 33
End: 2018-11-20

## 2018-11-20 NOTE — PROGRESS NOTES
Please let Abelardo Singh know her anemia has slightly worsened but is not critically low. Given her intolerance of oral iron she should see the hematology on 11/21/18 as scheduled to discuss other treatment options.   
Remainder of labs B12, B1, TSH at   Goal

## 2018-11-21 ENCOUNTER — OFFICE VISIT (OUTPATIENT)
Dept: ONCOLOGY | Age: 33
End: 2018-11-21

## 2018-11-21 ENCOUNTER — DOCUMENTATION ONLY (OUTPATIENT)
Dept: INTERNAL MEDICINE CLINIC | Age: 33
End: 2018-11-21

## 2018-11-21 VITALS
HEIGHT: 63 IN | SYSTOLIC BLOOD PRESSURE: 120 MMHG | HEART RATE: 75 BPM | WEIGHT: 186.2 LBS | DIASTOLIC BLOOD PRESSURE: 83 MMHG | TEMPERATURE: 98.7 F | RESPIRATION RATE: 20 BRPM | BODY MASS INDEX: 32.99 KG/M2 | OXYGEN SATURATION: 98 %

## 2018-11-21 DIAGNOSIS — Z98.84 S/P GASTRIC BYPASS: ICD-10-CM

## 2018-11-21 DIAGNOSIS — D64.9 ANEMIA, UNSPECIFIED TYPE: Primary | ICD-10-CM

## 2018-11-21 DIAGNOSIS — E66.09 CLASS 1 OBESITY DUE TO EXCESS CALORIES WITHOUT SERIOUS COMORBIDITY WITH BODY MASS INDEX (BMI) OF 32.0 TO 32.9 IN ADULT: ICD-10-CM

## 2018-11-21 RX ORDER — ACETAMINOPHEN 325 MG/1
650 TABLET ORAL AS NEEDED
Status: CANCELLED
Start: 2018-11-28

## 2018-11-21 RX ORDER — ONDANSETRON 2 MG/ML
8 INJECTION INTRAMUSCULAR; INTRAVENOUS AS NEEDED
Status: CANCELLED | OUTPATIENT
Start: 2018-11-28

## 2018-11-21 RX ORDER — DIPHENHYDRAMINE HYDROCHLORIDE 50 MG/ML
50 INJECTION, SOLUTION INTRAMUSCULAR; INTRAVENOUS AS NEEDED
Status: CANCELLED
Start: 2018-12-07

## 2018-11-21 RX ORDER — HEPARIN 100 UNIT/ML
300-500 SYRINGE INTRAVENOUS AS NEEDED
Status: CANCELLED
Start: 2018-12-07

## 2018-11-21 RX ORDER — ONDANSETRON 2 MG/ML
8 INJECTION INTRAMUSCULAR; INTRAVENOUS AS NEEDED
Status: CANCELLED | OUTPATIENT
Start: 2018-12-07

## 2018-11-21 RX ORDER — SODIUM CHLORIDE 9 MG/ML
10 INJECTION INTRAMUSCULAR; INTRAVENOUS; SUBCUTANEOUS AS NEEDED
Status: CANCELLED | OUTPATIENT
Start: 2018-11-28

## 2018-11-21 RX ORDER — ACETAMINOPHEN 325 MG/1
650 TABLET ORAL AS NEEDED
Status: CANCELLED
Start: 2018-12-07

## 2018-11-21 RX ORDER — HEPARIN 100 UNIT/ML
300-500 SYRINGE INTRAVENOUS AS NEEDED
Status: CANCELLED
Start: 2018-11-28

## 2018-11-21 RX ORDER — EPINEPHRINE 1 MG/ML
0.3 INJECTION, SOLUTION, CONCENTRATE INTRAVENOUS AS NEEDED
Status: CANCELLED | OUTPATIENT
Start: 2018-11-28

## 2018-11-21 RX ORDER — SODIUM CHLORIDE 0.9 % (FLUSH) 0.9 %
10 SYRINGE (ML) INJECTION AS NEEDED
Status: CANCELLED
Start: 2018-11-28

## 2018-11-21 RX ORDER — ALBUTEROL SULFATE 0.83 MG/ML
2.5 SOLUTION RESPIRATORY (INHALATION) AS NEEDED
Status: CANCELLED
Start: 2018-11-28

## 2018-11-21 RX ORDER — SODIUM CHLORIDE 0.9 % (FLUSH) 0.9 %
10 SYRINGE (ML) INJECTION AS NEEDED
Status: CANCELLED
Start: 2018-12-07

## 2018-11-21 RX ORDER — SODIUM CHLORIDE 9 MG/ML
10 INJECTION INTRAMUSCULAR; INTRAVENOUS; SUBCUTANEOUS AS NEEDED
Status: CANCELLED | OUTPATIENT
Start: 2018-12-07

## 2018-11-21 RX ORDER — EPINEPHRINE 1 MG/ML
0.3 INJECTION, SOLUTION, CONCENTRATE INTRAVENOUS AS NEEDED
Status: CANCELLED | OUTPATIENT
Start: 2018-12-07

## 2018-11-21 RX ORDER — DIPHENHYDRAMINE HYDROCHLORIDE 50 MG/ML
50 INJECTION, SOLUTION INTRAMUSCULAR; INTRAVENOUS AS NEEDED
Status: CANCELLED
Start: 2018-11-28

## 2018-11-21 RX ORDER — ALBUTEROL SULFATE 0.83 MG/ML
2.5 SOLUTION RESPIRATORY (INHALATION) AS NEEDED
Status: CANCELLED
Start: 2018-12-07

## 2018-11-21 RX ORDER — HYDROCORTISONE SODIUM SUCCINATE 100 MG/2ML
100 INJECTION, POWDER, FOR SOLUTION INTRAMUSCULAR; INTRAVENOUS AS NEEDED
Status: CANCELLED | OUTPATIENT
Start: 2018-12-07

## 2018-11-21 RX ORDER — HYDROCORTISONE SODIUM SUCCINATE 100 MG/2ML
100 INJECTION, POWDER, FOR SOLUTION INTRAMUSCULAR; INTRAVENOUS AS NEEDED
Status: CANCELLED | OUTPATIENT
Start: 2018-11-28

## 2018-11-21 NOTE — PROGRESS NOTES
[facility] [place] [address] [address], [address] W: [phone]  F: [phone] for Visit:  
Abelardo Singh is a 26 y.o. female who is seen in consultation at the request of Dr. Rivas Gandara for evaluation of anemia History of Present Illness:  
Patient is a 26 y.o. female with PMH as below who was seen for evaluation of anemia. She had gastric bypass surgery in . She was noted to have a hemoglobin of 10.6 g/dL with an MCV of 83 on 10/31/18. Workup revealed iron deficiency with an iron saturation of 5% and ferritin of 9%. B12 normal at 438. TSH was normal.  Now comes to discuss further management. In 2017 she was severely anemic at 6.8 g/dl and needed a transfusion. She states that she has noted difficulty focusing , skin dryness, fatigue, no LAKHANI. She has had no dizziness. She has some insomnia. She has no craving for ice. She has no melena, hematochezia. No hematuria. She has menorrhagia. She changes a pad every hour , no clots. She has not seen a gynecologist. She is not on birth control. She has no falls or headaches. She is intolerant to IV Iron. She has no FH of anemia Never smoker. Has 3 children. Past Medical History:  
Diagnosis Date  Nausea & vomiting  Transient elevated blood pressure Past Surgical History:  
Procedure Laterality Date  HX BREAST BIOPSY  HX  SECTION  9/10  
 HX DILATION AND CURETTAGE    
 HX OTHER SURGICAL    
 gastric banding. Social History Tobacco Use  Smoking status: Former Smoker Years: 6.00 Last attempt to quit: 2013 Years since quittin.7  Smokeless tobacco: Never Used Substance Use Topics  Alcohol use: Yes Alcohol/week: 0.0 oz  
  Comment: occassionally Family History Problem Relation Age of Onset  Hypertension Mother  Diabetes Mother  Hypertension Father  Diabetes Father  Cancer Father   
     lung  Hypertension Brother  Other Brother  Hypertension Sister Current Outpatient Medications Medication Sig  MELATONIN PO Take  by mouth nightly as needed.  cyclobenzaprine (FLEXERIL) 5 mg tablet 1 tab by mouth every 8 hrs, Take first dose at night to see if it makes you sleepy if tolerated take every 8 hours as needed for back spasms  multivitamin (ONE A DAY) tablet Take 1 Tab by mouth daily.  ferrous sulfate 325 mg (65 mg iron) tablet Take 1 Tab by mouth two (2) times a day.  ergocalciferol (VITAMIN D2) 50,000 unit capsule Take 50,000 Units by mouth every seven (7) days.  ascorbic acid, vitamin C, (VITAMIN C) 500 mg chew Take 500 mg by mouth daily.  cyanocobalamin 1,000 mcg tablet Take 1 Tab by mouth daily. No current facility-administered medications for this visit. Allergies Allergen Reactions  Iodine And Iodide Containing Products Hives  Shrimp Hives Review of Systems: A complete review of systems was obtained, negative except as described above. Physical Exam:  
 
Visit Vitals /83 (BP 1 Location: Right arm, BP Patient Position: Sitting) Pulse 75 Temp 98.7 °F (37.1 °C) (Oral) Resp 20 Ht 5' 3\" (1.6 m) Wt 186 lb 3.2 oz (84.5 kg) LMP 11/16/2018 SpO2 98% BMI 32.98 kg/m² ECOG PS:1 General: No distress Eyes: PERRLA, anicteric sclerae, PALLOR HENT: Atraumatic, OP clear Neck: Supple Lymphatic: No cervical, supraclavicular, or inguinal adenopathy Respiratory: CTAB, normal respiratory effort CV: Normal rate, regular rhythm, no murmurs, no peripheral edema GI: Soft, nontender, nondistended, no masses, no hepatomegaly, no splenomegaly MS: Normal gait and station. Digits without clubbing or cyanosis. Skin: No rashes, ecchymoses, or petechiae. Normal temperature, turgor, and texture. Psych: Alert, oriented, appropriate affect, normal judgment/insight Results:  
 
Lab Results Component Value Date/Time WBC 4.9 10/31/2018 03:46 PM  
 HGB 10.6 (L) 10/31/2018 03:46 PM  
 HCT 34.1 10/31/2018 03:46 PM  
 PLATELET 681 84/16/4695 03:46 PM  
 MCV 83 10/31/2018 03:46 PM  
 ABS. NEUTROPHILS 2.7 10/31/2018 03:46 PM  
 
Lab Results Component Value Date/Time Sodium 140 10/31/2018 03:46 PM  
 Potassium 4.2 10/31/2018 03:46 PM  
 Chloride 102 10/31/2018 03:46 PM  
 CO2 26 10/31/2018 03:46 PM  
 Glucose 78 10/31/2018 03:46 PM  
 BUN 6 10/31/2018 03:46 PM  
 Creatinine 0.51 (L) 10/31/2018 03:46 PM  
 GFR est  10/31/2018 03:46 PM  
 GFR est non- 10/31/2018 03:46 PM  
 Calcium 9.0 10/31/2018 03:46 PM  
 Glucose (POC) 131 (H) 08/03/2013 05:01 PM  
 
Lab Results Component Value Date/Time Bilirubin, total <0.2 10/31/2018 03:46 PM  
 ALT (SGPT) 12 10/31/2018 03:46 PM  
 AST (SGOT) 15 10/31/2018 03:46 PM  
 Alk. phosphatase 65 10/31/2018 03:46 PM  
 Protein, total 7.3 10/31/2018 03:46 PM  
 Albumin 4.3 10/31/2018 03:46 PM  
 Globulin 4.1 (H) 11/19/2017 03:51 PM  
 
Lab Results Component Value Date/Time Reticulocyte count 0.7 08/18/2017 06:25 AM  
 Iron % saturation 5 (LL) 10/31/2018 03:46 PM  
 TIBC 386 10/31/2018 03:46 PM  
 Ferritin 9 (L) 10/31/2018 03:46 PM  
 Vitamin B12 438 10/31/2018 03:46 PM  
 Folate 8.0 08/18/2017 06:25 AM  
 Haptoglobin 65 08/18/2017 06:25 AM  
 CYDNEY Poly NEG 08/17/2017 05:31 PM  
  08/18/2017 06:25 AM  
 Sed rate (ESR) 6 09/18/2012 08:56 AM  
 TSH 1.390 10/31/2018 03:46 PM  
 M-Sharad Not Observed 08/18/2017 12:53 PM  
 ELIZABET, Direct NEGATIVE  08/18/2017 06:25 AM  
 Lipase 139 08/17/2017 05:31 PM  
 
No results found for: INR, APTT, DDIMSQ, DDIME, [id number], Niall Morejon, Negin Mew, [address] [facility], [address], [id number], [id number], [id number], [id number], [id number], [id number] Records reviewed and summarized above. Pathology report(s) reviewed above. Radiology report(s) reviewed above. Assessment:  
1) Iron deficiency anemia Labs reviewed. She has severe iron deficiency secondary to blood loss from menorrhagia. Also has poor absorption from prior gastric bypass. Will replace with IV Injectafer x 2 and r/o other causes 2) Menorrhagia She was encouraged to see Gynecology 3) Obesity 4) Psychosocial 
Coping well Plan:  
 
· Injectafer x 2 
· RTC 3 months cbc, iron studies, B12, Haptoglobin and smear I appreciate the opportunity to participate in Ms. Zarate care.  
 
Signed By: Antoni Christensen MD

## 2018-11-21 NOTE — TELEPHONE ENCOUNTER
Informed patient per provider result note. Follow up with hematologist to discuss treatment options. Pt verbalized understanding.

## 2018-11-21 NOTE — PROGRESS NOTES
Carey Klein is a 26 y.o. female here today as a new patient, anemia. Has been unable to tolerate oral iron supplementation for treatment of anemia, has had nausea ans stomach cramping/pain.

## 2018-11-29 ENCOUNTER — HOSPITAL ENCOUNTER (OUTPATIENT)
Dept: INFUSION THERAPY | Age: 33
Discharge: HOME | End: 2018-11-29
Payer: COMMERCIAL

## 2018-11-29 VITALS
TEMPERATURE: 97.7 F | SYSTOLIC BLOOD PRESSURE: 131 MMHG | RESPIRATION RATE: 18 BRPM | HEART RATE: 72 BPM | DIASTOLIC BLOOD PRESSURE: 82 MMHG

## 2018-11-29 DIAGNOSIS — D50.9 IRON DEFICIENCY ANEMIA, UNSPECIFIED IRON DEFICIENCY ANEMIA TYPE: Primary | ICD-10-CM

## 2018-11-29 PROCEDURE — 74011250636 HC RX REV CODE- 250/636: Performed by: REGISTERED NURSE

## 2018-11-29 PROCEDURE — 96374 THER/PROPH/DIAG INJ IV PUSH: CPT

## 2018-11-29 PROCEDURE — 96360 HYDRATION IV INFUSION INIT: CPT

## 2018-11-29 RX ADMIN — SODIUM CHLORIDE 500 ML: 900 INJECTION, SOLUTION INTRAVENOUS at 15:30

## 2018-11-29 RX ADMIN — FERRIC CARBOXYMALTOSE INJECTION 750 MG: 50 INJECTION, SOLUTION INTRAVENOUS at 16:26

## 2018-11-29 NOTE — PROGRESS NOTES
Outpatient Infusion Center Short Visit Progress Note 1525 Pt admit to [facility] for [facility]jectafer/hydration ambulatory in stable condition. Assessment completed. No new concerns voiced. Patient Vitals for the past 12 hrs: 
 Temp Pulse Resp BP  
11/29/18 1525 97.7 °F (36.5 °C) 72 18 131/82 PIV with positive blood return, flushed, heparinized and de-accessed per protocol. Medications: 
Hydration Injectafer 1710 Pt tolerated treatment well. D/c home ambulatory in no distress. Patient stayed post infusion with no signs of reaction noted.

## 2018-12-06 ENCOUNTER — HOSPITAL ENCOUNTER (OUTPATIENT)
Dept: INFUSION THERAPY | Age: 33
End: 2018-12-06
Payer: COMMERCIAL

## 2018-12-07 ENCOUNTER — HOSPITAL ENCOUNTER (OUTPATIENT)
Dept: INFUSION THERAPY | Age: 33
Discharge: HOME | End: 2018-12-07
Payer: COMMERCIAL

## 2018-12-07 VITALS
TEMPERATURE: 98.3 F | DIASTOLIC BLOOD PRESSURE: 69 MMHG | SYSTOLIC BLOOD PRESSURE: 119 MMHG | HEART RATE: 79 BPM | RESPIRATION RATE: 18 BRPM

## 2018-12-07 DIAGNOSIS — D50.9 IRON DEFICIENCY ANEMIA, UNSPECIFIED IRON DEFICIENCY ANEMIA TYPE: Primary | ICD-10-CM

## 2018-12-07 PROCEDURE — 96365 THER/PROPH/DIAG IV INF INIT: CPT

## 2018-12-07 PROCEDURE — 74011250636 HC RX REV CODE- 250/636: Performed by: REGISTERED NURSE

## 2018-12-07 RX ORDER — HEPARIN 100 UNIT/ML
300-500 SYRINGE INTRAVENOUS AS NEEDED
Status: ACTIVE | OUTPATIENT
Start: 2018-12-07 | End: 2018-12-08

## 2018-12-07 RX ORDER — SODIUM CHLORIDE 9 MG/ML
10 INJECTION INTRAMUSCULAR; INTRAVENOUS; SUBCUTANEOUS AS NEEDED
Status: ACTIVE | OUTPATIENT
Start: 2018-12-07 | End: 2018-12-08

## 2018-12-07 RX ORDER — SODIUM CHLORIDE 9 MG/ML
500 INJECTION, SOLUTION INTRAVENOUS ONCE
Status: DISPENSED | OUTPATIENT
Start: 2018-12-07 | End: 2018-12-08

## 2018-12-07 RX ORDER — SODIUM CHLORIDE 0.9 % (FLUSH) 0.9 %
10 SYRINGE (ML) INJECTION AS NEEDED
Status: ACTIVE | OUTPATIENT
Start: 2018-12-07 | End: 2018-12-08

## 2018-12-07 RX ADMIN — Medication 10 ML: at 16:21

## 2018-12-07 RX ADMIN — FERRIC CARBOXYMALTOSE INJECTION 750 MG: 50 INJECTION, SOLUTION INTRAVENOUS at 16:00

## 2018-12-07 RX ADMIN — Medication 10 ML: at 16:22

## 2018-12-07 RX ADMIN — Medication 10 ML: at 15:20

## 2018-12-07 NOTE — PROGRESS NOTES
Outpatient Infusion Center Short Visit Progress Note 1505 Pt admit to [facility] for Injectafer 2/2 ambulatory in stable condition. Assessment completed. No new concerns voiced. Patient Vitals for the past 12 hrs: 
 Temp Pulse Resp BP  
12/07/18 1628 98.3 °F (36.8 °C) 79 18 119/69  
12/07/18 1507 99.2 °F (37.3 °C) 85 18 136/83 PIV with positive blood return, flushed, heparinized and de-accessed per protocol. Medications: 
Injectafer IV 
 
1630 Pt tolerated treatment well. D/c home ambulatory in no distress. Patient declined 30 minute observation.

## 2018-12-28 ENCOUNTER — OFFICE VISIT (OUTPATIENT)
Dept: FAMILY MEDICINE CLINIC | Age: 33
End: 2018-12-28

## 2018-12-28 VITALS
WEIGHT: 188.8 LBS | SYSTOLIC BLOOD PRESSURE: 131 MMHG | RESPIRATION RATE: 18 BRPM | DIASTOLIC BLOOD PRESSURE: 84 MMHG | TEMPERATURE: 98.9 F | HEIGHT: 63 IN | BODY MASS INDEX: 33.45 KG/M2 | HEART RATE: 78 BPM | OXYGEN SATURATION: 99 %

## 2018-12-28 DIAGNOSIS — R41.840 CONCENTRATION DEFICIT: Primary | ICD-10-CM

## 2018-12-28 NOTE — PATIENT INSTRUCTIONS
Attention Deficit Hyperactivity Disorder (ADHD) in Adults: Care Instructions  Your Care Instructions    Attention deficit hyperactivity disorder, or ADHD, is a condition that makes it hard to pay attention. So you may have problems when you try to focus, get organized, and finish tasks. It might make you more active than other people. Or you might do things without thinking first.  ADHD is very common. It usually starts in early childhood. Many adults don't realize they have it until their children are diagnosed. Then they become aware of their own symptoms. Doctors don't know what causes ADHD. But it often runs in families. ADHD can be treated with medicines, behavior training, and counseling. Treatment can improve your life. Follow-up care is a key part of your treatment and safety. Be sure to make and go to all appointments, and call your doctor if you are having problems. It's also a good idea to know your test results and keep a list of the medicines you take. How can you care for yourself at home? · Learn all you can about ADHD. This will help you and your family understand it better. · Take your medicines exactly as prescribed. Call your doctor if you think you are having a problem with your medicine. You will get more details on the specific medicines your doctor prescribes. · If you miss a dose of your medicine, do not take an extra dose. · If your doctor suggests counseling, find a counselor you like and trust. Talk openly and honestly. Be willing to make some changes. · Find a support group for adults with ADHD. Talking to others with the same problems can help you feel better. It can also give you ideas about how to best cope with the condition. · Get rid of distractions at your work space. Keep your desk clean. Try not to face a window or busy hallway. · Use files, planners, and other tools to keep you organized. · Limit use of alcohol, and do not use illegal drugs.  People with ADHD tend to become addicted more easily than others. Tell your doctor if you need help to quit. Counseling, support groups, and sometimes medicines can help you stay free of alcohol or drugs. · Get at least 30 minutes of physical activity on most days of the week. Exercise has been shown to help people cope with ADHD. Walking is a good choice. You also may want to do other activities, such as running, swimming, cycling, or playing tennis or team sports. When should you call for help? Watch closely for changes in your health, and be sure to contact your doctor if:    · You feel sad a lot or cry all the time.     · You have trouble sleeping, or you sleep too much.     · You find it hard to concentrate, make decisions, or remember things.     · You change how you normally eat.     · You feel guilty for no reason. Where can you learn more? Go to http://aysha-sherron.info/. Enter B196 in the search box to learn more about \"Attention Deficit Hyperactivity Disorder (ADHD) in Adults: Care Instructions. \"  Current as of: December 7, 2017  Content Version: 11.8  © 2400-5028 [employer]wise, [employer]. Care instructions adapted under license by [employer] (which disclaims liability or warranty for this information). If you have questions about a medical condition or this instruction, always ask your healthcare professional. [place] [address] any warranty or liability for your use of this information.

## 2018-12-28 NOTE — PROGRESS NOTES
Yue Camacho is a 26 y.o. female    Patient states she has a difficult time staying focus and trouble concentratng  Which is a chronic problem but is getting worst.  Patient also mention she has a bump on her left index finger that has been there for x 7 days  Chief Complaint   Patient presents with    Follow-up     trouble concentrating and focusing     Cyst     left index finger x 7 days        1. Have ygto the ER, urgent care clinic since your last visit? Hospitalized since your last visit? No  M  2. Have you seen or consulted any other health care providers outside of the [address] [place] [address] since your last visit? Include any pap smears or colon screening. No      Visit Vitals  /84 (BP 1 Location: Left arm, BP Patient Position: Sitting)   Pulse 78   Temp 98.9 °F (37.2 °C) (Oral)   Resp 18   Ht 5' 3\" (1.6 m)   Wt 188 lb 12.8 oz (85.6 kg)   SpO2 99%   BMI 33.44 kg/m²           Health Maintenance Due   Topic Date Due    Influenza Age 5 to Adult  08/01/2018         Medication Reconciliation completed, changes noted.   Please  Update medication list.

## 2018-12-31 NOTE — PROGRESS NOTES
Chief Complaint   Patient presents with    Follow-up     trouble concentrating and focusing     Cyst     left index finger x 7 days      she is a 26y.o. year old female who presents for evalution. She is requesting to initiate ADHD medications here today at this walk in clinic setting. I have explained that she will require testing from psych, and follow up for medication initiation. She denies any depression or anxiety. Reviewed PmHx, RxHx, FmHx, SocHx, AllgHx and updated and dated in the chart. Review of Systems - negative except as listed above in the HPI    Objective:     Vitals:    12/28/18 1110   BP: 131/84   Pulse: 78   Resp: 18   Temp: 98.9 °F (37.2 °C)   TempSrc: Oral   SpO2: 99%   Weight: 188 lb 12.8 oz (85.6 kg)   Height: 5' 3\" (1.6 m)       No current outpatient medications on file. No current facility-administered medications for this visit. Physical Examination: General appearance - alert, well appearing, and in no distress  Mental status - alert, oriented to person, place, and time  Eyes - pupils equal and reactive, extraocular eye movements intact  Ears - bilateral TM's and external ear canals normal  Nose - normal and patent, no erythema, discharge or polyps  Mouth - mucous membranes moist, pharynx normal without lesions  Neck - supple, no significant adenopathy  Lymphatics - no palpable lymphadenopathy, no hepatosplenomegaly  Chest - clear to auscultation, no wheezes, rales or rhonchi, symmetric air entry  Heart - normal rate, regular rhythm, normal S1, S2, no murmurs, rubs, clicks or gallops  Abdomen - soft, nontender, nondistended, no masses or organomegaly  Skin - normal coloration and turgor, no rashes, no suspicious skin lesions noted. Small bump on left index finger, possible callus, no tenderness, erythema or drainage. Assessment/ Plan:   Diagnoses and all orders for this visit:    1.  Concentration deficit     psych referral declined, she will need to check her insurance to find out what options are available. Follow-up Disposition:  Return if symptoms worsen or fail to improve. I have discussed the diagnosis with the patient and the intended plan as seen in the above orders. The patient has received an after-visit summary and questions were answered concerning future plans. Pt conveyed understanding of plan.     Medication Side Effects and Warnings were discussed with patient      Avelino Wahl NP

## 2019-02-21 DIAGNOSIS — D64.9 ANEMIA, UNSPECIFIED TYPE: ICD-10-CM

## 2019-02-26 ENCOUNTER — OFFICE VISIT (OUTPATIENT)
Dept: INTERNAL MEDICINE CLINIC | Age: 34
End: 2019-02-26

## 2019-02-26 ENCOUNTER — OFFICE VISIT (OUTPATIENT)
Dept: ONCOLOGY | Age: 34
End: 2019-02-26

## 2019-02-26 VITALS
WEIGHT: 187.4 LBS | RESPIRATION RATE: 26 BRPM | OXYGEN SATURATION: 95 % | TEMPERATURE: 98.5 F | HEIGHT: 63 IN | HEART RATE: 95 BPM | DIASTOLIC BLOOD PRESSURE: 91 MMHG | SYSTOLIC BLOOD PRESSURE: 143 MMHG | BODY MASS INDEX: 33.2 KG/M2

## 2019-02-26 VITALS
HEART RATE: 82 BPM | DIASTOLIC BLOOD PRESSURE: 86 MMHG | OXYGEN SATURATION: 98 % | WEIGHT: 187.4 LBS | BODY MASS INDEX: 33.2 KG/M2 | RESPIRATION RATE: 16 BRPM | TEMPERATURE: 98 F | SYSTOLIC BLOOD PRESSURE: 144 MMHG | HEIGHT: 63 IN

## 2019-02-26 DIAGNOSIS — F17.200 SMOKING ADDICTION: ICD-10-CM

## 2019-02-26 DIAGNOSIS — R41.3 MEMORY DIFFICULTY: Primary | ICD-10-CM

## 2019-02-26 DIAGNOSIS — E66.09 CLASS 1 OBESITY DUE TO EXCESS CALORIES WITHOUT SERIOUS COMORBIDITY WITH BODY MASS INDEX (BMI) OF 32.0 TO 32.9 IN ADULT: ICD-10-CM

## 2019-02-26 DIAGNOSIS — D64.9 ANEMIA, UNSPECIFIED TYPE: Primary | ICD-10-CM

## 2019-02-26 DIAGNOSIS — D50.8 OTHER IRON DEFICIENCY ANEMIA: ICD-10-CM

## 2019-02-26 DIAGNOSIS — F32.89 OTHER DEPRESSION: ICD-10-CM

## 2019-02-26 DIAGNOSIS — R03.0 ELEVATED BP WITHOUT DIAGNOSIS OF HYPERTENSION: ICD-10-CM

## 2019-02-26 RX ORDER — ESCITALOPRAM OXALATE 10 MG/1
10 TABLET ORAL DAILY
Qty: 30 TAB | Refills: 1 | Status: SHIPPED | OUTPATIENT
Start: 2019-02-26 | End: 2020-01-03

## 2019-02-26 NOTE — PROGRESS NOTES
Chief Complaint Patient presents with  
 Other  
  unable to focus Patient states she is here for not feeling like herself. Patient states she is not able to focus and unable to keep things together with her children and her employment. Patient states she is trying to keep her emotions inside while at work but it's effecting her attention to detail. Patient states she has been feeling this way since her second round of iron infusions. Patient states she doesn't know what to do.

## 2019-02-26 NOTE — PROGRESS NOTES
[facility] at [place] [address] [address], [address] W: [phone]  F: [phone] for Visit:  
Carols Eduardo Archuleta is a 37 y.o. female who is seen for evaluation of anemia History of Present Illness:  
Patient is a 37 y.o. female with PMH as below who was seen for evaluation of anemia. She had gastric bypass surgery in . She was noted to have a hemoglobin of 10.6 g/dL with an MCV of 83 on 10/31/18. Workup revealed iron deficiency with an iron saturation of 5% and ferritin of 9%. B12 normal at 438. TSH was normal. In 2017 she was severely anemic at 6.8 g/dl and needed a transfusion. She received Injectafer x 2 - last on 19. Now comes for follow up. She is tearful as she has continued to have  difficulty focusing and this is affecting her job. She has no melena, hematochezia. No hematuria. She has menorrhagia. She is dal[place] she says. She has no FH of anemia Never smoker. Has 3 children. Past Medical History:  
Diagnosis Date  Nausea & vomiting  Transient elevated blood pressure Past Surgical History:  
Procedure Laterality Date  HX BREAST BIOPSY  HX  SECTION  9/10  
 HX DILATION AND CURETTAGE    
 HX OTHER SURGICAL    
 gastric banding. Social History Tobacco Use  Smoking status: Former Smoker Years: 6.00 Last attempt to quit: 2013 Years since quittin.0  Smokeless tobacco: Never Used Substance Use Topics  Alcohol use: Yes Alcohol/week: 0.0 oz  
  Comment: occassionally Family History Problem Relation Age of Onset  Hypertension Mother  Diabetes Mother  Hypertension Father  Diabetes Father  Cancer Father   
     lung  Hypertension Brother  Other Brother  Hypertension Sister Current Outpatient Medications Medication Sig  
 OTHER OTC sleep aid ( unsure of name) as needed No current facility-administered medications for this visit. Allergies Allergen Reactions  Seafood [Iodine And Iodide Containing Products] Hives  Seafood [Shrimp] Hives Review of Systems: A complete review of systems was obtained, negative except as described above. Physical Exam:  
 
Visit Vitals Ht 5' 3\" (1.6 m) Wt 187 lb 6.4 oz (85 kg) BMI 33.20 kg/m² ECOG PS:1 General: Tearful Eyes: PERRLA, anicteric sclerae, not pale HENT: Atraumatic, OP clear Neck: Supple MS: Normal gait and station. Digits without clubbing or cyanosis. Skin: No rashes, ecchymoses, or petechiae. Normal temperature, turgor, and texture. Psych: Alert, oriented, appropriate affect Results:  
 
Lab Results Component Value Date/Time WBC 4.9 10/31/2018 03:46 PM  
 HGB 10.6 (L) 10/31/2018 03:46 PM  
 HCT 34.1 10/31/2018 03:46 PM  
 PLATELET 948 51/78/3393 03:46 PM  
 MCV 83 10/31/2018 03:46 PM  
 ABS. NEUTROPHILS 2.7 10/31/2018 03:46 PM  
 
Lab Results Component Value Date/Time Sodium 140 10/31/2018 03:46 PM  
 Potassium 4.2 10/31/2018 03:46 PM  
 Chloride 102 10/31/2018 03:46 PM  
 CO2 26 10/31/2018 03:46 PM  
 Glucose 78 10/31/2018 03:46 PM  
 BUN 6 10/31/2018 03:46 PM  
 Creatinine 0.51 (L) 10/31/2018 03:46 PM  
 GFR est  10/31/2018 03:46 PM  
 GFR est non- 10/31/2018 03:46 PM  
 Calcium 9.0 10/31/2018 03:46 PM  
 Glucose (POC) 131 (H) 08/03/2013 05:01 PM  
 
Lab Results Component Value Date/Time Bilirubin, total <0.2 10/31/2018 03:46 PM  
 ALT (SGPT) 12 10/31/2018 03:46 PM  
 AST (SGOT) 15 10/31/2018 03:46 PM  
 Alk. phosphatase 65 10/31/2018 03:46 PM  
 Protein, total 7.3 10/31/2018 03:46 PM  
 Albumin 4.3 10/31/2018 03:46 PM  
 Globulin 4.1 (H) 11/19/2017 03:51 PM  
 
Lab Results Component Value Date/Time  Reticulocyte count 0.7 08/18/2017 06:25 AM  
 Iron % saturation 5 (LL) 10/31/2018 03:46 PM  
 TIBC 386 10/31/2018 03:46 PM  
 Ferritin 9 (L) 10/31/2018 03:46 PM  
 Vitamin B12 438 10/31/2018 03:46 PM  
 Folate 8.0 08/18/2017 06:25 AM  
 Haptoglobin 65 08/18/2017 06:25 AM  
 CYDNEY Poly NEG 08/17/2017 05:31 PM  
  08/18/2017 06:25 AM  
 Sed rate (ESR) 6 09/18/2012 08:56 AM  
 TSH 1.390 10/31/2018 03:46 PM  
 M-Sharad Not Observed 08/18/2017 12:53 PM  
 ELIZABET, Direct NEGATIVE  08/18/2017 06:25 AM  
 Lipase 139 08/17/2017 05:31 PM  
 
No results found for: INR, APTT, DDIMSQ, DDIME, [id number], Henry Khloe, Lynnda Cluck, [address] [facility], [address], [id number], [id number], [id number], [id number], [id number], [id number] Records reviewed and summarized above. Pathology report(s) reviewed above. Radiology report(s) reviewed above. Assessment:  
1) Iron deficiency anemia Had severe iron deficiency secondary . Received IV Injectafer x 2- last on 12/7/19. She did not have labs done which she will get today I will review this to make sure her B12 is normal and that she is iron replete If anemia has resolved then we would need her to be evaluated for her current symptoms 2) Menorrhagia She was encouraged to see Gynecology 3) Obesity 4) Psychosocial 
Very tearful, cant focus Affecting her job Actively listened. Will assess B12 and Hb today If these are normal she would need to see her PCP for further evauation Plan:  
 
cbc, iron studies, B12, Haptoglobin and smear today- will review RTC 3 months with cbc and iron studies I appreciate the opportunity to participate in Ms. Zarate [facility].  
 
Signed By: Kenzie Seth MD

## 2019-02-26 NOTE — PROGRESS NOTES
Sindysharon  is a 37 y.o. female here today for follow up, anemia. 1. Have you been to the ER, urgent care clinic since your last visit? Hospitalized since your last visit? No  
 
2. Have you seen or consulted any other health care providers outside of the [address] [place] [address] since your last visit? Include any pap smears or colon screening.  No

## 2019-02-26 NOTE — PATIENT INSTRUCTIONS
Referral to Neuropsychiatry testing Refer to Therapy (provider list provided) Lexapro 10 mg daily  
follow up 1 month Call or return to clinic if these symptoms worsen or fail to improve as anticipated.

## 2019-02-26 NOTE — PROGRESS NOTES
HISTORY OF PRESENT ILLNESS Tiffanie Medina is a 37 y.o. female. HPI Patient presents with complains of trouble concentrating. She is followed by Anel East MD and has a history of iron deficiency requiring IV iron infusions in the past. She saw her hematologist this morning. Labs are currently pending. Patient complains of difficulty concentrating x 6 months. 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 She states that she had been able to handle this work load but is now having trouble completing work. She has been  taking over the counter sleep medication, and re started smoking currently about 6 cigarettes/day. She denies ETOH or other substance use. She complains of problems with focus, and  remembering tasks at work requiring her to make lists. She has been having trouble remembering dates, including her son's birthday. She is tearful throughout the visit. She admits to feeling overwhelmed and depressed. She denies suicidal thoughts. Past Medical History:  
Diagnosis Date  Nausea & vomiting  Transient elevated blood pressure Past Surgical History:  
Procedure Laterality Date  HX BREAST BIOPSY  HX  SECTION  9/10  
 HX DILATION AND CURETTAGE    
 HX OTHER SURGICAL    
 gastric banding. Current Outpatient Medications on File Prior to Visit Medication Sig Dispense Refill  OTHER OTC sleep aid ( unsure of name) as needed No current facility-administered medications on file prior to visit. All: seafood ROS Physical Exam 
Visit Vitals /86 (BP 1 Location: Left arm, BP Patient Position: Sitting) Pulse 82 Temp 98 °F (36.7 °C) (Oral) Resp 16 Ht 5' 3\" (1.6 m) Wt 187 lb 6.4 oz (85 kg) LMP 2019 SpO2 98% BMI 33.20 kg/m² Patient appears well Neck: supple, no adenopathy or thyromegaly Heart[de-identified] normal rate, regular rhythm, normal S1, S2, no murmurs, rubs, clicks or gallops. Chest: clear to auscultation, no wheezes, rales or rhonchi, PHQ 9 score: 19 Extremities: no edema ASSESSMENT and PLAN Depression Difficulty Concentrating Memory difficulty Symptoms likely related to depression. Patient is agreeable to starting medication treatment and seeing a therapist.  
Lexapro 10 mg every day # 30 follow up 1 month Referral to Therapist 
Referral for NeuroPsyc testing to further evaluate memory impairment Call or return to clinic prn if these symptoms worsen or fail to improve as anticipated. Fe deficiency: patient had labs this AM and is seeing Hematology Elevated BP: repeat at follow up. May need Rx if still elevated Urged smoking cessation Follow-up Disposition: 
Return if symptoms worsen or fail to improve. Advised to call back or return to office if symptoms worsen/change/persist. 
Discussed expected course/resolution/complications of diagnosis in detail with patient. Medication risks/benefits/costs/interactions/alternatives discussed with patient. She was given an after visit summary which includes diagnoses, current medications, & vitals. She expressed understanding with the diagnosis and plan.

## 2019-02-27 LAB
BASOPHILS # BLD AUTO: 0 X10E3/UL (ref 0–0.2)
BASOPHILS NFR BLD AUTO: 0 %
EOSINOPHIL # BLD AUTO: 0 X10E3/UL (ref 0–0.4)
EOSINOPHIL NFR BLD AUTO: 1 %
ERYTHROCYTE [DISTWIDTH] IN BLOOD BY AUTOMATED COUNT: 17.2 % (ref 12.3–15.4)
FERRITIN SERPL-MCNC: 178 NG/ML (ref 15–150)
HAPTOGLOB SERPL-MCNC: 66 MG/DL (ref 34–200)
HCT VFR BLD AUTO: 41.2 % (ref 34–46.6)
HGB BLD-MCNC: 13.9 G/DL (ref 11.1–15.9)
IMM GRANULOCYTES # BLD AUTO: 0 X10E3/UL (ref 0–0.1)
IMM GRANULOCYTES NFR BLD AUTO: 0 %
IRON SATN MFR SERPL: 37 % (ref 15–55)
IRON SERPL-MCNC: 100 UG/DL (ref 27–159)
LYMPHOCYTES # BLD AUTO: 1 X10E3/UL (ref 0.7–3.1)
LYMPHOCYTES NFR BLD AUTO: 18 %
MCH RBC QN AUTO: 30.7 PG (ref 26.6–33)
MCHC RBC AUTO-ENTMCNC: 33.7 G/DL (ref 31.5–35.7)
MCV RBC AUTO: 91 FL (ref 79–97)
MONOCYTES # BLD AUTO: 0.5 X10E3/UL (ref 0.1–0.9)
MONOCYTES NFR BLD AUTO: 8 %
NEUTROPHILS # BLD AUTO: 4.1 X10E3/UL (ref 1.4–7)
NEUTROPHILS NFR BLD AUTO: 73 %
PLATELET # BLD AUTO: 282 X10E3/UL (ref 150–379)
RBC # BLD AUTO: 4.53 X10E6/UL (ref 3.77–5.28)
TIBC SERPL-MCNC: 268 UG/DL (ref 250–450)
UIBC SERPL-MCNC: 168 UG/DL (ref 131–425)
VIT B12 SERPL-MCNC: 451 PG/ML (ref 232–1245)
WBC # BLD AUTO: 5.6 X10E3/UL (ref 3.4–10.8)

## 2019-03-04 LAB
BASOPHILS # BLD AUTO: 0 X10E3/UL (ref 0–0.2)
BASOPHILS NFR BLD AUTO: 0 %
EOSINOPHIL # BLD AUTO: 0.1 X10E3/UL (ref 0–0.4)
EOSINOPHIL NFR BLD AUTO: 1 %
ERYTHROCYTE [DISTWIDTH] IN BLOOD BY AUTOMATED COUNT: 17.5 % (ref 12.3–15.4)
HCT VFR BLD AUTO: 42.7 % (ref 34–46.6)
HGB BLD-MCNC: 13.8 G/DL (ref 11.1–15.9)
IMM GRANULOCYTES # BLD AUTO: 0 X10E3/UL (ref 0–0.1)
IMM GRANULOCYTES NFR BLD AUTO: 0 %
LYMPHOCYTES # BLD AUTO: 1 X10E3/UL (ref 0.7–3.1)
LYMPHOCYTES NFR BLD AUTO: 18 %
MCH RBC QN AUTO: 30.3 PG (ref 26.6–33)
MCHC RBC AUTO-ENTMCNC: 32.3 G/DL (ref 31.5–35.7)
MCV RBC AUTO: 94 FL (ref 79–97)
MONOCYTES # BLD AUTO: 0.5 X10E3/UL (ref 0.1–0.9)
MONOCYTES NFR BLD AUTO: 9 %
NEUTROPHILS # BLD AUTO: 4.1 X10E3/UL (ref 1.4–7)
NEUTROPHILS NFR BLD AUTO: 72 %
PATH REV BLD -IMP: ABNORMAL
PATHOLOGIST NAME: ABNORMAL
PLATELET # BLD AUTO: 280 X10E3/UL (ref 150–379)
RBC # BLD AUTO: 4.56 X10E6/UL (ref 3.77–5.28)
WBC # BLD AUTO: 5.8 X10E3/UL (ref 3.4–10.8)

## 2019-05-27 DIAGNOSIS — D64.9 ANEMIA, UNSPECIFIED TYPE: ICD-10-CM

## 2020-01-03 ENCOUNTER — OFFICE VISIT (OUTPATIENT)
Dept: PRIMARY CARE CLINIC | Age: 35
End: 2020-01-03

## 2020-01-03 VITALS
OXYGEN SATURATION: 99 % | HEART RATE: 69 BPM | RESPIRATION RATE: 17 BRPM | WEIGHT: 189 LBS | SYSTOLIC BLOOD PRESSURE: 118 MMHG | TEMPERATURE: 98.1 F | BODY MASS INDEX: 33.49 KG/M2 | DIASTOLIC BLOOD PRESSURE: 80 MMHG | HEIGHT: 63 IN

## 2020-01-03 DIAGNOSIS — F17.200 SMOKER: ICD-10-CM

## 2020-01-03 DIAGNOSIS — R41.840 SHORT ATTENTION SPAN: Primary | ICD-10-CM

## 2020-01-03 DIAGNOSIS — D50.8 OTHER IRON DEFICIENCY ANEMIA: ICD-10-CM

## 2020-01-03 RX ORDER — BUPROPION HYDROCHLORIDE 75 MG/1
TABLET ORAL
Qty: 60 TAB | Refills: 2 | Status: SHIPPED | OUTPATIENT
Start: 2020-01-03 | End: 2021-08-06

## 2020-01-03 NOTE — PROGRESS NOTES
Subjective:     Chief Complaint   Patient presents with    [facility]    Sleep Problem    Anemia    Breast Mass     lumps in breasts and back of left side of neck    Other     having issues staying focus        She  is a 38y.o. year old female who presents today as a new patient to [facility]. Her medical hx is significant for weight loss surgery,  iron deficiency anemia required iron IV infusion in the past. She is here to have a follow up on iron level as well with some concerns. PPatient complains of difficulty concentrating more than a year. She was evaluated by neuropsychologist in 3/2019 . Did show mild form of attention difficulty which was likely related to her social stressor and lack of sleep. 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 She has been experiencing difficulty completing work. She was prescribed lexapro for anxiety/depression but she did not think she needed that med so she did not take the med. Reports that due to her childrens schedule she cannot go to bed until 10:30-11:00 and wakes up at 4 am. Don't get enough sleep. Reports that she wakes up at 4 am to do exercise. She has been taking over the counter sleep medication. Smoking currently about 6 cigarettes/day. She denies suicidal thoughts. She felt swelling in her neck area. Wanted to check that out. Hx of breast lump followed by biopsy. She reports that her breast feels lumpy for years . She will follow up on this in next visit. A comprehensive review of systems was negative except for that written in the HPI.   Objective:     Vitals:    01/03/20 1030   BP: 118/80   Pulse: 69   Resp: 17   Temp: 98.1 °F (36.7 °C)   TempSrc: Oral   SpO2: 99%   Weight: 189 lb (85.7 kg)   Height: 5' 3\" (1.6 m)       Physical Examination: General appearance - alert, well appearing, and in no distress, oriented to person, place, and time and overweight  Mental status - alert, oriented to person, place, and time, normal mood, behavior, speech, dress, motor activity, and thought processes  Ears - bilateral TM's and external ear canals normal  Nose - normal and patent, no erythema, discharge or polyps  Mouth - mucous membranes moist, pharynx normal without lesions  Neck - supple, no significant adenopathy, thyroid exam: thyroid is normal in size without nodules or tenderness  Chest - clear to auscultation, no wheezes, rales or rhonchi, symmetric air entry  Heart - normal rate, regular rhythm, normal S1, S2, no murmurs, rubs, clicks or gallops  Abdomen - soft, nontender, nondistended, no masses or organomegaly  Neurological - alert, oriented, normal speech, no focal findings or movement disorder noted  Musculoskeletal - no joint tenderness, deformity or swelling    Allergies   Allergen Reactions    Seafood [Iodine And Iodide Containing Products] Hives    Seafood [Shrimp] Hives      Social History     Socioeconomic History    Marital status:      Spouse name: Not on file    Number of children: Not on file    Years of education: Not on file    Highest education level: Not on file   Tobacco Use    Smoking status: Light Tobacco Smoker     Years: 6.00    Smokeless tobacco: Never Used   Substance and Sexual Activity    Alcohol use: Yes     Alcohol/week: 0.0 standard drinks     Comment: occassionally    Drug use: Never    Sexual activity: Yes     Partners: Male      Family History   Problem Relation Age of Onset    Hypertension Mother     Diabetes Mother     Hypertension Father     Diabetes Father     Cancer Father         lung    Hypertension Brother     Other Brother     Hypertension Sister       Past Surgical History:   Procedure Laterality Date    HX BREAST BIOPSY      HX  SECTION  9/10    HX DILATION AND CURETTAGE      HX OTHER SURGICAL      gastric banding.        Past Medical History:   Diagnosis Date  Anemia     Nausea & vomiting     Transient elevated blood pressure       Current Outpatient Medications   Medication Sig Dispense Refill    doxylamine succinate (SLEEP AID, DOXYLAMINE, PO) Take  by mouth.  OTHER OTC sleep aid ( unsure of name) as needed      escitalopram oxalate (LEXAPRO) 10 mg tablet Take 1 Tab by mouth daily. 30 Tab 1        Assessment/ Plan:   Diagnoses and all orders for this visit:    1. Short attention span  -    Psychological eval reviewed. After discussing alternative we have agreed on starting  buPROPion [facility]) 75 mg tablet; Take one tab daily for the first week and then BID afterwards. Follow up in 2 months. 2. Other iron deficiency anemia  -     CBC WITH AUTOMATED DIFF  -     IRON PROFILE    3. Smoker  -    Start  buPROPion (WELLBUTRIN) 75 mg tablet; Take one tab daily for the first week and then BID afterwards. Medication risks/benefits/costs/interactions/alternatives discussed with patient. Advised patient to call back or return to office if symptoms worsen/change/persist. If patient cannot reach us or should anything more severe/urgent arise he/she should proceed directly to the nearest emergency department. Discussed expected course/resolution/complications of diagnosis in detail with patient. Patient given a written after visit summary which includes her diagnoses, current medications and vitals. Patient expressed understanding with the diagnosis and plan. Follow-up and Dispositions    · Return in about 1 month (around 2/3/2020) for complete physical  and fasting blood work and breast exam...

## 2020-01-04 PROBLEM — R41.840 SHORT ATTENTION SPAN: Status: ACTIVE | Noted: 2020-01-04

## 2020-01-04 LAB
BASOPHILS # BLD AUTO: 0 X10E3/UL (ref 0–0.2)
BASOPHILS NFR BLD AUTO: 1 %
EOSINOPHIL # BLD AUTO: 0.1 X10E3/UL (ref 0–0.4)
EOSINOPHIL NFR BLD AUTO: 2 %
ERYTHROCYTE [DISTWIDTH] IN BLOOD BY AUTOMATED COUNT: 12.9 % (ref 12.3–15.4)
HCT VFR BLD AUTO: 37.5 % (ref 34–46.6)
HGB BLD-MCNC: 12.3 G/DL (ref 11.1–15.9)
IMM GRANULOCYTES # BLD AUTO: 0 X10E3/UL (ref 0–0.1)
IMM GRANULOCYTES NFR BLD AUTO: 0 %
IRON SATN MFR SERPL: 13 % (ref 15–55)
IRON SERPL-MCNC: 47 UG/DL (ref 27–159)
LYMPHOCYTES # BLD AUTO: 1.4 X10E3/UL (ref 0.7–3.1)
LYMPHOCYTES NFR BLD AUTO: 29 %
MCH RBC QN AUTO: 29.4 PG (ref 26.6–33)
MCHC RBC AUTO-ENTMCNC: 32.8 G/DL (ref 31.5–35.7)
MCV RBC AUTO: 90 FL (ref 79–97)
MONOCYTES # BLD AUTO: 0.8 X10E3/UL (ref 0.1–0.9)
MONOCYTES NFR BLD AUTO: 16 %
NEUTROPHILS # BLD AUTO: 2.5 X10E3/UL (ref 1.4–7)
NEUTROPHILS NFR BLD AUTO: 52 %
PLATELET # BLD AUTO: 348 X10E3/UL (ref 150–450)
RBC # BLD AUTO: 4.18 X10E6/UL (ref 3.77–5.28)
TIBC SERPL-MCNC: 351 UG/DL (ref 250–450)
UIBC SERPL-MCNC: 304 UG/DL (ref 131–425)
WBC # BLD AUTO: 4.9 X10E3/UL (ref 3.4–10.8)

## 2020-01-06 NOTE — PROGRESS NOTES
Sent via my chart. Good Morning! Hope you are doing well. Your hemoglobin level is in normal range but iron level is mildly low. Please start taking over the counter iron supplement at least once/day. Thanks.     Dr. La Ramos

## 2020-01-29 ENCOUNTER — HOSPITAL ENCOUNTER (OUTPATIENT)
Dept: ULTRASOUND IMAGING | Age: 35
Discharge: HOME | End: 2020-01-29
Attending: FAMILY MEDICINE
Payer: COMMERCIAL

## 2020-01-29 ENCOUNTER — OFFICE VISIT (OUTPATIENT)
Dept: PRIMARY CARE CLINIC | Age: 35
End: 2020-01-29

## 2020-01-29 ENCOUNTER — TELEPHONE (OUTPATIENT)
Dept: PRIMARY CARE CLINIC | Age: 35
End: 2020-01-29

## 2020-01-29 ENCOUNTER — HOSPITAL ENCOUNTER (OUTPATIENT)
Dept: LAB | Age: 35
Discharge: HOME | End: 2020-01-29
Payer: COMMERCIAL

## 2020-01-29 ENCOUNTER — HOSPITAL ENCOUNTER (OUTPATIENT)
Dept: MAMMOGRAPHY | Age: 35
Discharge: HOME | End: 2020-01-29
Attending: FAMILY MEDICINE
Payer: COMMERCIAL

## 2020-01-29 VITALS
HEART RATE: 67 BPM | TEMPERATURE: 97.9 F | RESPIRATION RATE: 16 BRPM | OXYGEN SATURATION: 99 % | SYSTOLIC BLOOD PRESSURE: 115 MMHG | WEIGHT: 176.8 LBS | DIASTOLIC BLOOD PRESSURE: 70 MMHG | HEIGHT: 63 IN | BODY MASS INDEX: 31.33 KG/M2

## 2020-01-29 DIAGNOSIS — N63.0 LARGE MASS OF BREAST: ICD-10-CM

## 2020-01-29 DIAGNOSIS — Z01.419 WOMEN'S ANNUAL ROUTINE GYNECOLOGICAL EXAMINATION: Primary | ICD-10-CM

## 2020-01-29 DIAGNOSIS — N63.20 LEFT BREAST MASS: ICD-10-CM

## 2020-01-29 DIAGNOSIS — R41.840 SHORT ATTENTION SPAN: ICD-10-CM

## 2020-01-29 PROCEDURE — 87491 CHLMYD TRACH DNA AMP PROBE: CPT

## 2020-01-29 PROCEDURE — 87624 HPV HI-RISK TYP POOLED RSLT: CPT

## 2020-01-29 PROCEDURE — 77066 DX MAMMO INCL CAD BI: CPT

## 2020-01-29 PROCEDURE — 88175 CYTOPATH C/V AUTO FLUID REDO: CPT

## 2020-01-29 PROCEDURE — 76642 ULTRASOUND BREAST LIMITED: CPT

## 2020-01-29 NOTE — PROGRESS NOTES
Subjective:   38 y.o. female for Well Woman Check. Reports that Wellbutrin has been working with attention. She is able to finish her task. Its not helping with quitting smoking she says. She is here with a concern about BL breast mass noticed about a year ago. Reports that its been getting bigger. She had negative biopsy of the left breast in the past.   Denies any pain but tender to palpation. No family hx of breast cancer other than grandma but not sure what age she had the cancer. Patient's last menstrual period was 2020 (exact date). Social History: single partner, contraception - none. Pertinent past medical hstory: no history of HTN, DVT, CAD, DM, liver disease, migraines. Patient Active Problem List   Diagnosis Code    S/P gastric bypass Z98.84    Adjustment insomnia F51.02    Anemia D64.9    Masses of both breasts N63.10, N63.20    Class 1 obesity due to excess calories without serious comorbidity with body mass index (BMI) of 32.0 to 32.9 in adult E66.09, Z68.32    Short attention span R41.840     Current Outpatient Medications   Medication Sig Dispense Refill    doxylamine succinate (SLEEP AID, DOXYLAMINE, PO) Take  by mouth.  buPROPion (WELLBUTRIN) 75 mg tablet Take one tab daily for the first week and then BID afterwards. 60 Tab 2     Allergies   Allergen Reactions    Seafood [Iodine And Iodide Containing Products] Hives    Seafood [Shrimp] Hives     Past Medical History:   Diagnosis Date    Anemia     Nausea & vomiting     Transient elevated blood pressure      Past Surgical History:   Procedure Laterality Date    ABDOMEN SURGERY PROC UNLISTED      HX BREAST BIOPSY Bilateral     not sure of dates one 2009 not sure of other date    HX  SECTION  9/10    HX DILATION AND CURETTAGE      HX OTHER SURGICAL      gastric banding.       Family History   Problem Relation Age of Onset    Hypertension Mother     Diabetes Mother     Hypertension Father  Diabetes Father     Cancer Father         lung    Hypertension Brother     Other Brother     Hypertension Sister     Breast Cancer Paternal Grandmother         age unknown     Social History     Tobacco Use    Smoking status: Light Tobacco Smoker     Years: 6.00    Smokeless tobacco: Never Used   Substance Use Topics    Alcohol use: Yes     Alcohol/week: 0.0 standard drinks     Comment: occassionally        ROS:  Feeling well. No dyspnea or chest pain on exertion. No abdominal pain, change in bowel habits, black or bloody stools. No urinary tract symptoms. GYN ROS: normal menses, no abnormal bleeding, pelvic pain or discharge, she complains of breast mass. No neurological complaints. Objective:     Visit Vitals  Resp 16   Ht 5' 3\" (1.6 m)   Wt 176 lb 12.8 oz (80.2 kg)   LMP 01/19/2020 (Exact Date)   BMI 31.32 kg/m²     The patient appears well, alert, oriented x 3, in no distress. ENT normal.  Neck supple. No adenopathy or thyromegaly. MICHAEL. Lungs are clear, good air entry, no wheezes, rhonchi or rales. S1 and S2 normal, no murmurs, regular rate and rhythm. Abdomen soft without tenderness, guarding, mass or organomegaly. Extremities show no edema, normal peripheral pulses. Neurological is normal, no focal findings. BREAST EXAM: breasts appear normal,  no skin or nipple changes or axillary nodes. There a about 6-7 cm mobile, irregular shaped, hard mass noted in lower quad of the right breast. There is one about 2 cm hard, mobile, irregular mass noted in left breast in 2 o'clock position. PELVIC EXAM: normal external genitalia, vulva, vagina, cervix, uterus and adnexa, PAP: Pap smear done today, exam chaperoned by Salvador Paz. Assessment/Plan:   well woman  mammogram  pap smear  counseled on breast self exam and mammogram.   additional lab tests per orders  return annually or prn    ICD-10-CM ICD-9-CM    1.  Women's annual routine gynecological examination Z01.419 V72.31 PAP IG,CT-NG, APTIMA HPV AND RFX 16/18,45 ([id number])      LIPID PANEL      METABOLIC PANEL, COMPREHENSIVE      HEMOGLOBIN A1C WITH EAG      THYROID CASCADE PROFILE   2. Large mass of breast N63.0 611.72 US BREASTS COMPLETE  Mammogram diagnostic. 3. Short attention span R41.840 799.51      Diagnoses and all orders for this visit:    1. Women's annual routine gynecological examination  -     PAP IG,CT-NG, APTIMA HPV AND RFX 16/18,45 ([id number]); Future  -     LIPID PANEL  -     METABOLIC PANEL, COMPREHENSIVE  -     HEMOGLOBIN A1C WITH EAG  -     THYROID CASCADE PROFILE    2. Large mass of breast  -     US BREASTS COMPLETE; Future         -    Mammogram BL diagnostic. She will be going down stair to have the test done. 3. Short attention span      Well controlled with Wellbutrin. Follow-up and Dispositions    · Return in about 3 months (around 4/29/2020), or if symptoms worsen or fail to improve, for attention span. .       reviewed diet, exercise and weight control  reviewed medications and side effects in detail.

## 2020-01-29 NOTE — PROGRESS NOTES
Nickie Lange is a 38 y.o. female    Chief Complaint   Patient presents with    Complete Physical     with pap and breast exam, fasting       1. Have you been to the ER, urgent care clinic since your last visit? Hospitalized since your last visit? No    2. Have you seen or consulted any other health care providers outside of the [facility] since your last visit? Include any pap smears or colon screening. No    No flowsheet data found.      Health Maintenance Due   Topic Date Due    Pneumococcal 0-64 years (1 of 1 - PPSV23) 01/01/1989    PAP AKA CERVICAL CYTOLOGY  08/11/2019

## 2020-01-29 NOTE — TELEPHONE ENCOUNTER
0- Dr. Nuno Otero to US to discuss recommendation for 2 site ultrasound biopsy. Biopsy procedure explained to patient by Dr. Nuno Otero and patient wishes to schedule ASAP. Breast Biopsy Nurse Coordinator spoke with patient and scheduled her for this Friday January 31 at 0900 with arrival time of 0800. Order faxed to office of Dr. Sena Pisano for ultrasound guided left (9 o'clock) and right (6 o'clock) biopsy with post clip insertion. Patient given pre-biopsy information sheet and states that she has no further questions as she has had previous breast biopsies preformed. Spoke with Kyaw Bloom at [facility] and placed patient on schedule for this Friday January 31st at 0900/1000 for left and right ultrasound guided biopsy. AZUCENA to obtain authorization if needed.

## 2020-01-29 NOTE — TELEPHONE ENCOUNTER
a nurse named Bre Lima called from [facility] [place] wants to talk to Dr Monty Santos or her nurse about scheduling a biopsy. Also sent a fax relating to that. Requested a call back.  Call back number is [phone]

## 2020-01-30 ENCOUNTER — TELEPHONE (OUTPATIENT)
Dept: MAMMOGRAPHY | Age: 35
End: 2020-01-30

## 2020-01-30 ENCOUNTER — TELEPHONE (OUTPATIENT)
Dept: PRIMARY CARE CLINIC | Age: 35
End: 2020-01-30

## 2020-01-30 DIAGNOSIS — N63.0 LARGE MASS OF BREAST: Primary | ICD-10-CM

## 2020-01-30 DIAGNOSIS — R92.8 FOLLOW-UP EXAMINATION OF ABNORMAL MAMMOGRAM: ICD-10-CM

## 2020-01-30 DIAGNOSIS — N63.0 LUMP OR MASS IN BREAST: ICD-10-CM

## 2020-01-30 LAB
ALBUMIN SERPL-MCNC: 4 G/DL (ref 3.8–4.8)
ALBUMIN/GLOB SERPL: 1.5 {RATIO} (ref 1.2–2.2)
ALP SERPL-CCNC: 51 IU/L (ref 39–117)
ALT SERPL-CCNC: 21 IU/L (ref 0–32)
AST SERPL-CCNC: 30 IU/L (ref 0–40)
BILIRUB SERPL-MCNC: 0.4 MG/DL (ref 0–1.2)
BUN SERPL-MCNC: 10 MG/DL (ref 6–20)
BUN/CREAT SERPL: 14 (ref 9–23)
CALCIUM SERPL-MCNC: 8.8 MG/DL (ref 8.7–10.2)
CHLORIDE SERPL-SCNC: 103 MMOL/L (ref 96–106)
CHOLEST SERPL-MCNC: 172 MG/DL (ref 100–199)
CO2 SERPL-SCNC: 24 MMOL/L (ref 20–29)
CREAT SERPL-MCNC: 0.72 MG/DL (ref 0.57–1)
EST. AVERAGE GLUCOSE BLD GHB EST-MCNC: 105 MG/DL
GLOBULIN SER CALC-MCNC: 2.6 G/DL (ref 1.5–4.5)
GLUCOSE SERPL-MCNC: 84 MG/DL (ref 65–99)
HBA1C MFR BLD: 5.3 % (ref 4.8–5.6)
HDLC SERPL-MCNC: 77 MG/DL
LDLC SERPL CALC-MCNC: 85 MG/DL (ref 0–99)
POTASSIUM SERPL-SCNC: 3.8 MMOL/L (ref 3.5–5.2)
PROT SERPL-MCNC: 6.6 G/DL (ref 6–8.5)
SODIUM SERPL-SCNC: 138 MMOL/L (ref 134–144)
TRIGL SERPL-MCNC: 48 MG/DL (ref 0–149)
TSH SERPL DL<=0.005 MIU/L-ACNC: 0.81 UIU/ML (ref 0.45–4.5)
VLDLC SERPL CALC-MCNC: 10 MG/DL (ref 5–40)

## 2020-01-30 NOTE — TELEPHONE ENCOUNTER
Returned call to Dr. Sam Navarrete office and spoke with Dung Blake to inform Dr. Silvana Alberty needs to release the orders in the computer - order placed yesterday via coordination of care.

## 2020-01-30 NOTE — PROGRESS NOTES
Result sent through my chart. Torres Srini you are doing well. I have reviewed your lab results and all came back normal. Cholesterol level, kidney, liver, thyroid, blood sugar level came back normal.     Please let me know if you have any questions. Thanks .     Dr. Pankaj Villanueva

## 2020-01-30 NOTE — TELEPHONE ENCOUNTER
I have called our [address] [place] [address] team and spoken with Christian Cheng, because we do not ever release the imaging order. Once they are ready to have the imaging done that is when the facility they are at releases it and processes it. She is saying because a [address] [place] [address] person placed the order so the patient could be scheduled for the procedure it is not going to work and needs an original order from Dr. Kaden Cardenas. Advised Dr. Kaden Cardenas co-signed the original order so it should work. She states she does not see a cosign on it. Dr. Kaden Cardenas- Please place new order.

## 2020-01-30 NOTE — TELEPHONE ENCOUNTER
1/30/2020 @ 0913 a.m. - Called Dr. Bita Bonilla office and spoke with Krysta Pereira - informed Krysta Pereira pt was recommended a right and left U/S guided breast biopsy for masses per Dr. Nuno Otero post U/S on both breast 1/29/20. Pt requested to have procedures done on 1/31/2020 at 0900 here at [id number] [facility] U/S dept. Informed Krysta Pereira if Dr. Liss Chakraborty wanted to place orders in [facility] the Img codes are:    U/S right breast biopsy = IMG 6502  6 o'clock position      U/S left breast biopsy = IMG 6501  9 o'clock position      Diagnostic code for Masses:  N63.0    Krysta Pereira states she will send the message back to Dr. Liss Chakraborty.

## 2020-01-30 NOTE — TELEPHONE ENCOUNTER
Call from imaging. Patient scheduled for 9am tomorrow for R & L breast biopsies. Needs orders sent.  CC IDs: [id number] (L) & [id number] (R) Dx N63.0

## 2020-01-31 ENCOUNTER — HOSPITAL ENCOUNTER (OUTPATIENT)
Dept: MAMMOGRAPHY | Age: 35
Discharge: HOME | End: 2020-01-31
Attending: FAMILY MEDICINE
Payer: COMMERCIAL

## 2020-01-31 ENCOUNTER — HOSPITAL ENCOUNTER (OUTPATIENT)
Dept: ULTRASOUND IMAGING | Age: 35
Discharge: HOME | End: 2020-01-31
Attending: FAMILY MEDICINE
Payer: COMMERCIAL

## 2020-01-31 VITALS
BODY MASS INDEX: 31.01 KG/M2 | RESPIRATION RATE: 20 BRPM | HEART RATE: 68 BPM | HEIGHT: 63 IN | WEIGHT: 175 LBS | DIASTOLIC BLOOD PRESSURE: 78 MMHG | TEMPERATURE: 97.3 F | SYSTOLIC BLOOD PRESSURE: 142 MMHG | OXYGEN SATURATION: 100 %

## 2020-01-31 DIAGNOSIS — N63.0 LARGE MASS OF BREAST: ICD-10-CM

## 2020-01-31 DIAGNOSIS — R89.7 ABNORMAL BREAST BIOPSY: ICD-10-CM

## 2020-01-31 DIAGNOSIS — R92.8 FOLLOW-UP EXAMINATION OF ABNORMAL MAMMOGRAM: ICD-10-CM

## 2020-01-31 LAB
C TRACH RRNA SPEC QL NAA+PROBE: NEGATIVE
N GONORRHOEA RRNA SPEC QL NAA+PROBE: NEGATIVE
SPECIMEN SOURCE: NORMAL

## 2020-01-31 PROCEDURE — 88305 TISSUE EXAM BY PATHOLOGIST: CPT

## 2020-01-31 PROCEDURE — 77066 DX MAMMO INCL CAD BI: CPT

## 2020-01-31 PROCEDURE — 74011000250 HC RX REV CODE- 250: Performed by: RADIOLOGY

## 2020-01-31 PROCEDURE — 88341 IMHCHEM/IMCYTCHM EA ADD ANTB: CPT

## 2020-01-31 PROCEDURE — 19083 BX BREAST 1ST LESION US IMAG: CPT

## 2020-01-31 PROCEDURE — 88342 IMHCHEM/IMCYTCHM 1ST ANTB: CPT

## 2020-01-31 RX ORDER — LIDOCAINE HYDROCHLORIDE AND EPINEPHRINE 10; 10 MG/ML; UG/ML
10 INJECTION, SOLUTION INFILTRATION; PERINEURAL ONCE
Status: ACTIVE | OUTPATIENT
Start: 2020-01-31 | End: 2020-01-31

## 2020-01-31 RX ORDER — LIDOCAINE HYDROCHLORIDE AND EPINEPHRINE 10; 10 MG/ML; UG/ML
1.5 INJECTION, SOLUTION INFILTRATION; PERINEURAL ONCE
Status: COMPLETED | OUTPATIENT
Start: 2020-01-31 | End: 2020-01-31

## 2020-01-31 RX ORDER — LIDOCAINE HYDROCHLORIDE 10 MG/ML
5 INJECTION, SOLUTION EPIDURAL; INFILTRATION; INTRACAUDAL; PERINEURAL
Status: COMPLETED | OUTPATIENT
Start: 2020-01-31 | End: 2020-01-31

## 2020-01-31 RX ADMIN — LIDOCAINE HYDROCHLORIDE,EPINEPHRINE BITARTRATE 15 MG: 10; .01 INJECTION, SOLUTION INFILTRATION; PERINEURAL at 09:00

## 2020-01-31 RX ADMIN — LIDOCAINE HYDROCHLORIDE 5 ML: 10 INJECTION, SOLUTION EPIDURAL; INFILTRATION; INTRACAUDAL; PERINEURAL at 11:00

## 2020-01-31 NOTE — DISCHARGE INSTRUCTIONS
[address]  [phone]      Breast Biopsy Discharge Instructions          1. After the biopsy, we will place a clean covered ice pack over the biopsy site, within the bra - you should leave the ice pack on 30 minutes and then remove the ice pack for 1-2 hours until bedtime. If needed you can continue applying ice the following day. It is a good idea to wear your bra for support, both day and night unless this causes you discomfort. 2. You may take Tylenol (two tablets) every 4 to 6 hours as needed for pain. Do not take aspirin or aspirin products (e.g. ibuprofen, Advil, Motrin) as these may cause more bleeding. 3. You may expect some bruising and skin discoloration in the biopsy area. This is normal and generally should resolve in 5 to 7 days. 4. Most women do not find it necessary to restrict their activities after the procedure. You should rest as needed on the day of your biopsy. The next day, if you are feeling okay, you may resume your regular work/activity schedule. Avoid strenuous activity and heavy lifting, jogging, aerobics, or vacuuming for 48 hours after the procedure. 5. 48 hours after your biopsy, remove the large outer dressing and leave the steri-strips (tiny pieces of tape) in place. The steri-strips will usually fall off in a few days. You may shower 48 hours after your biopsy and you may get the steri-strips wet. If still present after 4 days, you may gently peel the strips off. Keep the area clean and dry and shower daily. 6. If you have bleeding from the incision area, hold firm pressure on the area for 20 minutes. This should control any slight oozing that might occur.   If you develop persistent bleeding or pain which does not respond to the above measures or if you develop a fever, excessive swelling, redness, heat or drainage, please call the Breast Health Navigator at [phone] during normal business hours (7 a.m. - 5 p.m.). After business hours, call [phone] and ask for the on-call Radiology Nurse to be paged, or your referring physician. 7. You will receive your biopsy results (pathology report) in 3-5 business days - the radiologist will review your results and you will receive a call from the radiology department and/or from your doctor.

## 2020-01-31 NOTE — ROUTINE PROCESS
Dr. Jovi Dempsey Present for pre procedure consult and consent - questions reviewed with understanding - consent signed. Discharge instructions reviewed w/pt verbalizing understanding, will review again at discharge.

## 2020-01-31 NOTE — ROUTINE PROCESS
Discharge instructions reviewed with understanding, questions reviewed, copy given to patient. Post procedure Mammo completed and reviewed MD, pt cleared for discharge. Biopsy sites clean and dry, hemostasis achieved bilaterally. Steri strips with DSD placed. Ice packs held by patient's bra. Pt verbalized she would like to be notified by phone of any results. Pt encouraged to call department if she has not received her results in 3-5 business days. Pt advised not answer cell phone call while driving. Specimens verified w/christina carrion prior to being carried to gross room by HEATHER

## 2020-02-06 ENCOUNTER — TELEPHONE (OUTPATIENT)
Dept: MAMMOGRAPHY | Age: 35
End: 2020-02-06

## 2020-02-06 NOTE — TELEPHONE ENCOUNTER
Attempted to call pt. Regarding her breast biopsy results - called cell phone and work phone - no answer and left message for pt. To return a call to [phone] (no results left on message).

## 2020-02-06 NOTE — TELEPHONE ENCOUNTER
Dr. Copeland Mass reviewed pathology result and recommended her to get her yearly mammograms starting at age 36 unless she has any problems or concerns earlier. The pt. Was notified of the benign results and recommendations for a follow up mammogram at age 34 unless she needs to earlier. Advised pt to follow up with her doctor for any changes.

## 2020-03-05 ENCOUNTER — OFFICE VISIT (OUTPATIENT)
Dept: PRIMARY CARE CLINIC | Age: 35
End: 2020-03-05

## 2020-03-05 VITALS
TEMPERATURE: 98.3 F | WEIGHT: 175 LBS | HEART RATE: 71 BPM | RESPIRATION RATE: 17 BRPM | OXYGEN SATURATION: 99 % | BODY MASS INDEX: 31.01 KG/M2 | HEIGHT: 63 IN | SYSTOLIC BLOOD PRESSURE: 119 MMHG | DIASTOLIC BLOOD PRESSURE: 71 MMHG

## 2020-03-05 DIAGNOSIS — M76.892 LEFT KNEE TENDONITIS: Primary | ICD-10-CM

## 2020-03-05 RX ORDER — NAPROXEN 500 MG/1
500 TABLET ORAL
Qty: 20 TAB | Refills: 0 | Status: SHIPPED | OUTPATIENT
Start: 2020-03-05 | End: 2021-08-06

## 2020-03-05 NOTE — PROGRESS NOTES
Subjective:     Chief Complaint   Patient presents with    Knee Pain     left knee pain, thinks it is related to workouts x 3 wks        She  is a 38y.o. year old female who presents today with a c/o left knee pain. Reports that she has been doing lot of cardio, treadmill and elliptical , squatting, but due to the left knee pain she stopped exercising. She believe the pain is related to exercise. Walking, going up and down triggers the pain but walking makes the pain worse. Pain mainly in the medial side of the knee. No swelling noted. Pertinent items are noted in HPI. Objective:     Vitals:    03/05/20 0819   BP: 119/71   Pulse: 71   Resp: 17   Temp: 98.3 °F (36.8 °C)   TempSrc: Oral   SpO2: 99%   Weight: 175 lb (79.4 kg)   Height: 5' 3\" (1.6 m)       Physical Examination: General appearance - alert, well appearing, and in no distress, oriented to person, place, and time and overweight  Mental status - alert, oriented to person, place, and time, normal mood, behavior, speech, dress, motor activity, and thought processes  Chest - clear to auscultation, no wheezes, rales or rhonchi, symmetric air entry  Heart - normal rate, regular rhythm, normal S1, S2, no murmurs, rubs, clicks or gallops  Musculoskeletal - left knee: no swelling, non tender. Flexion , extension, lateral, medial rotation is normal .   Extremities - no pedal edema noted    Allergies   Allergen Reactions    Seafood [Iodine And Iodide Containing Products] Hives    Seafood [Shrimp] Hives      Social History     Socioeconomic History    Marital status:      Spouse name: Not on file    Number of children: Not on file    Years of education: Not on file    Highest education level: Not on file   Tobacco Use    Smoking status: Light Tobacco Smoker     Years: 6.00    Smokeless tobacco: Never Used   Substance and Sexual Activity    Alcohol use:  Yes     Alcohol/week: 0.0 standard drinks     Comment: occassionally    Drug use: Never    Sexual activity: Yes     Partners: Male      Family History   Problem Relation Age of Onset    Hypertension Mother     Diabetes Mother     Hypertension Father     Diabetes Father     Cancer Father         lung    Hypertension Brother     Other Brother     Hypertension Sister     Breast Cancer Paternal Grandmother         age unknown      Past Surgical History:   Procedure Laterality Date    ABDOMEN SURGERY PROC UNLISTED      HX BREAST BIOPSY Bilateral     not sure of dates one 2009 not sure of other date    HX  SECTION  9/10    HX DILATION AND CURETTAGE      HX OTHER SURGICAL      gastric banding. Past Medical History:   Diagnosis Date    Anemia     Nausea & vomiting     Transient elevated blood pressure       Current Outpatient Medications   Medication Sig Dispense Refill    doxylamine succinate (SLEEP AID, DOXYLAMINE, PO) Take  by mouth.  buPROPion (WELLBUTRIN) 75 mg tablet Take one tab daily for the first week and then BID afterwards. (Patient taking differently: Take 75 mg by mouth two (2) times a day. Take one tab daily for the first week and then BID afterwards.) 60 Tab 2        Assessment/ Plan:   Diagnoses and all orders for this visit:    1. Left knee tendonitis  -    Ice the knee. Rest.   start naproxen (NAPROSYN) 500 mg tablet; Take 1 Tab by mouth two (2) times daily with food as needed for Pain. Medication risks/benefits/costs/interactions/alternatives discussed with patient. Advised patient to call back or return to office if symptoms worsen/change/persist. If patient cannot reach us or should anything more severe/urgent arise he/she should proceed directly to the nearest emergency department. Discussed expected course/resolution/complications of diagnosis in detail with patient. Patient given a written after visit summary which includes her diagnoses, current medications and vitals. Patient expressed understanding with the diagnosis and plan. Follow-up and Dispositions    · Return if symptoms worsen or fail to improve.

## 2020-04-13 ENCOUNTER — NURSE TRIAGE (OUTPATIENT)
Dept: OTHER | Facility: CLINIC | Age: 35
End: 2020-04-13

## 2020-04-13 NOTE — TELEPHONE ENCOUNTER
Reason for Disposition   [1] MILD difficulty breathing (e.g., minimal/no SOB at rest, SOB with walking, pulse <100) AND [2] NEW-onset or WORSE than normal    Answer Assessment - Initial Assessment Questions  1. RESPIRATORY STATUS: \"Describe your breathing? \" (e.g., wheezing, shortness of breath, unable to speak, severe coughing)       Short of breath at all times. Chest feels tight too. everytime she inhales, it does not feel \"right. \"  2. ONSET: \"When did this breathing problem begin? \"       Couple days (since Friday)  3. PATTERN \"Does the difficult breathing come and go, or has it been constant since it started? \"       At all time  4. SEVERITY: \"How bad is your breathing? \" (e.g., mild, moderate, severe)     - MILD: No SOB at rest, mild SOB with walking, speaks normally in sentences, can lay down, no retractions, pulse < 100.     - MODERATE: SOB at rest, SOB with minimal exertion and prefers to sit, cannot lie down flat, speaks in phrases, mild retractions, audible wheezing, pulse 100-120.     - SEVERE: Very SOB at rest, speaks in single words, struggling to breathe, sitting hunched forward, retractions, pulse > 120       Pt can speak in full sentences  5. RECURRENT SYMPTOM: \"Have you had difficulty breathing before? \" If so, ask: \"When was the last time? \" and \"What happened that time? \"       Never before  6. CARDIAC HISTORY: \"Do you have any history of heart disease? \" (e.g., heart attack, angina, bypass surgery, angioplasty)       no  7. LUNG HISTORY: \"Do you have any history of lung disease? \"  (e.g., pulmonary embolus, asthma, emphysema)      no  8. CAUSE: \"What do you think is causing the breathing problem? \"       unsure  9. OTHER SYMPTOMS: \"Do you have any other symptoms? (e.g., dizziness, runny nose, cough, chest pain, fever)      Fatigue, body aches, no fever  10. PREGNANCY: \"Is there any chance you are pregnant? \" \"When was your last menstrual period? \"        no  11.  TRAVEL: \"Have you traveled out of the country in the last month? \" (e.g., travel history, exposures)        no    Protocols used: BREATHING DIFFICULTY-ADULT-AH    Pt with symptoms as documented above. Pt informed of disposition. Pt educated on [employer], web site and/or [employer]. Care advice as documented.

## 2020-04-14 ENCOUNTER — TELEPHONE (OUTPATIENT)
Dept: PRIMARY CARE CLINIC | Age: 35
End: 2020-04-14

## 2020-04-14 DIAGNOSIS — Z20.822 SUSPECTED COVID-19 VIRUS INFECTION: Primary | ICD-10-CM

## 2020-04-14 RX ORDER — PROMETHAZINE HYDROCHLORIDE AND DEXTROMETHORPHAN HYDROBROMIDE 6.25; 15 MG/5ML; MG/5ML
5 SYRUP ORAL
Qty: 120 ML | Refills: 0 | Status: SHIPPED | OUTPATIENT
Start: 2020-04-14 | End: 2020-04-21

## 2020-04-14 RX ORDER — VARENICLINE TARTRATE 25 MG
KIT ORAL
Qty: 1 DOSE PACK | Refills: 0 | Status: SHIPPED | OUTPATIENT
Start: 2020-04-14 | End: 2021-08-06

## 2020-04-14 RX ORDER — ALBUTEROL SULFATE 90 UG/1
1 AEROSOL, METERED RESPIRATORY (INHALATION)
Qty: 1 INHALER | Refills: 1 | Status: SHIPPED | OUTPATIENT
Start: 2020-04-14 | End: 2022-07-22

## 2020-04-14 RX ORDER — AZITHROMYCIN 250 MG/1
TABLET, FILM COATED ORAL
Qty: 6 TAB | Refills: 0 | Status: ON HOLD | OUTPATIENT
Start: 2020-04-14 | End: 2020-08-19 | Stop reason: ALTCHOICE

## 2020-04-14 NOTE — TELEPHONE ENCOUNTER
Velasquez calling from [place] [id number] [phone]. He state that the patient called to check on 4 prescription that was supposed to be sent over to the pharmacy on yesterday. Z-pack, Promethazine, albuterol and chantiz.

## 2020-08-19 ENCOUNTER — APPOINTMENT (OUTPATIENT)
Dept: CT IMAGING | Age: 35
DRG: 329 | End: 2020-08-19
Attending: EMERGENCY MEDICINE
Payer: COMMERCIAL

## 2020-08-19 ENCOUNTER — ANESTHESIA (OUTPATIENT)
Dept: SURGERY | Age: 35
DRG: 329 | End: 2020-08-19
Payer: COMMERCIAL

## 2020-08-19 ENCOUNTER — HOSPITAL ENCOUNTER (INPATIENT)
Age: 35
LOS: 3 days | Discharge: HOME | DRG: 329 | End: 2020-08-22
Attending: EMERGENCY MEDICINE | Admitting: SURGERY
Payer: COMMERCIAL

## 2020-08-19 ENCOUNTER — APPOINTMENT (OUTPATIENT)
Dept: ULTRASOUND IMAGING | Age: 35
DRG: 329 | End: 2020-08-19
Attending: EMERGENCY MEDICINE
Payer: COMMERCIAL

## 2020-08-19 ENCOUNTER — ANESTHESIA EVENT (OUTPATIENT)
Dept: SURGERY | Age: 35
DRG: 329 | End: 2020-08-19
Payer: COMMERCIAL

## 2020-08-19 DIAGNOSIS — K45.8 INTERNAL HERNIA: ICD-10-CM

## 2020-08-19 DIAGNOSIS — R10.10 UPPER ABDOMINAL PAIN: Primary | ICD-10-CM

## 2020-08-19 DIAGNOSIS — R10.84 GENERALIZED ABDOMINAL PAIN: ICD-10-CM

## 2020-08-19 PROBLEM — R10.9 ABDOMINAL PAIN: Status: ACTIVE | Noted: 2020-08-19

## 2020-08-19 PROBLEM — R11.2 N&V (NAUSEA AND VOMITING): Status: ACTIVE | Noted: 2020-08-19

## 2020-08-19 LAB
ALBUMIN SERPL-MCNC: 4.2 G/DL (ref 3.5–5)
ALBUMIN/GLOB SERPL: 1 {RATIO} (ref 1.1–2.2)
ALP SERPL-CCNC: 70 U/L (ref 45–117)
ALT SERPL-CCNC: 33 U/L (ref 12–78)
ANION GAP SERPL CALC-SCNC: 8 MMOL/L (ref 5–15)
APPEARANCE UR: CLEAR
AST SERPL-CCNC: 28 U/L (ref 15–37)
ATRIAL RATE: 71 BPM
BACTERIA URNS QL MICRO: ABNORMAL /HPF
BASOPHILS # BLD: 0 K/UL (ref 0–0.1)
BASOPHILS NFR BLD: 1 % (ref 0–1)
BILIRUB SERPL-MCNC: 0.4 MG/DL (ref 0.2–1)
BILIRUB UR QL: NEGATIVE
BUN SERPL-MCNC: 13 MG/DL (ref 6–20)
BUN/CREAT SERPL: 18 (ref 12–20)
CALCIUM SERPL-MCNC: 9.7 MG/DL (ref 8.5–10.1)
CALCULATED P AXIS, ECG09: 67 DEGREES
CALCULATED R AXIS, ECG10: 37 DEGREES
CALCULATED T AXIS, ECG11: 40 DEGREES
CHLORIDE SERPL-SCNC: 104 MMOL/L (ref 97–108)
CO2 SERPL-SCNC: 24 MMOL/L (ref 21–32)
COLOR UR: ABNORMAL
COMMENT, HOLDF: NORMAL
CREAT SERPL-MCNC: 0.74 MG/DL (ref 0.55–1.02)
DIAGNOSIS, 93000: NORMAL
DIFFERENTIAL METHOD BLD: ABNORMAL
EOSINOPHIL # BLD: 0 K/UL (ref 0–0.4)
EOSINOPHIL NFR BLD: 0 % (ref 0–7)
EPITH CASTS URNS QL MICRO: ABNORMAL /LPF
ERYTHROCYTE [DISTWIDTH] IN BLOOD BY AUTOMATED COUNT: 14.9 % (ref 11.5–14.5)
GLOBULIN SER CALC-MCNC: 4.3 G/DL (ref 2–4)
GLUCOSE SERPL-MCNC: 96 MG/DL (ref 65–100)
GLUCOSE UR STRIP.AUTO-MCNC: NEGATIVE MG/DL
HCG UR QL: NEGATIVE
HCT VFR BLD AUTO: 37.1 % (ref 35–47)
HGB BLD-MCNC: 12.1 G/DL (ref 11.5–16)
HGB UR QL STRIP: NEGATIVE
IMM GRANULOCYTES # BLD AUTO: 0 K/UL (ref 0–0.04)
IMM GRANULOCYTES NFR BLD AUTO: 0 % (ref 0–0.5)
KETONES UR QL STRIP.AUTO: 40 MG/DL
LEUKOCYTE ESTERASE UR QL STRIP.AUTO: NEGATIVE
LIPASE SERPL-CCNC: 103 U/L (ref 73–393)
LYMPHOCYTES # BLD: 1 K/UL (ref 0.8–3.5)
LYMPHOCYTES NFR BLD: 14 % (ref 12–49)
MCH RBC QN AUTO: 26.8 PG (ref 26–34)
MCHC RBC AUTO-ENTMCNC: 32.6 G/DL (ref 30–36.5)
MCV RBC AUTO: 82.3 FL (ref 80–99)
MONOCYTES # BLD: 0.7 K/UL (ref 0–1)
MONOCYTES NFR BLD: 10 % (ref 5–13)
NEUTS SEG # BLD: 5.5 K/UL (ref 1.8–8)
NEUTS SEG NFR BLD: 75 % (ref 32–75)
NITRITE UR QL STRIP.AUTO: NEGATIVE
NRBC # BLD: 0 K/UL (ref 0–0.01)
NRBC BLD-RTO: 0 PER 100 WBC
P-R INTERVAL, ECG05: 160 MS
PH UR STRIP: >8.5 [PH] (ref 5–8)
PLATELET # BLD AUTO: 423 K/UL (ref 150–400)
PMV BLD AUTO: 10.6 FL (ref 8.9–12.9)
POTASSIUM SERPL-SCNC: 3.7 MMOL/L (ref 3.5–5.1)
PROT SERPL-MCNC: 8.5 G/DL (ref 6.4–8.2)
PROT UR STRIP-MCNC: NEGATIVE MG/DL
Q-T INTERVAL, ECG07: 422 MS
QRS DURATION, ECG06: 80 MS
QTC CALCULATION (BEZET), ECG08: 458 MS
RBC # BLD AUTO: 4.51 M/UL (ref 3.8–5.2)
RBC #/AREA URNS HPF: ABNORMAL /HPF (ref 0–5)
SAMPLES BEING HELD,HOLD: NORMAL
SODIUM SERPL-SCNC: 136 MMOL/L (ref 136–145)
SP GR UR REFRACTOMETRY: 1.02 (ref 1–1.03)
UR CULT HOLD, URHOLD: NORMAL
UROBILINOGEN UR QL STRIP.AUTO: 1 EU/DL (ref 0.2–1)
VENTRICULAR RATE, ECG03: 71 BPM
WBC # BLD AUTO: 7.3 K/UL (ref 3.6–11)
WBC URNS QL MICRO: ABNORMAL /HPF (ref 0–4)

## 2020-08-19 PROCEDURE — 74011250636 HC RX REV CODE- 250/636: Performed by: SURGERY

## 2020-08-19 PROCEDURE — 44050 REDUCE BOWEL OBSTRUCTION: CPT | Performed by: SURGERY

## 2020-08-19 PROCEDURE — 77030011640 HC PAD GRND REM COVD -A: Performed by: SURGERY

## 2020-08-19 PROCEDURE — 77030020829: Performed by: SURGERY

## 2020-08-19 PROCEDURE — 74011000250 HC RX REV CODE- 250: Performed by: SURGERY

## 2020-08-19 PROCEDURE — 76010000133 HC OR TIME 3 TO 3.5 HR: Performed by: SURGERY

## 2020-08-19 PROCEDURE — 81025 URINE PREGNANCY TEST: CPT

## 2020-08-19 PROCEDURE — 76060000037 HC ANESTHESIA 3 TO 3.5 HR: Performed by: SURGERY

## 2020-08-19 PROCEDURE — 77030020053 HC ELECTRD LAPSCP COVD -B: Performed by: SURGERY

## 2020-08-19 PROCEDURE — 65270000029 HC RM PRIVATE

## 2020-08-19 PROCEDURE — 77030008756 HC TU IRR SUC STRY -B: Performed by: SURGERY

## 2020-08-19 PROCEDURE — 77030040361 HC SLV COMPR DVT MDII -B: Performed by: SURGERY

## 2020-08-19 PROCEDURE — 77030002895 HC DEV VASC CLOSR COVD -B: Performed by: SURGERY

## 2020-08-19 PROCEDURE — 77030020263 HC SOL INJ SOD CL0.9% LFCR 1000ML: Performed by: SURGERY

## 2020-08-19 PROCEDURE — 88307 TISSUE EXAM BY PATHOLOGIST: CPT

## 2020-08-19 PROCEDURE — 74176 CT ABD & PELVIS W/O CONTRAST: CPT

## 2020-08-19 PROCEDURE — 77030012770 HC TRCR OPT FX AMR -B: Performed by: SURGERY

## 2020-08-19 PROCEDURE — 77030040922 HC BLNKT HYPOTHRM STRY -A

## 2020-08-19 PROCEDURE — 93005 ELECTROCARDIOGRAM TRACING: CPT

## 2020-08-19 PROCEDURE — 0DQV4ZZ REPAIR MESENTERY, PERCUTANEOUS ENDOSCOPIC APPROACH: ICD-10-PCS | Performed by: SURGERY

## 2020-08-19 PROCEDURE — 77030008462 HC STPLR SKN PROX J&J -A: Performed by: SURGERY

## 2020-08-19 PROCEDURE — 96376 TX/PRO/DX INJ SAME DRUG ADON: CPT

## 2020-08-19 PROCEDURE — 77030008684 HC TU ET CUF COVD -B: Performed by: ANESTHESIOLOGY

## 2020-08-19 PROCEDURE — 99283 EMERGENCY DEPT VISIT LOW MDM: CPT | Performed by: NURSE PRACTITIONER

## 2020-08-19 PROCEDURE — 77030026438 HC STYL ET INTUB CARD -A: Performed by: ANESTHESIOLOGY

## 2020-08-19 PROCEDURE — 74011250637 HC RX REV CODE- 250/637: Performed by: SURGERY

## 2020-08-19 PROCEDURE — 74011000250 HC RX REV CODE- 250: Performed by: NURSE ANESTHETIST, CERTIFIED REGISTERED

## 2020-08-19 PROCEDURE — 77030016151 HC PROTCTR LNS DFOG COVD -B: Performed by: SURGERY

## 2020-08-19 PROCEDURE — 77030009965 HC RELD STPLR ENDOS COVD -D: Performed by: SURGERY

## 2020-08-19 PROCEDURE — 77030002916 HC SUT ETHLN J&J -A: Performed by: SURGERY

## 2020-08-19 PROCEDURE — 77030040955 HC DSCTR SONICISION MEDT -H1: Performed by: SURGERY

## 2020-08-19 PROCEDURE — 77030008608 HC TRCR ENDOSC SMTH AMR -B: Performed by: SURGERY

## 2020-08-19 PROCEDURE — 77030002933 HC SUT MCRYL J&J -A: Performed by: SURGERY

## 2020-08-19 PROCEDURE — 85025 COMPLETE CBC W/AUTO DIFF WBC: CPT

## 2020-08-19 PROCEDURE — 96374 THER/PROPH/DIAG INJ IV PUSH: CPT

## 2020-08-19 PROCEDURE — C9113 INJ PANTOPRAZOLE SODIUM, VIA: HCPCS | Performed by: EMERGENCY MEDICINE

## 2020-08-19 PROCEDURE — 74011000258 HC RX REV CODE- 258: Performed by: SURGERY

## 2020-08-19 PROCEDURE — 81001 URINALYSIS AUTO W/SCOPE: CPT

## 2020-08-19 PROCEDURE — 0DB84ZZ EXCISION OF SMALL INTESTINE, PERCUTANEOUS ENDOSCOPIC APPROACH: ICD-10-PCS | Performed by: SURGERY

## 2020-08-19 PROCEDURE — 74011250636 HC RX REV CODE- 250/636: Performed by: EMERGENCY MEDICINE

## 2020-08-19 PROCEDURE — 74011000250 HC RX REV CODE- 250: Performed by: EMERGENCY MEDICINE

## 2020-08-19 PROCEDURE — 80053 COMPREHEN METABOLIC PANEL: CPT

## 2020-08-19 PROCEDURE — 76210000006 HC OR PH I REC 0.5 TO 1 HR: Performed by: SURGERY

## 2020-08-19 PROCEDURE — 77030002966 HC SUT PDS J&J -A: Performed by: SURGERY

## 2020-08-19 PROCEDURE — 44202 LAP ENTERECTOMY: CPT | Performed by: SURGERY

## 2020-08-19 PROCEDURE — 77030038157 HC DEV PWR CNTR DISP SIGNIA COVD -C: Performed by: SURGERY

## 2020-08-19 PROCEDURE — 77030013079 HC BLNKT BAIR HGGR 3M -A: Performed by: ANESTHESIOLOGY

## 2020-08-19 PROCEDURE — 99218 HC RM OBSERVATION: CPT

## 2020-08-19 PROCEDURE — 76705 ECHO EXAM OF ABDOMEN: CPT

## 2020-08-19 PROCEDURE — 96375 TX/PRO/DX INJ NEW DRUG ADDON: CPT

## 2020-08-19 PROCEDURE — 74011250636 HC RX REV CODE- 250/636: Performed by: ANESTHESIOLOGY

## 2020-08-19 PROCEDURE — 74011250636 HC RX REV CODE- 250/636: Performed by: INTERNAL MEDICINE

## 2020-08-19 PROCEDURE — 77030016154: Performed by: SURGERY

## 2020-08-19 PROCEDURE — 77030031139 HC SUT VCRL2 J&J -A: Performed by: SURGERY

## 2020-08-19 PROCEDURE — 77030019908 HC STETH ESOPH SIMS -A: Performed by: ANESTHESIOLOGY

## 2020-08-19 PROCEDURE — 77030037032 HC INSRT SCIS CLICKLLINE DISP STOR -B: Performed by: SURGERY

## 2020-08-19 PROCEDURE — 76010000173 HC OR TIME 3 TO 3.5 HR INTENSV-TIER 1: Performed by: SURGERY

## 2020-08-19 PROCEDURE — 77030040830 HC CATH URETH FOL MDII -A: Performed by: SURGERY

## 2020-08-19 PROCEDURE — 99284 EMERGENCY DEPT VISIT MOD MDM: CPT

## 2020-08-19 PROCEDURE — 74011250636 HC RX REV CODE- 250/636: Performed by: NURSE ANESTHETIST, CERTIFIED REGISTERED

## 2020-08-19 PROCEDURE — 83690 ASSAY OF LIPASE: CPT

## 2020-08-19 RX ORDER — CEFAZOLIN SODIUM/WATER 2 G/20 ML
2 SYRINGE (ML) INTRAVENOUS ONCE
Status: COMPLETED | OUTPATIENT
Start: 2020-08-19 | End: 2020-08-19

## 2020-08-19 RX ORDER — MIDAZOLAM HYDROCHLORIDE 1 MG/ML
.25-5 INJECTION, SOLUTION INTRAMUSCULAR; INTRAVENOUS
Status: CANCELLED | OUTPATIENT
Start: 2020-08-19 | End: 2020-08-19

## 2020-08-19 RX ORDER — PROPOFOL 10 MG/ML
INJECTION, EMULSION INTRAVENOUS AS NEEDED
Status: DISCONTINUED | OUTPATIENT
Start: 2020-08-19 | End: 2020-08-19 | Stop reason: HOSPADM

## 2020-08-19 RX ORDER — SODIUM CHLORIDE, SODIUM LACTATE, POTASSIUM CHLORIDE, CALCIUM CHLORIDE 600; 310; 30; 20 MG/100ML; MG/100ML; MG/100ML; MG/100ML
INJECTION, SOLUTION INTRAVENOUS
Status: DISCONTINUED | OUTPATIENT
Start: 2020-08-19 | End: 2020-08-19 | Stop reason: HOSPADM

## 2020-08-19 RX ORDER — KETOROLAC TROMETHAMINE 30 MG/ML
15 INJECTION, SOLUTION INTRAMUSCULAR; INTRAVENOUS EVERY 6 HOURS
Status: COMPLETED | OUTPATIENT
Start: 2020-08-20 | End: 2020-08-20

## 2020-08-19 RX ORDER — SODIUM CHLORIDE 9 MG/ML
50 INJECTION, SOLUTION INTRAVENOUS CONTINUOUS
Status: CANCELLED | OUTPATIENT
Start: 2020-08-19 | End: 2020-08-19

## 2020-08-19 RX ORDER — ENOXAPARIN SODIUM 100 MG/ML
40 INJECTION SUBCUTANEOUS EVERY 24 HOURS
Status: DISCONTINUED | OUTPATIENT
Start: 2020-08-20 | End: 2020-08-22 | Stop reason: HOSPADM

## 2020-08-19 RX ORDER — POLYETHYLENE GLYCOL 3350 17 G/17G
17 POWDER, FOR SOLUTION ORAL DAILY PRN
Status: DISCONTINUED | OUTPATIENT
Start: 2020-08-19 | End: 2020-08-19

## 2020-08-19 RX ORDER — MIDAZOLAM HYDROCHLORIDE 1 MG/ML
1 INJECTION, SOLUTION INTRAMUSCULAR; INTRAVENOUS AS NEEDED
Status: DISCONTINUED | OUTPATIENT
Start: 2020-08-19 | End: 2020-08-19 | Stop reason: HOSPADM

## 2020-08-19 RX ORDER — ACETAMINOPHEN 650 MG/1
650 SUPPOSITORY RECTAL
Status: DISCONTINUED | OUTPATIENT
Start: 2020-08-19 | End: 2020-08-19

## 2020-08-19 RX ORDER — DEXAMETHASONE SODIUM PHOSPHATE 4 MG/ML
INJECTION, SOLUTION INTRA-ARTICULAR; INTRALESIONAL; INTRAMUSCULAR; INTRAVENOUS; SOFT TISSUE AS NEEDED
Status: DISCONTINUED | OUTPATIENT
Start: 2020-08-19 | End: 2020-08-19 | Stop reason: HOSPADM

## 2020-08-19 RX ORDER — SODIUM CHLORIDE, SODIUM LACTATE, POTASSIUM CHLORIDE, CALCIUM CHLORIDE 600; 310; 30; 20 MG/100ML; MG/100ML; MG/100ML; MG/100ML
125 INJECTION, SOLUTION INTRAVENOUS CONTINUOUS
Status: DISCONTINUED | OUTPATIENT
Start: 2020-08-19 | End: 2020-08-19 | Stop reason: HOSPADM

## 2020-08-19 RX ORDER — ONDANSETRON 2 MG/ML
4 INJECTION INTRAMUSCULAR; INTRAVENOUS
Status: COMPLETED | OUTPATIENT
Start: 2020-08-19 | End: 2020-08-19

## 2020-08-19 RX ORDER — PANTOPRAZOLE SODIUM 40 MG/10ML
40 INJECTION, POWDER, LYOPHILIZED, FOR SOLUTION INTRAVENOUS
Status: DISCONTINUED | OUTPATIENT
Start: 2020-08-19 | End: 2020-08-19 | Stop reason: SDUPTHER

## 2020-08-19 RX ORDER — ONDANSETRON 2 MG/ML
4 INJECTION INTRAMUSCULAR; INTRAVENOUS
Status: DISCONTINUED | OUTPATIENT
Start: 2020-08-19 | End: 2020-08-22 | Stop reason: HOSPADM

## 2020-08-19 RX ORDER — SODIUM CHLORIDE 0.9 % (FLUSH) 0.9 %
5-40 SYRINGE (ML) INJECTION AS NEEDED
Status: DISCONTINUED | OUTPATIENT
Start: 2020-08-19 | End: 2020-08-19 | Stop reason: HOSPADM

## 2020-08-19 RX ORDER — HYDROMORPHONE HYDROCHLORIDE 2 MG/ML
1 INJECTION, SOLUTION INTRAMUSCULAR; INTRAVENOUS; SUBCUTANEOUS ONCE
Status: COMPLETED | OUTPATIENT
Start: 2020-08-19 | End: 2020-08-19

## 2020-08-19 RX ORDER — ROCURONIUM BROMIDE 10 MG/ML
INJECTION, SOLUTION INTRAVENOUS AS NEEDED
Status: DISCONTINUED | OUTPATIENT
Start: 2020-08-19 | End: 2020-08-19 | Stop reason: HOSPADM

## 2020-08-19 RX ORDER — HYDROMORPHONE HYDROCHLORIDE 2 MG/ML
INJECTION, SOLUTION INTRAMUSCULAR; INTRAVENOUS; SUBCUTANEOUS AS NEEDED
Status: DISCONTINUED | OUTPATIENT
Start: 2020-08-19 | End: 2020-08-19 | Stop reason: HOSPADM

## 2020-08-19 RX ORDER — ACETAMINOPHEN 325 MG/1
650 TABLET ORAL ONCE
Status: DISCONTINUED | OUTPATIENT
Start: 2020-08-19 | End: 2020-08-19 | Stop reason: HOSPADM

## 2020-08-19 RX ORDER — FENTANYL CITRATE 50 UG/ML
50 INJECTION, SOLUTION INTRAMUSCULAR; INTRAVENOUS AS NEEDED
Status: DISCONTINUED | OUTPATIENT
Start: 2020-08-19 | End: 2020-08-19 | Stop reason: HOSPADM

## 2020-08-19 RX ORDER — FLUMAZENIL 0.1 MG/ML
0.2 INJECTION INTRAVENOUS
Status: CANCELLED | OUTPATIENT
Start: 2020-08-19 | End: 2020-08-19

## 2020-08-19 RX ORDER — SODIUM CHLORIDE 0.9 % (FLUSH) 0.9 %
5-40 SYRINGE (ML) INJECTION AS NEEDED
Status: CANCELLED | OUTPATIENT
Start: 2020-08-19

## 2020-08-19 RX ORDER — SUCCINYLCHOLINE CHLORIDE 20 MG/ML
INJECTION INTRAMUSCULAR; INTRAVENOUS AS NEEDED
Status: DISCONTINUED | OUTPATIENT
Start: 2020-08-19 | End: 2020-08-19 | Stop reason: HOSPADM

## 2020-08-19 RX ORDER — ONDANSETRON 2 MG/ML
4 INJECTION INTRAMUSCULAR; INTRAVENOUS
Status: DISCONTINUED | OUTPATIENT
Start: 2020-08-19 | End: 2020-08-19

## 2020-08-19 RX ORDER — DEXMEDETOMIDINE HYDROCHLORIDE 100 UG/ML
INJECTION, SOLUTION INTRAVENOUS AS NEEDED
Status: DISCONTINUED | OUTPATIENT
Start: 2020-08-19 | End: 2020-08-19 | Stop reason: HOSPADM

## 2020-08-19 RX ORDER — OXYCODONE AND ACETAMINOPHEN 5; 325 MG/1; MG/1
1 TABLET ORAL AS NEEDED
Status: DISCONTINUED | OUTPATIENT
Start: 2020-08-19 | End: 2020-08-19 | Stop reason: HOSPADM

## 2020-08-19 RX ORDER — SODIUM CHLORIDE 0.9 % (FLUSH) 0.9 %
5-40 SYRINGE (ML) INJECTION EVERY 8 HOURS
Status: CANCELLED | OUTPATIENT
Start: 2020-08-19

## 2020-08-19 RX ORDER — DEXTROMETHORPHAN/PSEUDOEPHED 2.5-7.5/.8
1.2 DROPS ORAL
Status: CANCELLED | OUTPATIENT
Start: 2020-08-19

## 2020-08-19 RX ORDER — BUPROPION HYDROCHLORIDE 75 MG/1
75 TABLET ORAL 2 TIMES DAILY
Status: DISCONTINUED | OUTPATIENT
Start: 2020-08-19 | End: 2020-08-22

## 2020-08-19 RX ORDER — FENTANYL CITRATE 50 UG/ML
25 INJECTION, SOLUTION INTRAMUSCULAR; INTRAVENOUS
Status: DISCONTINUED | OUTPATIENT
Start: 2020-08-19 | End: 2020-08-19 | Stop reason: HOSPADM

## 2020-08-19 RX ORDER — KETAMINE HYDROCHLORIDE 10 MG/ML
INJECTION, SOLUTION INTRAMUSCULAR; INTRAVENOUS AS NEEDED
Status: DISCONTINUED | OUTPATIENT
Start: 2020-08-19 | End: 2020-08-19 | Stop reason: HOSPADM

## 2020-08-19 RX ORDER — SODIUM CHLORIDE 9 MG/ML
25 INJECTION, SOLUTION INTRAVENOUS CONTINUOUS
Status: DISCONTINUED | OUTPATIENT
Start: 2020-08-19 | End: 2020-08-19 | Stop reason: HOSPADM

## 2020-08-19 RX ORDER — LIDOCAINE HYDROCHLORIDE 20 MG/ML
INJECTION, SOLUTION EPIDURAL; INFILTRATION; INTRACAUDAL; PERINEURAL AS NEEDED
Status: DISCONTINUED | OUTPATIENT
Start: 2020-08-19 | End: 2020-08-19 | Stop reason: HOSPADM

## 2020-08-19 RX ORDER — DIPHENHYDRAMINE HYDROCHLORIDE 50 MG/ML
12.5 INJECTION, SOLUTION INTRAMUSCULAR; INTRAVENOUS
Status: ACTIVE | OUTPATIENT
Start: 2020-08-19 | End: 2020-08-20

## 2020-08-19 RX ORDER — MIDAZOLAM HYDROCHLORIDE 1 MG/ML
0.5 INJECTION, SOLUTION INTRAMUSCULAR; INTRAVENOUS
Status: DISCONTINUED | OUTPATIENT
Start: 2020-08-19 | End: 2020-08-19 | Stop reason: HOSPADM

## 2020-08-19 RX ORDER — SODIUM CHLORIDE 0.9 % (FLUSH) 0.9 %
5-40 SYRINGE (ML) INJECTION EVERY 8 HOURS
Status: DISCONTINUED | OUTPATIENT
Start: 2020-08-19 | End: 2020-08-19 | Stop reason: HOSPADM

## 2020-08-19 RX ORDER — SODIUM CHLORIDE 0.9 % (FLUSH) 0.9 %
5-40 SYRINGE (ML) INJECTION EVERY 8 HOURS
Status: DISCONTINUED | OUTPATIENT
Start: 2020-08-19 | End: 2020-08-19

## 2020-08-19 RX ORDER — DIPHENHYDRAMINE HCL 25 MG
25 CAPSULE ORAL
Status: DISCONTINUED | OUTPATIENT
Start: 2020-08-19 | End: 2020-08-22 | Stop reason: HOSPADM

## 2020-08-19 RX ORDER — LIDOCAINE HYDROCHLORIDE 10 MG/ML
0.1 INJECTION, SOLUTION EPIDURAL; INFILTRATION; INTRACAUDAL; PERINEURAL AS NEEDED
Status: DISCONTINUED | OUTPATIENT
Start: 2020-08-19 | End: 2020-08-19 | Stop reason: HOSPADM

## 2020-08-19 RX ORDER — MORPHINE SULFATE 10 MG/ML
2 INJECTION, SOLUTION INTRAMUSCULAR; INTRAVENOUS
Status: DISCONTINUED | OUTPATIENT
Start: 2020-08-19 | End: 2020-08-19 | Stop reason: HOSPADM

## 2020-08-19 RX ORDER — ATROPINE SULFATE 0.1 MG/ML
0.5 INJECTION INTRAVENOUS
Status: CANCELLED | OUTPATIENT
Start: 2020-08-19 | End: 2020-08-20

## 2020-08-19 RX ORDER — FENTANYL CITRATE 50 UG/ML
25-200 INJECTION, SOLUTION INTRAMUSCULAR; INTRAVENOUS
Status: CANCELLED | OUTPATIENT
Start: 2020-08-19 | End: 2020-08-19

## 2020-08-19 RX ORDER — FENTANYL CITRATE 50 UG/ML
INJECTION, SOLUTION INTRAMUSCULAR; INTRAVENOUS AS NEEDED
Status: DISCONTINUED | OUTPATIENT
Start: 2020-08-19 | End: 2020-08-19 | Stop reason: HOSPADM

## 2020-08-19 RX ORDER — SODIUM CHLORIDE 0.9 % (FLUSH) 0.9 %
5-40 SYRINGE (ML) INJECTION EVERY 8 HOURS
Status: DISCONTINUED | OUTPATIENT
Start: 2020-08-19 | End: 2020-08-22 | Stop reason: HOSPADM

## 2020-08-19 RX ORDER — MIDAZOLAM HYDROCHLORIDE 1 MG/ML
INJECTION, SOLUTION INTRAMUSCULAR; INTRAVENOUS AS NEEDED
Status: DISCONTINUED | OUTPATIENT
Start: 2020-08-19 | End: 2020-08-19 | Stop reason: HOSPADM

## 2020-08-19 RX ORDER — NALOXONE HYDROCHLORIDE 0.4 MG/ML
0.4 INJECTION, SOLUTION INTRAMUSCULAR; INTRAVENOUS; SUBCUTANEOUS
Status: CANCELLED | OUTPATIENT
Start: 2020-08-19 | End: 2020-08-19

## 2020-08-19 RX ORDER — DIPHENHYDRAMINE HYDROCHLORIDE 50 MG/ML
12.5 INJECTION, SOLUTION INTRAMUSCULAR; INTRAVENOUS AS NEEDED
Status: DISCONTINUED | OUTPATIENT
Start: 2020-08-19 | End: 2020-08-19 | Stop reason: HOSPADM

## 2020-08-19 RX ORDER — SODIUM CHLORIDE 0.9 % (FLUSH) 0.9 %
5-40 SYRINGE (ML) INJECTION AS NEEDED
Status: DISCONTINUED | OUTPATIENT
Start: 2020-08-19 | End: 2020-08-22 | Stop reason: HOSPADM

## 2020-08-19 RX ORDER — EPINEPHRINE 0.1 MG/ML
1 INJECTION INTRACARDIAC; INTRAVENOUS
Status: CANCELLED | OUTPATIENT
Start: 2020-08-19 | End: 2020-08-20

## 2020-08-19 RX ORDER — HYDROMORPHONE HYDROCHLORIDE 1 MG/ML
0.2 INJECTION, SOLUTION INTRAMUSCULAR; INTRAVENOUS; SUBCUTANEOUS
Status: DISCONTINUED | OUTPATIENT
Start: 2020-08-19 | End: 2020-08-19 | Stop reason: HOSPADM

## 2020-08-19 RX ORDER — SODIUM CHLORIDE 0.9 % (FLUSH) 0.9 %
5-40 SYRINGE (ML) INJECTION AS NEEDED
Status: DISCONTINUED | OUTPATIENT
Start: 2020-08-19 | End: 2020-08-19

## 2020-08-19 RX ORDER — HYDROMORPHONE HYDROCHLORIDE 2 MG/ML
1 INJECTION, SOLUTION INTRAMUSCULAR; INTRAVENOUS; SUBCUTANEOUS ONCE
Status: DISCONTINUED | OUTPATIENT
Start: 2020-08-19 | End: 2020-08-19 | Stop reason: ALTCHOICE

## 2020-08-19 RX ORDER — ACETAMINOPHEN 325 MG/1
650 TABLET ORAL
Status: DISCONTINUED | OUTPATIENT
Start: 2020-08-19 | End: 2020-08-19

## 2020-08-19 RX ORDER — ONDANSETRON 2 MG/ML
INJECTION INTRAMUSCULAR; INTRAVENOUS AS NEEDED
Status: DISCONTINUED | OUTPATIENT
Start: 2020-08-19 | End: 2020-08-19 | Stop reason: HOSPADM

## 2020-08-19 RX ORDER — ALBUTEROL SULFATE 0.83 MG/ML
2.5 SOLUTION RESPIRATORY (INHALATION)
Status: DISCONTINUED | OUTPATIENT
Start: 2020-08-19 | End: 2020-08-22 | Stop reason: HOSPADM

## 2020-08-19 RX ORDER — LORAZEPAM 2 MG/ML
1 INJECTION INTRAMUSCULAR
Status: DISCONTINUED | OUTPATIENT
Start: 2020-08-19 | End: 2020-08-19

## 2020-08-19 RX ORDER — SODIUM CHLORIDE, SODIUM LACTATE, POTASSIUM CHLORIDE, CALCIUM CHLORIDE 600; 310; 30; 20 MG/100ML; MG/100ML; MG/100ML; MG/100ML
75 INJECTION, SOLUTION INTRAVENOUS CONTINUOUS
Status: DISCONTINUED | OUTPATIENT
Start: 2020-08-19 | End: 2020-08-21

## 2020-08-19 RX ORDER — NALOXONE HYDROCHLORIDE 0.4 MG/ML
0.4 INJECTION, SOLUTION INTRAMUSCULAR; INTRAVENOUS; SUBCUTANEOUS AS NEEDED
Status: DISCONTINUED | OUTPATIENT
Start: 2020-08-19 | End: 2020-08-22 | Stop reason: HOSPADM

## 2020-08-19 RX ORDER — BUPIVACAINE HYDROCHLORIDE AND EPINEPHRINE 5; 5 MG/ML; UG/ML
INJECTION, SOLUTION EPIDURAL; INTRACAUDAL; PERINEURAL AS NEEDED
Status: DISCONTINUED | OUTPATIENT
Start: 2020-08-19 | End: 2020-08-19 | Stop reason: HOSPADM

## 2020-08-19 RX ORDER — ONDANSETRON 2 MG/ML
4 INJECTION INTRAMUSCULAR; INTRAVENOUS AS NEEDED
Status: DISCONTINUED | OUTPATIENT
Start: 2020-08-19 | End: 2020-08-19 | Stop reason: HOSPADM

## 2020-08-19 RX ORDER — KETOROLAC TROMETHAMINE 30 MG/ML
15 INJECTION, SOLUTION INTRAMUSCULAR; INTRAVENOUS
Status: COMPLETED | OUTPATIENT
Start: 2020-08-19 | End: 2020-08-19

## 2020-08-19 RX ORDER — PROMETHAZINE HYDROCHLORIDE 25 MG/1
12.5 TABLET ORAL
Status: DISCONTINUED | OUTPATIENT
Start: 2020-08-19 | End: 2020-08-19

## 2020-08-19 RX ORDER — HYDROMORPHONE HYDROCHLORIDE 1 MG/ML
1 INJECTION, SOLUTION INTRAMUSCULAR; INTRAVENOUS; SUBCUTANEOUS
Status: DISCONTINUED | OUTPATIENT
Start: 2020-08-19 | End: 2020-08-22 | Stop reason: HOSPADM

## 2020-08-19 RX ORDER — HYDROMORPHONE HYDROCHLORIDE 2 MG/ML
0.5 INJECTION, SOLUTION INTRAMUSCULAR; INTRAVENOUS; SUBCUTANEOUS ONCE
Status: COMPLETED | OUTPATIENT
Start: 2020-08-19 | End: 2020-08-19

## 2020-08-19 RX ORDER — ALBUTEROL SULFATE 90 UG/1
2 AEROSOL, METERED RESPIRATORY (INHALATION)
Status: DISCONTINUED | OUTPATIENT
Start: 2020-08-19 | End: 2020-08-19 | Stop reason: CLARIF

## 2020-08-19 RX ADMIN — SODIUM CHLORIDE 1000 ML: 9 INJECTION, SOLUTION INTRAVENOUS at 07:50

## 2020-08-19 RX ADMIN — KETOROLAC TROMETHAMINE 15 MG: 30 INJECTION, SOLUTION INTRAMUSCULAR at 23:37

## 2020-08-19 RX ADMIN — DEXAMETHASONE SODIUM PHOSPHATE 8 MG: 4 INJECTION, SOLUTION INTRAMUSCULAR; INTRAVENOUS at 16:27

## 2020-08-19 RX ADMIN — CEFOTETAN DISODIUM 2 G: 2 INJECTION, POWDER, FOR SOLUTION INTRAMUSCULAR; INTRAVENOUS at 22:25

## 2020-08-19 RX ADMIN — SODIUM CHLORIDE, SODIUM LACTATE, POTASSIUM CHLORIDE, AND CALCIUM CHLORIDE 125 ML/HR: 600; 310; 30; 20 INJECTION, SOLUTION INTRAVENOUS at 15:00

## 2020-08-19 RX ADMIN — SODIUM CHLORIDE, POTASSIUM CHLORIDE, SODIUM LACTATE AND CALCIUM CHLORIDE 125 ML/HR: 600; 310; 30; 20 INJECTION, SOLUTION INTRAVENOUS at 22:00

## 2020-08-19 RX ADMIN — DIPHENHYDRAMINE HYDROCHLORIDE 25 MG: 25 CAPSULE ORAL at 23:37

## 2020-08-19 RX ADMIN — DEXMEDETOMIDINE HYDROCHLORIDE 10 MCG: 100 INJECTION, SOLUTION, CONCENTRATE INTRAVENOUS at 16:53

## 2020-08-19 RX ADMIN — MEPERIDINE HYDROCHLORIDE 25 MG: 25 INJECTION INTRAMUSCULAR; INTRAVENOUS; SUBCUTANEOUS at 15:49

## 2020-08-19 RX ADMIN — PROMETHAZINE HYDROCHLORIDE 25 MG: 25 INJECTION INTRAMUSCULAR; INTRAVENOUS at 11:54

## 2020-08-19 RX ADMIN — HYDROMORPHONE HYDROCHLORIDE 1 MG: 2 INJECTION INTRAMUSCULAR; INTRAVENOUS; SUBCUTANEOUS at 08:05

## 2020-08-19 RX ADMIN — Medication 2 G: at 16:37

## 2020-08-19 RX ADMIN — BUPROPION HYDROCHLORIDE 75 MG: 75 TABLET, FILM COATED ORAL at 22:25

## 2020-08-19 RX ADMIN — ROCURONIUM BROMIDE 40 MG: 10 SOLUTION INTRAVENOUS at 16:24

## 2020-08-19 RX ADMIN — ROCURONIUM BROMIDE 10 MG: 10 SOLUTION INTRAVENOUS at 16:21

## 2020-08-19 RX ADMIN — SODIUM CHLORIDE, SODIUM LACTATE, POTASSIUM CHLORIDE, AND CALCIUM CHLORIDE 125 ML/HR: 600; 310; 30; 20 INJECTION, SOLUTION INTRAVENOUS at 19:56

## 2020-08-19 RX ADMIN — ROCURONIUM BROMIDE 25 MG: 10 SOLUTION INTRAVENOUS at 17:13

## 2020-08-19 RX ADMIN — FENTANYL CITRATE 100 MCG: 50 INJECTION INTRAMUSCULAR; INTRAVENOUS at 15:00

## 2020-08-19 RX ADMIN — HYDROMORPHONE HYDROCHLORIDE 0.5 MG: 2 INJECTION INTRAMUSCULAR; INTRAVENOUS; SUBCUTANEOUS at 07:44

## 2020-08-19 RX ADMIN — MIDAZOLAM HYDROCHLORIDE 2 MG: 1 INJECTION, SOLUTION INTRAMUSCULAR; INTRAVENOUS at 16:14

## 2020-08-19 RX ADMIN — ONDANSETRON 4 MG: 2 INJECTION INTRAMUSCULAR; INTRAVENOUS at 15:00

## 2020-08-19 RX ADMIN — SODIUM CHLORIDE, POTASSIUM CHLORIDE, SODIUM LACTATE AND CALCIUM CHLORIDE: 600; 310; 30; 20 INJECTION, SOLUTION INTRAVENOUS at 16:29

## 2020-08-19 RX ADMIN — KETOROLAC TROMETHAMINE 15 MG: 30 INJECTION, SOLUTION INTRAMUSCULAR at 10:05

## 2020-08-19 RX ADMIN — HYDROMORPHONE HYDROCHLORIDE 1 MG: 2 INJECTION INTRAMUSCULAR; INTRAVENOUS; SUBCUTANEOUS at 09:11

## 2020-08-19 RX ADMIN — HYDROMORPHONE HYDROCHLORIDE 1 MG: 2 INJECTION, SOLUTION INTRAMUSCULAR; INTRAVENOUS; SUBCUTANEOUS at 16:42

## 2020-08-19 RX ADMIN — SODIUM CHLORIDE, POTASSIUM CHLORIDE, SODIUM LACTATE AND CALCIUM CHLORIDE: 600; 310; 30; 20 INJECTION, SOLUTION INTRAVENOUS at 16:06

## 2020-08-19 RX ADMIN — MIDAZOLAM 2 MG: 1 INJECTION INTRAMUSCULAR; INTRAVENOUS at 15:10

## 2020-08-19 RX ADMIN — KETAMINE HYDROCHLORIDE 25 MG: 10 INJECTION, SOLUTION INTRAMUSCULAR; INTRAVENOUS at 16:21

## 2020-08-19 RX ADMIN — KETAMINE HYDROCHLORIDE 25 MG: 10 INJECTION, SOLUTION INTRAMUSCULAR; INTRAVENOUS at 16:27

## 2020-08-19 RX ADMIN — HYDROMORPHONE HYDROCHLORIDE 1 MG: 2 INJECTION INTRAMUSCULAR; INTRAVENOUS; SUBCUTANEOUS at 11:27

## 2020-08-19 RX ADMIN — ROCURONIUM BROMIDE 10 MG: 10 SOLUTION INTRAVENOUS at 18:24

## 2020-08-19 RX ADMIN — Medication 10 ML: at 19:57

## 2020-08-19 RX ADMIN — FENTANYL CITRATE 100 MCG: 50 INJECTION, SOLUTION INTRAMUSCULAR; INTRAVENOUS at 16:21

## 2020-08-19 RX ADMIN — SODIUM CHLORIDE 40 MG: 9 INJECTION INTRAMUSCULAR; INTRAVENOUS; SUBCUTANEOUS at 10:06

## 2020-08-19 RX ADMIN — LIDOCAINE HYDROCHLORIDE 60 MG: 20 INJECTION, SOLUTION EPIDURAL; INFILTRATION; INTRACAUDAL; PERINEURAL at 16:21

## 2020-08-19 RX ADMIN — PROPOFOL 150 MG: 10 INJECTION, EMULSION INTRAVENOUS at 16:21

## 2020-08-19 RX ADMIN — ROCURONIUM BROMIDE 5 MG: 10 SOLUTION INTRAVENOUS at 18:12

## 2020-08-19 RX ADMIN — ONDANSETRON 4 MG: 2 INJECTION INTRAMUSCULAR; INTRAVENOUS at 07:43

## 2020-08-19 RX ADMIN — ONDANSETRON HYDROCHLORIDE 4 MG: 2 INJECTION, SOLUTION INTRAMUSCULAR; INTRAVENOUS at 16:27

## 2020-08-19 RX ADMIN — SUCCINYLCHOLINE CHLORIDE 160 MG: 20 INJECTION, SOLUTION INTRAMUSCULAR; INTRAVENOUS at 16:21

## 2020-08-19 RX ADMIN — MIDAZOLAM HYDROCHLORIDE 3 MG: 1 INJECTION, SOLUTION INTRAMUSCULAR; INTRAVENOUS at 16:11

## 2020-08-19 NOTE — ED NOTES
Pt reporting no relief of pain. Found standing at bedside, moaning, crying \"I can't take it anymore\". MD notified. Pt unable to keep BP/SPO2 monitoring equipment on due to restlessness- MD aware.

## 2020-08-19 NOTE — ED NOTES
Pt returned from CT. Found standing, pacing at bedside- pt reports no relief of pain after IV medications. MD notified and at bedside speaking with pt about plan of care.

## 2020-08-19 NOTE — BRIEF OP NOTE
Brief Postoperative Note    Patient: Iliana Thomas  YOB: 1985  MRN: [id number]    Date of Procedure: 8/19/2020     Pre-Op Diagnosis: ABDOMINAL PAIN    Post-Op Diagnosis:INTERNAL HERNIA    Procedure(s):  DIAGNOSTIC LAPAROSCOPY/ REPAIR OF INTERNAL HERNIA/ SMALL BOWEL RESECTION    Surgeon(s):  Yannick Clarke MD    Surgical Assistant: Mary Siegel    Anesthesia: General     Estimated Blood Loss (mL): Minimal    Complications:  Other: small bowel enterotomy    Specimens:   ID Type Source Tests Collected by Time Destination   1 : PORTION OF SMALL BOWEL Fresh Small Bowel  Yannick Clarke MD 8/19/2020 1827 Pathology        Implants: * No implants in log *    Drains: * No LDAs found *    Findings: internal hernia with all of the common channel bowel into the retro Jhonatan (Meeks's space) internal hernia, no JJ internal hernia found small bowel reduced and repaired internal hernia primarily, distal small bowel enterotomy was make reducing the small bowel and this segment was resected about 10cm with primary anastomosis    Electronically Signed by Ruthann Ontiveros MD on 8/19/2020 at 7:19 PM

## 2020-08-19 NOTE — PERIOP NOTES
Cont to c/o severe abd pain 10/10, moaning loudly, unable to lay still. Up on knees bent over. Spoke with Dr Mirian Camarena.  Given 25mg Demerol as ordered at  Xim[facility] Young

## 2020-08-19 NOTE — ROUTINE PROCESS
Patient: Marsha Craig MRN: [id number]  SSN: [id number] YOB: 1985  Age: 38 y.o. Sex: female Patient is status post Procedure(s): DIAGNOSTIC LAPAROSCOPY/ REPAIR OF INTERNAL HERNIA/ SMALL BOWEL RESECTION. Surgeon(s) and Role: 
   More Alfonso MD - Primary Local/Dose/Irrigation:  SEE MAR Peripheral IV 08/19/20 Left Antecubital (Active) Site Assessment Clean, dry, & intact 08/19/20 6807 Phlebitis Assessment 0 08/19/20 0716 Infiltration Assessment 0 08/19/20 0716 Dressing Status Clean, dry, & intact 08/19/20 0716 Dressing Type Transparent 08/19/20 0716 Hub Color/Line Status Pink 08/19/20 0716 Alcohol Cap Used Yes 08/19/20 0716 Peripheral IV 08/19/20 Left Hand (Active) Airway - Endotracheal Tube 08/19/20 Oral (Active) Dressing/Packing:  Wound Abdomen 4 TROCAR SITES AND INCISION-Dressing Type: 4 x 4;Special tape (comment); Staples(DERMABOND) (08/19/20 1842) Splint/Cast:  ] Other:

## 2020-08-19 NOTE — ED TRIAGE NOTES
Upper abdominal pain that radiates around RIGHT side to back. Ate chicken for dinner. +N/V. Hx of gastric bypass. Loose stools.

## 2020-08-19 NOTE — CONSULTS
General Surgery ER Consultation    Admit Date: 2020  Reason for Consultation: abdominal pain    HPI:  Edilson Zamora is a 38 y.o. female who is s/p gastric sleeve converted to bypass one week later d/t stricture in  Jeison Brandt) presents to ED w/ c/o sudden onset severe, sharp pain across her upper abdomen radiating through to her back at 4am.  She tried a laxative and had a loose stool and vomited. She has not gotten any relief from pain despite narcotics. She hasn't had any problems since surgery in  other than having to be converted right after surgery. Patient Active Problem List    Diagnosis Date Noted    Abdominal pain 2020    N&V (nausea and vomiting) 2020    Short attention span 2020    Class 1 obesity due to excess calories without serious comorbidity with body mass index (BMI) of 32.0 to 32.9 in adult 2018    Masses of both breasts 2017    Anemia 2017    S/P gastric bypass 2017    Adjustment insomnia 2017     Past Medical History:   Diagnosis Date    Anemia     Nausea & vomiting     Transient elevated blood pressure       Past Surgical History:   Procedure Laterality Date    ABDOMEN SURGERY PROC UNLISTED      gastric bypass    HX BREAST BIOPSY Bilateral     not sure of dates one  not sure of other date    HX  SECTION  9/10    HX DILATION AND CURETTAGE      HX OTHER SURGICAL      gastric banding. Social History     Tobacco Use    Smoking status: Current Every Day Smoker     Packs/day: 0.25     Years: 6.00     Pack years: 1.50    Smokeless tobacco: Never Used   Substance Use Topics    Alcohol use:  Yes     Alcohol/week: 0.0 standard drinks     Comment: Daily      Family History   Problem Relation Age of Onset    Hypertension Mother     Diabetes Mother     Hypertension Father     Diabetes Father     Cancer Father         lung    Hypertension Brother     Other Brother     Hypertension Sister  Breast Cancer Paternal Grandmother         age unknown      Prior to Admission medications    Medication Sig Start Date End Date Taking? Authorizing Provider   azithromycin (ZITHROMAX) 250 mg tablet 2 first day then one tab daily till finished 4/14/20   Radha Paulino MD   albuterol (PROVENTIL HFA, VENTOLIN HFA, PROAIR HFA) 90 mcg/actuation inhaler Take 1 Puff by inhalation every four (4) hours as needed for Wheezing. 4/14/20   Radha Paulino MD   varenicline (CHANTIX STARTER LO) 0.5 mg (11)- 1 mg (42) DsPk As directed one package 4/14/20   Radha Paulino MD   naproxen (NAPROSYN) 500 mg tablet Take 1 Tab by mouth two (2) times daily as needed for Pain. 3/5/20   Escobar Maza MD   doxylamine succinate (SLEEP AID, DOXYLAMINE, PO) Take  by mouth. Provider, Historical   buPROPion (WELLBUTRIN) 75 mg tablet Take one tab daily for the first week and then BID afterwards. Patient taking differently: Take 75 mg by mouth two (2) times a day. Take one tab daily for the first week and then BID afterwards. 1/3/20   Joel Maza MD     Allergies   Allergen Reactions    Seafood [Iodine And Iodide Containing Products] Hives    Seafood [Shrimp] Hives          Subjective:     Review of Systems:    A comprehensive review of systems was negative except for that written in the History of Present Illness. Objective:     Blood pressure 157/87, pulse 78, temperature 98.3 °F (36.8 °C), resp. rate 25, height 5' 3\" (1.6 m), weight 164 lb 0.4 oz (74.4 kg), last menstrual period 08/08/2020, SpO2 100 %. Recent Results (from the past 24 hour(s))   SAMPLES BEING HELD    Collection Time: 08/19/20  7:13 AM   Result Value Ref Range    SAMPLES BEING HELD 1blu,1red,1lav,pst,1UA,1UC     COMMENT        Add-on orders for these samples will be processed based on acceptable specimen integrity and analyte stability, which may vary by analyte.    CBC WITH AUTOMATED DIFF    Collection Time: 08/19/20  7:13 AM   Result Value Ref Range WBC 7.3 3.6 - 11.0 K/uL    RBC 4.51 3.80 - 5.20 M/uL    HGB 12.1 11.5 - 16.0 g/dL    HCT 37.1 35.0 - 47.0 %    MCV 82.3 80.0 - 99.0 FL    MCH 26.8 26.0 - 34.0 PG    MCHC 32.6 30.0 - 36.5 g/dL    RDW 14.9 (H) 11.5 - 14.5 %    PLATELET 012 (H) 738 - 400 K/uL    MPV 10.6 8.9 - 12.9 FL    NRBC 0.0 0  WBC    ABSOLUTE NRBC 0.00 0.00 - 0.01 K/uL    NEUTROPHILS 75 32 - 75 %    LYMPHOCYTES 14 12 - 49 %    MONOCYTES 10 5 - 13 %    EOSINOPHILS 0 0 - 7 %    BASOPHILS 1 0 - 1 %    IMMATURE GRANULOCYTES 0 0.0 - 0.5 %    ABS. NEUTROPHILS 5.5 1.8 - 8.0 K/UL    ABS. LYMPHOCYTES 1.0 0.8 - 3.5 K/UL    ABS. MONOCYTES 0.7 0.0 - 1.0 K/UL    ABS. EOSINOPHILS 0.0 0.0 - 0.4 K/UL    ABS. BASOPHILS 0.0 0.0 - 0.1 K/UL    ABS. IMM. GRANS. 0.0 0.00 - 0.04 K/UL    DF AUTOMATED     METABOLIC PANEL, COMPREHENSIVE    Collection Time: 08/19/20  7:13 AM   Result Value Ref Range    Sodium 136 136 - 145 mmol/L    Potassium 3.7 3.5 - 5.1 mmol/L    Chloride 104 97 - 108 mmol/L    CO2 24 21 - 32 mmol/L    Anion gap 8 5 - 15 mmol/L    Glucose 96 65 - 100 mg/dL    BUN 13 6 - 20 MG/DL    Creatinine 0.74 0.55 - 1.02 MG/DL    BUN/Creatinine ratio 18 12 - 20      GFR est AA >60 >60 ml/min/1.73m2    GFR est non-AA >60 >60 ml/min/1.73m2    Calcium 9.7 8.5 - 10.1 MG/DL    Bilirubin, total 0.4 0.2 - 1.0 MG/DL    ALT (SGPT) 33 12 - 78 U/L    AST (SGOT) 28 15 - 37 U/L    Alk.  phosphatase 70 45 - 117 U/L    Protein, total 8.5 (H) 6.4 - 8.2 g/dL    Albumin 4.2 3.5 - 5.0 g/dL    Globulin 4.3 (H) 2.0 - 4.0 g/dL    A-G Ratio 1.0 (L) 1.1 - 2.2     LIPASE    Collection Time: 08/19/20  7:13 AM   Result Value Ref Range    Lipase 103 73 - 393 U/L   URINALYSIS W/MICROSCOPIC    Collection Time: 08/19/20  7:13 AM   Result Value Ref Range    Color YELLOW/STRAW      Appearance CLEAR CLEAR      Specific gravity 1.018 1.003 - 1.030      pH (UA) >8.5 (H) 5.0 - 8.0    Protein Negative NEG mg/dL    Glucose Negative NEG mg/dL    Ketone 40 (A) NEG mg/dL    Bilirubin Negative NEG Blood Negative NEG      Urobilinogen 1.0 0.2 - 1.0 EU/dL    Nitrites Negative NEG      Leukocyte Esterase Negative NEG      WBC 0-4 0 - 4 /hpf    RBC 0-5 0 - 5 /hpf    Epithelial cells FEW FEW /lpf    Bacteria 1+ (A) NEG /hpf   URINE CULTURE HOLD SAMPLE    Collection Time: 08/19/20  7:13 AM    Specimen: Serum; Urine   Result Value Ref Range    Urine culture hold        Urine on hold in Microbiology dept for 2 days. If unpreserved urine is submitted, it cannot be used for addtional testing after 24 hours, recollection will be required. HCG URINE, QL. - POC    Collection Time: 08/19/20  7:21 AM   Result Value Ref Range    Pregnancy test,urine (POC) Negative NEG     EKG, 12 LEAD, INITIAL    Collection Time: 08/19/20 12:46 PM   Result Value Ref Range    Ventricular Rate 71 BPM    Atrial Rate 71 BPM    P-R Interval 160 ms    QRS Duration 80 ms    Q-T Interval 422 ms    QTC Calculation (Bezet) 458 ms    Calculated P Axis 67 degrees    Calculated R Axis 37 degrees    Calculated T Axis 40 degrees    Diagnosis       Sinus rhythm with marked sinus arrhythmia  When compared with ECG of 25-JUL-2013 16:18,  No significant change was found  Confirmed by Daphney Turcios M.D., [facility] ([id number]) on 8/19/2020 1:27:59 PM       _____________________  Physical Exam:     General:  Alert, cooperative, standing up bent over to get relief of pain   Eyes:   Sclera clear. Throat: Lips, mucosa, and tongue normal.   Neck: Supple, symmetrical, trachea midline. Lungs:   Clear to auscultation bilaterally. Heart:  Regular rate and rhythm. Abdomen:   Soft, TTP across upper abdomen; minimal bowel sounds; No masses,  No organomegaly. Extremities: Extremities normal, atraumatic, no cyanosis or edema. Skin: Skin color, texture, turgor normal. No rashes or lesions.            Assessment:   Active Problems:    Abdominal pain (8/19/2020)      N&V (nausea and vomiting) (8/19/2020)            Plan:     EGD (Dr Funmilayo Perez) - r/o anastamotic or other ulcer  Dr Suki Mendez to review CT scan  NPO  IVF  Pain mgmt  following    Total time spent with patient: 22 minutes. Signed By: Juan Nieto NP     August 19, 2020        I have independently examined the patient and have reviewed the chart. SHe does appear to have an internal hernia related to her gastric bypass. She will need emergent diagnostic laparoscopy with possible repair of internal hernia. There is swirl on the CT the radiologist did not see. She is writhing in pain and clinically appears to have this as well. I have discussed the above procedure with the patient in detail. We reviewed the benefits and possible complications of the surgery which include bleeding, infection, damage to adjacent organs, venous thromboembolism, need for repeat surgery, death and other unforseen complications. The patient agreed to proceed with the surgery.         Ericka Springer MD

## 2020-08-19 NOTE — ED NOTES
Pt reporting no relief of pain after multiple doses of IV pain medication. MD aware. GI to come consult with pt.

## 2020-08-19 NOTE — ED NOTES
Pt ambulatory to ED 10 after being found lying on the floor crying and moaning. Pt refusing cardiac and SPO2 monitoring at this time \"I can't lay still in bed\". MD aware. Will continue to monitor.

## 2020-08-19 NOTE — ED PROVIDER NOTES
HPI .  Patient had a sleeve gastrectomy/hiatal hernia repair 2013. Patient had a postoperative stricture and stayed in the hospital an extra week. Patient reports having similar pain months ago once that seem to be relieved after having flatus. This pain started 3 hours prior to arrival.  Pain is severe bilateral upper quadrant and radiates into the back. Patient took a laxative and then vomited. Pain is constant. Pain is sharp. Patient was eating normally and there have been no chronic issues with swallowing or digesting food. Nothing specifically increases or decreases the pain. Past Medical History:   Diagnosis Date    Anemia     Nausea & vomiting     Transient elevated blood pressure        Past Surgical History:   Procedure Laterality Date    ABDOMEN SURGERY PROC UNLISTED      gastric bypass    HX BREAST BIOPSY Bilateral     not sure of dates one 2009 not sure of other date    HX  SECTION  9/10    HX DILATION AND CURETTAGE      HX OTHER SURGICAL      gastric banding.           Family History:   Problem Relation Age of Onset    Hypertension Mother     Diabetes Mother     Hypertension Father     Diabetes Father     Cancer Father         lung    Hypertension Brother     Other Brother     Hypertension Sister     Breast Cancer Paternal Grandmother         age unknown       Social History     Socioeconomic History    Marital status:      Spouse name: Not on file    Number of children: Not on file    Years of education: Not on file    Highest education level: Not on file   Occupational History    Not on file   Social Needs    Financial resource strain: Not on file    Food insecurity     Worry: Not on file     Inability: Not on file   [employer] needs     Medical: Not on file     Non-medical: Not on file   Tobacco Use    Smoking status: Current Every Day Smoker     Packs/day: 0.25     Years: 6.00     Pack years: 1.50    Smokeless tobacco: Never Used Substance and Sexual Activity    Alcohol use: Yes     Alcohol/week: 0.0 standard drinks     Comment: Daily    Drug use: Never    Sexual activity: Yes     Partners: Male   Lifestyle    Physical activity     Days per week: Not on file     Minutes per session: Not on file    Stress: Not on file   Relationships    Social connections     Talks on phone: Not on file     Gets together: Not on file     Attends Oriental orthodox service: Not on file     Active member of club or organization: Not on file     Attends meetings of clubs or organizations: Not on file     Relationship status: Not on file    Intimate partner violence     Fear of current or ex partner: Not on file     Emotionally abused: Not on file     Physically abused: Not on file     Forced sexual activity: Not on file   Other Topics Concern    Not on file   Social History Narrative    Not on file         ALLERGIES: Seafood [iodine and iodide containing products] and Seafood [shrimp]    Review of Systems   Constitutional: Negative for fever. HENT: Negative for congestion. Eyes: Negative for redness. Respiratory: Negative for cough. Cardiovascular: Negative for chest pain. Gastrointestinal: Positive for abdominal pain. Negative for abdominal distention. Genitourinary: Negative for difficulty urinating. Neurological: Negative for speech difficulty. Psychiatric/Behavioral: Positive for agitation and behavioral problems. The patient is nervous/anxious. Vitals:    08/19/20 0700   BP: 159/83   Pulse: 82   Resp: (!) 35   Temp: 98.3 °F (36.8 °C)   SpO2: 98%   Weight: 74.4 kg (164 lb 0.4 oz)   Height: 5' 3\" (1.6 m)            Physical Exam  Vitals signs and nursing note reviewed. Constitutional:       Appearance: She is well-developed. Comments: Appears to be having severe pain but no distress   HENT:      Head: Normocephalic and atraumatic. Eyes:      Pupils: Pupils are equal, round, and reactive to light.    Neck: Musculoskeletal: Normal range of motion and neck supple. Cardiovascular:      Rate and Rhythm: Normal rate and regular rhythm. Heart sounds: Normal heart sounds. No murmur. No friction rub. No gallop. Pulmonary:      Effort: Pulmonary effort is normal. No respiratory distress. Breath sounds: No wheezing or rales. Abdominal:      Palpations: Abdomen is soft. Tenderness: There is abdominal tenderness. There is no rebound. Comments: Bilateral upper quadrant pain   Musculoskeletal: Normal range of motion. General: No tenderness. Skin:     Findings: No erythema. Neurological:      Mental Status: She is alert. Cranial Nerves: No cranial nerve deficit. Comments: Motor; symmetric   Psychiatric:         Behavior: Behavior normal.          [facility]       Procedures      Note: I found a brief discharge summary from 2013 but not the operative note. I am not sure if patient had a cholecystectomy during her sleeve gastrectomy. It was not mentioned in the brief discharge summary if she did. Differential includes pancreatitis, cholecystitis, biliary colic, and complication from gastric bypass. Patient will be given pain medication, nausea medication, saline. CT scan of the abdomen will be done. She is allergic to contrast dye. Anastacio Low MD  7:41 AM           Hospitalist Seanor Text for Admission  11:39 AM    ED Room Number: ER10/10  Patient Name and age:  Basilia Bennett 38 y.o.  female  Working Diagnosis:   1. Upper abdominal pain      Readmission: no  Isolation Requirements:  no  Recommended Level of Care:  telemetry  Code Status:  FULL  Other: Gastroenterology midlevel Mr. Les Faustin saw the patient and endoscopy is planned in a few hours; gastric bypass patient with very severe pain not responding to opiates; to receive Phenergan soon  Department:[facility] Adult ED - 21       ED EKG interpretation:  Rhythm: normal sinus rhythm; and regular . Rate (approx.): 70;  Axis: normal; P wave: normal; QRS interval: normal ; ST/T wave: normal; in  Lead: ; Other findings: . This EKG was interpreted by Jay Jay Viramontes MD,ED Provider.  1:11 PM

## 2020-08-19 NOTE — CONSULTS
We received the consultation request for this patient, but she is an established patient of [facility] - EGD by Dr. Eliel Harris in 2013. I have forwarded the request to them to see.

## 2020-08-19 NOTE — CONSULTS
[address] [address]        GASTROENTEROLOGY CONSULTATION NOTE  Will Walker Cordero  [phone] office  [phone] NP/PA in-hospital cell phone M-F until 4:30PM  After 5PM or on weekends, please call  for physician on call        NAME:  Jovita Carlos   :   1985   MRN:   [id number]       Referring Physician: Dr. Cristian Ramos Date: 2020 10:49 AM    Chief Complaint: abdominal pain     History of Present Illness:  Patient is a 38 y.o. who is seen in consultation at the request of Dr. Adrien Crowell for gastric bypass 7 years ago/severe upper abdominal pain radiating to the back/CT negative. Patient has a past medical history significant for gastric bypass. She presented to the ED with complaints of abdominal pain. Patient complains of a sudden onset of upper abdominal pain that started at approximately 4AM today. Pain radiates to the back. There has been associated nausea with 3 episodes of vomiting \"foam.\"  No hematemesis. No reflux, dysphagia, or odynophagia. Patient reports a loose bowel movement this morning after taking an over the counter laxative. She also took Pepto-Bismol. No melena or hematochezia. No fevers. History of similar pain in the past (although, not as severe) that resolved. No NSAID use. No anticoagulation.  + Alcohol use (3 drinks per day). + Tobacco use. History of gastric bypass. EGD (13) by Dr. Sharon Mobley for epigastric abdominal pain/vomiting/status post gastric sleeve surgery 10 years ago by Dr. 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 I have reviewed the emergency room note, hospital admission note, notes by all other clinicians who have seen the patient during this hospitalization to date. I have reviewed the problem list and the reason for this hospitalization.  I have reviewed the allergies and the medications the patient was taking at home prior to this hospitalization. PMH:  Past Medical History:   Diagnosis Date    Anemia     Nausea & vomiting     Transient elevated blood pressure        PSH:  Past Surgical History:   Procedure Laterality Date    ABDOMEN SURGERY PROC UNLISTED      gastric bypass    HX BREAST BIOPSY Bilateral     not sure of dates one 2009 not sure of other date    HX  SECTION  9/10    HX DILATION AND CURETTAGE      HX OTHER SURGICAL      gastric banding. Allergies: Allergies   Allergen Reactions    Seafood [Iodine And Iodide Containing Products] Hives    Seafood [Shrimp] Hives       Home Medications:  Prior to Admission Medications   Prescriptions Last Dose Informant Patient Reported? Taking? albuterol (PROVENTIL HFA, VENTOLIN HFA, PROAIR HFA) 90 mcg/actuation inhaler   No No   Sig: Take 1 Puff by inhalation every four (4) hours as needed for Wheezing. azithromycin (ZITHROMAX) 250 mg tablet   No No   Si first day then one tab daily till finished   buPROPion (WELLBUTRIN) 75 mg tablet   No No   Sig: Take one tab daily for the first week and then BID afterwards. Patient taking differently: Take 75 mg by mouth two (2) times a day. Take one tab daily for the first week and then BID afterwards. doxylamine succinate (SLEEP AID, DOXYLAMINE, PO)   Yes No   Sig: Take  by mouth. naproxen (NAPROSYN) 500 mg tablet   No No   Sig: Take 1 Tab by mouth two (2) times daily as needed for Pain.   varenicline (CHANTIX STARTER LO) 0.5 mg (11)- 1 mg (42) DsPk   No No   Sig: As directed one package      Facility-Administered Medications: None       Hospital Medications:  No current facility-administered medications for this encounter.       Current Outpatient Medications   Medication Sig    azithromycin (ZITHROMAX) 250 mg tablet 2 first day then one tab daily till finished    albuterol (PROVENTIL HFA, VENTOLIN HFA, PROAIR HFA) 90 mcg/actuation inhaler Take 1 Puff by inhalation every four (4) hours as needed for Wheezing.  varenicline (CHANTIX STARTER LO) 0.5 mg (11)- 1 mg (42) DsPk As directed one package    naproxen (NAPROSYN) 500 mg tablet Take 1 Tab by mouth two (2) times daily as needed for Pain.  doxylamine succinate (SLEEP AID, DOXYLAMINE, PO) Take  by mouth.  buPROPion (WELLBUTRIN) 75 mg tablet Take one tab daily for the first week and then BID afterwards. (Patient taking differently: Take 75 mg by mouth two (2) times a day. Take one tab daily for the first week and then BID afterwards.)       Social History:  Social History     Tobacco Use    Smoking status: Current Every Day Smoker     Packs/day: 0.25     Years: 6.00     Pack years: 1.50    Smokeless tobacco: Never Used   Substance Use Topics    Alcohol use:  Yes     Alcohol/week: 0.0 standard drinks     Comment: Daily       Family History:  Family History   Problem Relation Age of Onset    Hypertension Mother     Diabetes Mother     Hypertension Father     Diabetes Father     Cancer Father         lung    Hypertension Brother     Other Brother     Hypertension Sister     Breast Cancer Paternal Grandmother         age unknown       Review of Systems:  Constitutional: negative fever, negative chills, negative weight loss  Eyes:   negative visual changes  ENT:   negative sore throat, tongue or lip swelling  Respiratory:  negative cough, negative dyspnea  Cards:  negative for chest pain, palpitations, lower extremity edema  GI:   See HPI  :  negative for frequency, dysuria  Integument:  negative for rash and pruritus  Heme:  negative for easy bruising and gum/nose bleeding  Musculoskeletal:negative for myalgias, back pain and muscle weakness  Neuro:    negative for headaches, dizziness, vertigo  Psych: negative for feelings of anxiety, depression     Objective:     Patient Vitals for the past 8 hrs:   BP Temp Pulse Resp SpO2 Height Weight   08/19/20 0912 155/90  73 22 100 %     08/19/20 0700 159/83 98.3 °F (36.8 °C) 82 (!) 35 98 % 5' 3\" (1.6 m) 74.4 kg (164 lb 0.4 oz)     No intake/output data recorded. No intake/output data recorded. EXAM:     CONST:  Pleasant female appears uncomfortable, no distress   NEURO:  Alert and oriented x 3   HEENT: EOMI, no scleral icterus   LUNGS: No respiratory distress   CARD:  S1 S2   ABD:  Soft, non distended, mild epigastric tenderness, no rebound, no guarding. + Bowel sounds. EXT:  Warm   PSYCH: Full, not anxious or agitated     Data Review     Recent Labs     08/19/20  0713   WBC 7.3   HGB 12.1   HCT 37.1   *     Recent Labs     08/19/20  0713      K 3.7      CO2 24   BUN 13   CREA 0.74   GLU 96   CA 9.7     Recent Labs     08/19/20  0713   AP 70   TP 8.5*   ALB 4.2   GLOB 4.3*   LPSE 103     No results for input(s): INR, PTP, APTT, INREXT in the last 72 hours. Assessment:   · Epigastric abdominal pain: WBC 7.3, Hgb 12.1, platelets 651, normal LFTs, normal lipase. CT abdomen/pelvis with oral contrast (8/19/20): post gastric bypass with small hiatal hernia, no contrast within the excluded stomach; otherwise, no acute abnormality. RUQ ultrasound (8/19/20): no cholelithiasis or evidence of acute cholecystitis. · History of gastric bypass     Patient Active Problem List   Diagnosis Code    S/P gastric bypass Z98.84    Adjustment insomnia F51.02    Anemia D64.9    Masses of both breasts N63.10, N63.20    Class 1 obesity due to excess calories without serious comorbidity with body mass index (BMI) of 32.0 to 32.9 in adult E66.09, Z68.32    Short attention span R41.840     Plan:     · NPO  · PPI  · Supportive measures  · Plan for admission to the hospitalist  · Plan for EGD this afternoon with Dr. Krishna Barnes to evaluate for peptic ulcer disease/anastomotic ulcer. Details and risks of the procedure to include (but not limited to) anesthesia, bleeding, infection, and perforation were discussed.  Patient understands and is in agreement with the plan.    · Discussed with ED physician, Dr. Anne Marie Stout  · Patient was discussed with and will be seen by Dr. Jhoana Thomas  · Thank you for allowing me to participate in care of [facility]     Signed By: Ovidio Ricketts     8/19/2020  10:49 AM       Will cancel EGD   Discussed her case with Dr Redd Owen, he is operating on her today for internal hernia  Will follow

## 2020-08-20 LAB
ANION GAP SERPL CALC-SCNC: 8 MMOL/L (ref 5–15)
BUN SERPL-MCNC: 12 MG/DL (ref 6–20)
BUN/CREAT SERPL: 14 (ref 12–20)
CALCIUM SERPL-MCNC: 8.1 MG/DL (ref 8.5–10.1)
CHLORIDE SERPL-SCNC: 103 MMOL/L (ref 97–108)
CO2 SERPL-SCNC: 22 MMOL/L (ref 21–32)
CREAT SERPL-MCNC: 0.84 MG/DL (ref 0.55–1.02)
ERYTHROCYTE [DISTWIDTH] IN BLOOD BY AUTOMATED COUNT: 14.6 % (ref 11.5–14.5)
GLUCOSE SERPL-MCNC: 113 MG/DL (ref 65–100)
HCT VFR BLD AUTO: 31.8 % (ref 35–47)
HGB BLD-MCNC: 10.5 G/DL (ref 11.5–16)
LACTATE SERPL-SCNC: 0.8 MMOL/L (ref 0.4–2)
MAGNESIUM SERPL-MCNC: 2 MG/DL (ref 1.6–2.4)
MCH RBC QN AUTO: 26.9 PG (ref 26–34)
MCHC RBC AUTO-ENTMCNC: 33 G/DL (ref 30–36.5)
MCV RBC AUTO: 81.3 FL (ref 80–99)
NRBC # BLD: 0 K/UL (ref 0–0.01)
NRBC BLD-RTO: 0 PER 100 WBC
PHOSPHATE SERPL-MCNC: 3.7 MG/DL (ref 2.6–4.7)
PLATELET # BLD AUTO: 370 K/UL (ref 150–400)
PMV BLD AUTO: 10.1 FL (ref 8.9–12.9)
POTASSIUM SERPL-SCNC: 3.9 MMOL/L (ref 3.5–5.1)
RBC # BLD AUTO: 3.91 M/UL (ref 3.8–5.2)
SODIUM SERPL-SCNC: 133 MMOL/L (ref 136–145)
WBC # BLD AUTO: 15.5 K/UL (ref 3.6–11)

## 2020-08-20 PROCEDURE — 74011250637 HC RX REV CODE- 250/637: Performed by: SURGERY

## 2020-08-20 PROCEDURE — 36415 COLL VENOUS BLD VENIPUNCTURE: CPT

## 2020-08-20 PROCEDURE — 84100 ASSAY OF PHOSPHORUS: CPT

## 2020-08-20 PROCEDURE — 74011000258 HC RX REV CODE- 258: Performed by: SURGERY

## 2020-08-20 PROCEDURE — 74011250636 HC RX REV CODE- 250/636: Performed by: SURGERY

## 2020-08-20 PROCEDURE — 83605 ASSAY OF LACTIC ACID: CPT

## 2020-08-20 PROCEDURE — 74011250637 HC RX REV CODE- 250/637: Performed by: NURSE PRACTITIONER

## 2020-08-20 PROCEDURE — 65270000029 HC RM PRIVATE

## 2020-08-20 PROCEDURE — 74011000250 HC RX REV CODE- 250: Performed by: SURGERY

## 2020-08-20 PROCEDURE — 83735 ASSAY OF MAGNESIUM: CPT

## 2020-08-20 PROCEDURE — 85027 COMPLETE CBC AUTOMATED: CPT

## 2020-08-20 PROCEDURE — 80048 BASIC METABOLIC PNL TOTAL CA: CPT

## 2020-08-20 RX ORDER — OXYCODONE AND ACETAMINOPHEN 5; 325 MG/1; MG/1
2 TABLET ORAL
Status: DISCONTINUED | OUTPATIENT
Start: 2020-08-20 | End: 2020-08-22 | Stop reason: HOSPADM

## 2020-08-20 RX ADMIN — BUPROPION HYDROCHLORIDE 75 MG: 75 TABLET, FILM COATED ORAL at 17:30

## 2020-08-20 RX ADMIN — BENZOCAINE AND MENTHOL 1 LOZENGE: 15; 3.6 LOZENGE ORAL at 20:26

## 2020-08-20 RX ADMIN — OXYCODONE HYDROCHLORIDE AND ACETAMINOPHEN 2 TABLET: 5; 325 TABLET ORAL at 16:22

## 2020-08-20 RX ADMIN — ENOXAPARIN SODIUM 40 MG: 40 INJECTION SUBCUTANEOUS at 08:48

## 2020-08-20 RX ADMIN — DIPHENHYDRAMINE HYDROCHLORIDE 25 MG: 25 CAPSULE ORAL at 21:25

## 2020-08-20 RX ADMIN — HYDROMORPHONE HYDROCHLORIDE 1 MG: 1 INJECTION, SOLUTION INTRAMUSCULAR; INTRAVENOUS; SUBCUTANEOUS at 02:00

## 2020-08-20 RX ADMIN — KETOROLAC TROMETHAMINE 15 MG: 30 INJECTION, SOLUTION INTRAMUSCULAR at 17:30

## 2020-08-20 RX ADMIN — BENZOCAINE AND MENTHOL 1 LOZENGE: 15; 3.6 LOZENGE ORAL at 05:36

## 2020-08-20 RX ADMIN — Medication 10 ML: at 13:55

## 2020-08-20 RX ADMIN — CEFOTETAN DISODIUM 2 G: 2 INJECTION, POWDER, FOR SOLUTION INTRAMUSCULAR; INTRAVENOUS at 20:28

## 2020-08-20 RX ADMIN — OXYCODONE HYDROCHLORIDE AND ACETAMINOPHEN 2 TABLET: 5; 325 TABLET ORAL at 20:26

## 2020-08-20 RX ADMIN — BUPROPION HYDROCHLORIDE 75 MG: 75 TABLET, FILM COATED ORAL at 08:48

## 2020-08-20 RX ADMIN — KETOROLAC TROMETHAMINE 15 MG: 30 INJECTION, SOLUTION INTRAMUSCULAR at 11:48

## 2020-08-20 RX ADMIN — OXYCODONE HYDROCHLORIDE AND ACETAMINOPHEN 2 TABLET: 5; 325 TABLET ORAL at 11:48

## 2020-08-20 RX ADMIN — HYDROMORPHONE HYDROCHLORIDE 1 MG: 1 INJECTION, SOLUTION INTRAMUSCULAR; INTRAVENOUS; SUBCUTANEOUS at 08:48

## 2020-08-20 RX ADMIN — CEFOTETAN DISODIUM 2 G: 2 INJECTION, POWDER, FOR SOLUTION INTRAMUSCULAR; INTRAVENOUS at 09:51

## 2020-08-20 RX ADMIN — KETOROLAC TROMETHAMINE 15 MG: 30 INJECTION, SOLUTION INTRAMUSCULAR at 05:36

## 2020-08-20 RX ADMIN — BENZOCAINE AND MENTHOL 1 LOZENGE: 15; 3.6 LOZENGE ORAL at 11:57

## 2020-08-20 NOTE — PROGRESS NOTES
Bedside and Verbal shift change report given to Clarisa Short RN (oncoming nurse) by Sandra Huynh RN (offgoing nurse). Report included the following information SBAR.

## 2020-08-20 NOTE — PROGRESS NOTES
TRANSFER - IN REPORT:    Verbal report received from Neri Long RN(name) on [employer]  being received from [place]) for routine progression of care      Report consisted of patients Situation, Background, Assessment and   Recommendations(SBAR). Information from the following report(s) SBAR was reviewed with the receiving nurse. Opportunity for questions and clarification was provided. Assessment completed upon patients arrival to unit and care assumed.        Primary Nurse Zach Otero and Ady Horvath RN performed a dual skin assessment on this patient No impairment noted  Steven score is 20

## 2020-08-20 NOTE — PROGRESS NOTES
[address] [facility] at [facility]    POD #1    Subjective     Doing well, pain much better, no N/V    Objective     Patient Vitals for the past 24 hrs:   Temp Pulse Resp BP SpO2   08/20/20 0158 98.3 °F (36.8 °C) 81 14 114/63 98 %   08/20/20 0047 97.8 °F (36.6 °C) 91 14 126/81 98 %   08/19/20 2329 98.1 °F (36.7 °C) 83 14 121/69 98 %   08/19/20 2231 97.5 °F (36.4 °C) 88 14 116/66 97 %   08/19/20 2127 98.1 °F (36.7 °C) 77 14 128/61 97 %   08/19/20 2050  77 13  97 %   08/19/20 2045  92 21 141/65 100 %   08/19/20 2040  99 20  100 %   08/19/20 2030  95 18 139/63 100 %   08/19/20 2015  69 11 130/73 99 %   08/19/20 2007 97.8 °F (36.6 °C)       08/19/20 2000  96 26 124/60 98 %   08/19/20 1950  79 22 121/49 99 %   08/19/20 1945  79 17 126/48 99 %   08/19/20 1940  81 11 126/52 98 %   08/19/20 1936 98.1 °F (36.7 °C) 73 10 120/48 99 %   08/19/20 1935  76 12 120/48 99 %   08/19/20 1933  91 14  99 %   08/19/20 1932     100 %   08/19/20 1415 98.3 °F (36.8 °C) 65 20 (!) 164/102 100 %   08/19/20 1139 98.3 °F (36.8 °C) 78 25 157/87 100 %   08/19/20 0912  73 22 155/90 100 %       Date 08/19/20 0700 - 08/20/20 0659 08/20/20 0700 - 08/21/20 0659   Shift 0994-4873 3631-0030 24 Hour Total 5302-1009 6194-2748 24 Hour Total   INTAKE   I.V.(mL/kg/hr) 1050(1.2) 1625(1.8) 2675(1.5)        I.V.  1625 1625        Volume (sodium chloride 0.9 % bolus infusion 1,000 mL) 1000  1000        Volume (promethazine (PHENERGAN) 25 mg in NS IVPB) 50  50      Shift Total(mL/kg) 1050(14.1) 1625(21.8) 0973(21)      OUTPUT   Urine(mL/kg/hr) 750(0.8) 550(0.6) 1300(0.7)        Urine Voided  300 300        Urine Occurrence(s)  1 x 1 x        Urine Output       Blood  25 25        Estimated Blood Loss  25 25      Shift Total(mL/kg) 750(10.1) 575(7.7) 1325(17.8)       1050 1350      Weight (kg) 74.4 74.4 74.4 74.4 74.4 74.4       PE  Pulm - CTAB  CV - RRR  Abd - soft, ND, BS present, NTTP, incisions c//di    Labs  Recent Results (from the past 12 hour(s))   METABOLIC PANEL, BASIC    Collection Time: 08/20/20  2:07 AM   Result Value Ref Range    Sodium 133 (L) 136 - 145 mmol/L    Potassium 3.9 3.5 - 5.1 mmol/L    Chloride 103 97 - 108 mmol/L    CO2 22 21 - 32 mmol/L    Anion gap 8 5 - 15 mmol/L    Glucose 113 (H) 65 - 100 mg/dL    BUN 12 6 - 20 MG/DL    Creatinine 0.84 0.55 - 1.02 MG/DL    BUN/Creatinine ratio 14 12 - 20      GFR est AA >60 >60 ml/min/1.73m2    GFR est non-AA >60 >60 ml/min/1.73m2    Calcium 8.1 (L) 8.5 - 10.1 MG/DL   CBC W/O DIFF    Collection Time: 08/20/20  2:07 AM   Result Value Ref Range    WBC 15.5 (H) 3.6 - 11.0 K/uL    RBC 3.91 3.80 - 5.20 M/uL    HGB 10.5 (L) 11.5 - 16.0 g/dL    HCT 31.8 (L) 35.0 - 47.0 %    MCV 81.3 80.0 - 99.0 FL    MCH 26.9 26.0 - 34.0 PG    MCHC 33.0 30.0 - 36.5 g/dL    RDW 14.6 (H) 11.5 - 14.5 %    PLATELET 410 157 - 706 K/uL    MPV 10.1 8.9 - 12.9 FL    NRBC 0.0 0  WBC    ABSOLUTE NRBC 0.00 0.00 - 0.01 K/uL   MAGNESIUM    Collection Time: 08/20/20  2:07 AM   Result Value Ref Range    Magnesium 2.0 1.6 - 2.4 mg/dL   PHOSPHORUS    Collection Time: 08/20/20  2:07 AM   Result Value Ref Range    Phosphorus 3.7 2.6 - 4.7 MG/DL   LACTIC ACID    Collection Time: 08/20/20  2:07 AM   Result Value Ref Range    Lactic acid 0.8 0.4 - 2.0 MMOL/L         Assessment     Cadenadrianjadon NAYE Michelle Solis is a 38 y. o.yr old female s/p laparoscopic repair of internal hernia and small bowel resection    Plan     Doing well  Start clears today  PO pain meds  OOB more today      Stacey Barnes MD

## 2020-08-20 NOTE — PROGRESS NOTES
[address]  [facility], [address]       GI PROGRESS NOTE  Will Holley Lisachens  [phone] office  [phone] NP/PA in-hospital cell phone M-F until 4:30PM  After 5PM or on weekends, please call  for physician on call      NAME: Jadyn Jeong   :  1985   MRN:  [id number]       Subjective:   Patient is sitting on the edge of the bed. She reports significant improvement in abdominal pain. No nausea or vomiting. She is tolerating clear liquids. Objective:     VITALS:   Last 24hrs VS reviewed since prior progress note. Most recent are:  Visit Vitals  /72   Pulse 76   Temp 98.2 °F (36.8 °C)   Resp 14   Ht 5' 3\" (1.6 m)   Wt 74.4 kg (164 lb 0.4 oz)   SpO2 97%   BMI 29.06 kg/m²       PHYSICAL EXAM:  General: Cooperative, no acute distress    Neurologic:  Alert and oriented X 3  HEENT: EOMI, no scleral icterus   Lungs:  No respiratory distress  Heart:  S1 S2  Abdomen: Soft, mildly distended, appropriate tenderness, no guarding, no rebound. +Bowel sounds. Extremities: Warm  Psych:   Good insight. Not anxious or agitated.     Lab Data Reviewed:     Recent Results (from the past 24 hour(s))   METABOLIC PANEL, BASIC    Collection Time: 20  2:07 AM   Result Value Ref Range    Sodium 133 (L) 136 - 145 mmol/L    Potassium 3.9 3.5 - 5.1 mmol/L    Chloride 103 97 - 108 mmol/L    CO2 22 21 - 32 mmol/L    Anion gap 8 5 - 15 mmol/L    Glucose 113 (H) 65 - 100 mg/dL    BUN 12 6 - 20 MG/DL    Creatinine 0.84 0.55 - 1.02 MG/DL    BUN/Creatinine ratio 14 12 - 20      GFR est AA >60 >60 ml/min/1.73m2    GFR est non-AA >60 >60 ml/min/1.73m2    Calcium 8.1 (L) 8.5 - 10.1 MG/DL   CBC W/O DIFF    Collection Time: 20  2:07 AM   Result Value Ref Range    WBC 15.5 (H) 3.6 - 11.0 K/uL    RBC 3.91 3.80 - 5.20 M/uL    HGB 10.5 (L) 11.5 - 16.0 g/dL    HCT 31.8 (L) 35.0 - 47.0 %    MCV 81.3 80.0 - 99.0 FL    MCH 26.9 26.0 - 34.0 PG    MCHC 33.0 30.0 - 36.5 g/dL    RDW 14.6 (H) 11.5 - 14.5 %    PLATELET 014 163 - 743 K/uL    MPV 10.1 8.9 - 12.9 FL    NRBC 0.0 0  WBC    ABSOLUTE NRBC 0.00 0.00 - 0.01 K/uL   MAGNESIUM    Collection Time: 08/20/20  2:07 AM   Result Value Ref Range    Magnesium 2.0 1.6 - 2.4 mg/dL   PHOSPHORUS    Collection Time: 08/20/20  2:07 AM   Result Value Ref Range    Phosphorus 3.7 2.6 - 4.7 MG/DL   LACTIC ACID    Collection Time: 08/20/20  2:07 AM   Result Value Ref Range    Lactic acid 0.8 0.4 - 2.0 MMOL/L        Assessment:   · Abdominal pain: status post diagnostic laparoscopy, repair of internal hernia, and small bowel resection (8/20/20). · History of gastric bypass     Patient Active Problem List   Diagnosis Code    S/P gastric bypass Z98.84    Adjustment insomnia F51.02    Anemia D64.9    Masses of both breasts N63.10, N63.20    Class 1 obesity due to excess calories without serious comorbidity with body mass index (BMI) of 32.0 to 32.9 in adult E66.09, Z68.32    Short attention span R41.840    Abdominal pain R10.9    N&V (nausea and vomiting) R11.2    Internal hernia K45.8     Plan:   · Postoperative management per surgery   · We will be available again as needed. Please call us with any questions. Thank you.       Signed By: HEATH Alexander     8/20/2020  1:57 PM         Agree with above  Will sign off

## 2020-08-20 NOTE — H&P
[address] [facility] Adult  Hospitalist Group  History and Physical    Primary Care Provider: John Perez MD  Date of Service:  8/19/2020    Subjective:     Jenna Dillard is a 38 y.o. female with PMH of gastric sleeve surgery 5567, complicated by stricture development and subsequently converted to gastric bypass, started to have upper abdominal pain across the whole stretch radiating towards back around 4 AM.  Patient also had dry heaving at that time with frothy sputum/vomitus but not anything else. Patient tried taking laxative to help release of gas and bowel movement. Initially she had loose runny stool and also vomited, in which she felt she threw up the medication laxative as well. Subsequently she has not been passing any gas and not able to have any comfort with her pain. Her pain is excruciating, by father severe most experience, continuous, across the whole upper abdomen and radiating towards back, and also over the whole bellygeneralized discomfort. Patient was restless and not able to lay down or sit up during my assessment and had been standing up and bending forward on the bed to get some relief, unable to pass some gas but unsuccessful in getting comfort. The pain did not get any better despite of significant amount of pain medication/narcotics as well. No other concerns or complaints. Patient denied any chest pain, shortness of breath, palpitation, dizziness, lightheadedness, leg swelling, blood in stool or urine, blood in vomitus, focal weakness, tingling or numbness. In the ED, patient was evaluated and noted to have normal CBC, CMP and urine analysis. CT abdomen pelvis without contrast showed post gastric bypass with small hiatal hernia but otherwise no acute abnormality. Limited ultrasound of the abdomen showed no cholelithiasis or evidence of acute cholecystitis. EKG showed sinus rhythm with marked sinus arrhythmia but no acute ST-T changes.   GI has evaluated the patient in the ED before me and patient is n.p.o. with a plan to proceed with EGD later this afternoon. Review of Systems:    A comprehensive review of systems was negative except for that written in the History of Present Illness. Past Medical History:   Diagnosis Date    Anemia     Nausea & vomiting     Transient elevated blood pressure       Past Surgical History:   Procedure Laterality Date    ABDOMEN SURGERY PROC UNLISTED      gastric bypass    HX BREAST BIOPSY Bilateral     not sure of dates one  not sure of other date    HX  SECTION  9/10    HX DILATION AND CURETTAGE      HX OTHER SURGICAL      gastric banding. Prior to Admission medications    Medication Sig Start Date End Date Taking? Authorizing Provider   albuterol (PROVENTIL HFA, VENTOLIN HFA, PROAIR HFA) 90 mcg/actuation inhaler Take 1 Puff by inhalation every four (4) hours as needed for Wheezing. 20   Anat Jason MD   varenicline (CHANTIX STARTER LO) 0.5 mg (11)- 1 mg (42) DsPk As directed one package 20   Anat Jason MD   naproxen (NAPROSYN) 500 mg tablet Take 1 Tab by mouth two (2) times daily as needed for Pain. 3/5/20   Escobar Maza MD   doxylamine succinate (SLEEP AID, DOXYLAMINE, PO) Take  by mouth. Provider, Historical   buPROPion (WELLBUTRIN) 75 mg tablet Take one tab daily for the first week and then BID afterwards. Patient taking differently: Take 75 mg by mouth two (2) times a day. Take one tab daily for the first week and then BID afterwards.  1/3/20   Malini Maza MD     Allergies   Allergen Reactions    Seafood [Iodine And Iodide Containing Products] Hives    Seafood [Shrimp] Hives      Family History   Problem Relation Age of Onset    Hypertension Mother     Diabetes Mother     Hypertension Father     Diabetes Father     Cancer Father         lung    Hypertension Brother     Other Brother     Hypertension Sister     Breast Cancer Paternal Grandmother         age unknown        SOCIAL HISTORY:  Patient resides at Home. Patient ambulates with no assistance. Smoking history: Denied any smoking  Alcohol history: None        Objective:       Physical Exam:   General:          Alert, cooperative, severe distress, restless in an effort to get some comfortable posture/position, running around in room, appears stated age. HEENT:           Atraumatic, anicteric sclerae, pink conjunctivae                          No oral ulcers, mucosa dry, coated tongue, throat clear, dentition fair  Neck:               Supple, symmetrical  Lungs:             Clear to auscultation bilaterally. No Wheezing or Rhonchi. No rales. Chest wall:      No tenderness  No Accessory muscle use. Heart:              Regular  rhythm,  No  murmur   No edema  Abdomen:        Soft, tender with guarding on little deeper palpation all over the abdomen, most prominent in both upper quadrants and also in the right lower quadrant, not distended. Bowel sounds very feeble in left lower quadrant but otherwise minimal pain other 3 quadrants  Extremities:     No cyanosis. No clubbing,                            Skin turgor normal, Capillary refill normal  Skin:                Not pale. Not Jaundiced  No rashes   Psych:             Not anxious or agitated. Neurologic:      Alert, moves all extremities, answers questions appropriately and responds to commands     Cap refill: Brisk, less than 3 seconds  Pulses: 2+, symmetric in all extremities    ECG:       Data Review: All diagnostic labs and studies have been reviewed. Radiology  Ct Abd Pelv Wo Cont    Result Date: 8/19/2020  IMPRESSION: 1. Post gastric bypass with small hiatal hernia. Otherwise no acute abnormality    [employer]    Result Date: 8/19/2020  IMPRESSION: No cholelithiasis or evidence of acute cholecystitis.      Recent Results (from the past 24 hour(s))   SAMPLES BEING HELD    Collection Time: 08/19/20  7:13 AM   Result Value Ref Range    SAMPLES BEING HELD 1blu,1red,1lav,pst,1UA,1UC     COMMENT        Add-on orders for these samples will be processed based on acceptable specimen integrity and analyte stability, which may vary by analyte. CBC WITH AUTOMATED DIFF    Collection Time: 08/19/20  7:13 AM   Result Value Ref Range    WBC 7.3 3.6 - 11.0 K/uL    RBC 4.51 3.80 - 5.20 M/uL    HGB 12.1 11.5 - 16.0 g/dL    HCT 37.1 35.0 - 47.0 %    MCV 82.3 80.0 - 99.0 FL    MCH 26.8 26.0 - 34.0 PG    MCHC 32.6 30.0 - 36.5 g/dL    RDW 14.9 (H) 11.5 - 14.5 %    PLATELET 951 (H) 250 - 400 K/uL    MPV 10.6 8.9 - 12.9 FL    NRBC 0.0 0  WBC    ABSOLUTE NRBC 0.00 0.00 - 0.01 K/uL    NEUTROPHILS 75 32 - 75 %    LYMPHOCYTES 14 12 - 49 %    MONOCYTES 10 5 - 13 %    EOSINOPHILS 0 0 - 7 %    BASOPHILS 1 0 - 1 %    IMMATURE GRANULOCYTES 0 0.0 - 0.5 %    ABS. NEUTROPHILS 5.5 1.8 - 8.0 K/UL    ABS. LYMPHOCYTES 1.0 0.8 - 3.5 K/UL    ABS. MONOCYTES 0.7 0.0 - 1.0 K/UL    ABS. EOSINOPHILS 0.0 0.0 - 0.4 K/UL    ABS. BASOPHILS 0.0 0.0 - 0.1 K/UL    ABS. IMM. GRANS. 0.0 0.00 - 0.04 K/UL    DF AUTOMATED     METABOLIC PANEL, COMPREHENSIVE    Collection Time: 08/19/20  7:13 AM   Result Value Ref Range    Sodium 136 136 - 145 mmol/L    Potassium 3.7 3.5 - 5.1 mmol/L    Chloride 104 97 - 108 mmol/L    CO2 24 21 - 32 mmol/L    Anion gap 8 5 - 15 mmol/L    Glucose 96 65 - 100 mg/dL    BUN 13 6 - 20 MG/DL    Creatinine 0.74 0.55 - 1.02 MG/DL    BUN/Creatinine ratio 18 12 - 20      GFR est AA >60 >60 ml/min/1.73m2    GFR est non-AA >60 >60 ml/min/1.73m2    Calcium 9.7 8.5 - 10.1 MG/DL    Bilirubin, total 0.4 0.2 - 1.0 MG/DL    ALT (SGPT) 33 12 - 78 U/L    AST (SGOT) 28 15 - 37 U/L    Alk.  phosphatase 70 45 - 117 U/L    Protein, total 8.5 (H) 6.4 - 8.2 g/dL    Albumin 4.2 3.5 - 5.0 g/dL    Globulin 4.3 (H) 2.0 - 4.0 g/dL    A-G Ratio 1.0 (L) 1.1 - 2.2     LIPASE    Collection Time: 08/19/20  7:13 AM   Result Value Ref Range    Lipase 103 73 - 393 U/L   URINALYSIS W/MICROSCOPIC    Collection Time: 08/19/20  7:13 AM   Result Value Ref Range    Color YELLOW/STRAW      Appearance CLEAR CLEAR      Specific gravity 1.018 1.003 - 1.030      pH (UA) >8.5 (H) 5.0 - 8.0    Protein Negative NEG mg/dL    Glucose Negative NEG mg/dL    Ketone 40 (A) NEG mg/dL    Bilirubin Negative NEG      Blood Negative NEG      Urobilinogen 1.0 0.2 - 1.0 EU/dL    Nitrites Negative NEG      Leukocyte Esterase Negative NEG      WBC 0-4 0 - 4 /hpf    RBC 0-5 0 - 5 /hpf    Epithelial cells FEW FEW /lpf    Bacteria 1+ (A) NEG /hpf   URINE CULTURE HOLD SAMPLE    Collection Time: 08/19/20  7:13 AM    Specimen: Serum; Urine   Result Value Ref Range    Urine culture hold        Urine on hold in Microbiology dept for 2 days. If unpreserved urine is submitted, it cannot be used for addtional testing after 24 hours, recollection will be required. HCG URINE, QL. - POC    Collection Time: 08/19/20  7:21 AM   Result Value Ref Range    Pregnancy test,urine (POC) Negative NEG     EKG, 12 LEAD, INITIAL    Collection Time: 08/19/20 12:46 PM   Result Value Ref Range    Ventricular Rate 71 BPM    Atrial Rate 71 BPM    P-R Interval 160 ms    QRS Duration 80 ms    Q-T Interval 422 ms    QTC Calculation (Bezet) 458 ms    Calculated P Axis 67 degrees    Calculated R Axis 37 degrees    Calculated T Axis 40 degrees    Diagnosis       Sinus rhythm with marked sinus arrhythmia  When compared with ECG of 25-JUL-2013 16:18,  No significant change was found  Confirmed by Ethel Danielle M.D., Lucillie Downing ([id number]) on 8/19/2020 1:27:59 PM           Assessment:     Active Problems:    Abdominal pain (8/19/2020)      N&V (nausea and vomiting) (8/19/2020)      Internal hernia (8/19/2020)      # Severe abdominal pain, unresponsive to narcotics, disproportionate with physical findings. Although work-up is unremarkable, concern for acute surgical abdomen,? Adhesions from previous surgery causing anatomical disturbance? Bowel ischemia? Intussusception? Internal hernia? Especially in the light of gastric bypass surgery  # Vomiting  # Obstipation  # Overweight, with history of morbid obesity, s/p gastric bypass surgery done post gastric sleeve surgery complicated by stricture, in 2013    Plan:     -Admit to inpatient, telemetry, ALOS greater than 2 MN  -Stat general surgery consultation. D/W with ER physician Dr. Bobby Dolan. He has been gracious enough to call the consult stat for general surgical evaluation  -N.p.o.  -Continue IV fluidsalready on LR at 125 mL/h  -GI on board. Appreciate. D/W with Dr. Ángel Calvert about the same and made aware about the concerns.  -Add on lactic acid  -Follow CBC, CMP, lactic acid in a.m.  -Pain management and supportive care  -May consider IV antibiotics, but no clear indication at present, except emperipolesis prophylactic. Will wait for GI/ GNS consultation recommendations  -Consider soapsuds enema/mineral oil enema as a trial to facilitate bowel emptying as per patient's request will give something for bowel movement facilitation/flatulence facilitation. Counseled patient to avoid any oral stool softener or bowel routines with high risk of complications if any undiagnosed unexpected surgical pathologies if we are dealing with. Patient voiced understanding and agreeable. Sick patient. However being young and otherwise healthy, mysteriously obscure findings on diagnostics. Really appreciate other specialist expertise and comanagement.     CODE STATUS: Full code  DVT prophylaxis: SCDs given possibility of endoscopy/surgical intervention    FUNCTIONAL STATUS PRIOR TO HOSPITALIZATION Ambulates Independently (including history of recent falls)     Signed By: Geneva Rios MD     August 19, 2020

## 2020-08-20 NOTE — PERIOP NOTES
TRANSFER - OUT REPORT:    Verbal report given to [facility] on Vielka Max  being transferred to 12 for routine post - op       Report consisted of patients Situation, Background, Assessment and   Recommendations(SBAR). Time Pre op antibiotic given:1637  Anesthesia Stop time: 1937  Riuz Present on Transfer to floor:no  Order for Ruiz on Chart:no  Discharge Prescriptions with Chart:no    Information from the following report(s) SBAR, OR Summary, Intake/Output and MAR was reviewed with the receiving nurse. Opportunity for questions and clarification was provided. Is the patient on 02? NO       L/Min        Other     Is the patient on a monitor? NO    Is the nurse transporting with the patient? NO    Surgical Waiting Area notified of patient's transfer from PACU? NO      The following personal items collected during your admission accompanied patient upon transfer:   Dental Appliance:    Vision: Visual Aid: None  Hearing Aid:    Jewelry:    Clothing: Clothing: (1 small plastic bag with clothes returned)  Other Valuables:    Valuables sent to safe:      Call to pt's spouse NICOLE. Pt spoke to him on phone. Notified she is going to room 560.

## 2020-08-20 NOTE — PROGRESS NOTES
[address] [facility] Adult  Hospitalist Group                                                                                          Hospitalist Progress Note  Felice Estrada MD  Answering service: [phone] OR [phone] from in house phone        Date of Service:  2020  NAME:  Nilesh Londono  :  1985  MRN:  [id number]      Admission Summary:    38 y.o. female with PMH of gastric sleeve surgery 3272, complicated by stricture development and subsequently converted to gastric bypass came to the hospital because of abdominal pain. Interval history / Subjective:     Patient seen and examined    States that she is feeling better compared to yesterday. Has expected post OP abdominal pain. Has some throat irritation likely due to recent intubation     Assessment & Plan:     # Internal hernia s/p diagnostic laparoscopy-repair of hernia and small bowel resection  -on clear liquid diet  -Gen surg following. -Abx per primary team  -white count high today,likely due to post Op inflmmatorry process    #h/o gastric bypass surgery    #Chronic tobacco use:  -counseled to quit    Code status: full  DVT prophylaxis: [place] 38 discussed with: patient,nurse  Anticipated Disposition: home  Anticipated Discharge: 1-2 days     Hospital Problems  Date Reviewed: 2020          Codes Class Noted POA    Abdominal pain ICD-10-CM: R10.9  ICD-9-CM: 789.00  2020 Unknown        N&V (nausea and vomiting) ICD-10-CM: R11.2  ICD-9-CM: 787.01  2020 Unknown        Internal hernia ICD-10-CM: K45.8  ICD-9-CM: 553.8  2020 Unknown                Review of Systems:   As per HPI       Vital Signs:    Last 24hrs VS reviewed since prior progress note.  Most recent are:  Visit Vitals  /60   Pulse 78   Temp 98 °F (36.7 °C)   Resp 16   Ht 5' 3\" (1.6 m)   Wt 74.4 kg (164 lb 0.4 oz)   SpO2 97%   BMI 29.06 kg/m²         Intake/Output Summary (Last 24 hours) at 2020 190  Last data filed at 2020 2347  Gross per 24 hour   Intake 1625 ml   Output 575 ml   Net 1050 ml        Physical Examination:             Constitutional:  No acute distress, cooperative, pleasant    ENT:  Oral mucosa moist, oropharynx benign. Resp:  CTA bilaterally. No wheezing/rhonchi/rales. No accessory muscle use   CV:  Regular rhythm, normal rate, no murmurs,    GI:  Soft, non distended, dry dressing on the abdomen and some tenderness +     Musculoskeletal:  No edema, warm, 2+ pulses throughout    Neurologic:  Moves all extremities. AAOx3, CN II-XII reviewed     Psych:  Good insight, Not anxious nor agitated. Data Review:    Review and/or order of clinical lab test  Review and/or order of tests in the radiology section of CPT  Review and/or order of tests in the medicine section of CPT      Labs:     Recent Labs     08/20/20 0207 08/19/20 0713   WBC 15.5* 7.3   HGB 10.5* 12.1   HCT 31.8* 37.1    423*     Recent Labs     08/20/20 0207 08/19/20 0713   * 136   K 3.9 3.7    104   CO2 22 24   BUN 12 13   CREA 0.84 0.74   * 96   CA 8.1* 9.7   MG 2.0  --    PHOS 3.7  --      Recent Labs     08/19/20  0713   ALT 33   AP 70   TBILI 0.4   TP 8.5*   ALB 4.2   GLOB 4.3*   LPSE 103     No results for input(s): INR, PTP, APTT, INREXT in the last 72 hours. No results for input(s): FE, TIBC, PSAT, FERR in the last 72 hours. Lab Results   Component Value Date/Time    Folate 8.0 08/18/2017 06:25 AM      No results for input(s): PH, PCO2, PO2 in the last 72 hours. No results for input(s): CPK, CKNDX, TROIQ in the last 72 hours.     No lab exists for component: CPKMB  Lab Results   Component Value Date/Time    Cholesterol, total 172 01/29/2020 09:55 AM    HDL Cholesterol 77 01/29/2020 09:55 AM    LDL, calculated 85 01/29/2020 09:55 AM    Triglyceride 48 01/29/2020 09:55 AM    CHOL/HDL Ratio 3.0 10/28/2009 04:12 PM     Lab Results   Component Value Date/Time    Glucose (POC) 131 (H) 08/03/2013 05:01 PM    Glucose (POC) 141 (H) 08/03/2013 12:14 PM    Glucose (POC) 136 (H) 08/03/2013 06:11 AM    Glucose (POC) 209 (H) 08/02/2013 11:52 PM    Glucose (POC) 165 (H) 08/02/2013 06:30 PM     Lab Results   Component Value Date/Time    Color YELLOW/STRAW 08/19/2020 07:13 AM    Appearance CLEAR 08/19/2020 07:13 AM    Specific gravity 1.018 08/19/2020 07:13 AM    pH (UA) >8.5 (H) 08/19/2020 07:13 AM    Protein Negative 08/19/2020 07:13 AM    Glucose Negative 08/19/2020 07:13 AM    Ketone 40 (A) 08/19/2020 07:13 AM    Bilirubin Negative 08/19/2020 07:13 AM    Urobilinogen 1.0 08/19/2020 07:13 AM    Nitrites Negative 08/19/2020 07:13 AM    Leukocyte Esterase Negative 08/19/2020 07:13 AM    Epithelial cells FEW 08/19/2020 07:13 AM    Bacteria 1+ (A) 08/19/2020 07:13 AM    WBC 0-4 08/19/2020 07:13 AM    RBC 0-5 08/19/2020 07:13 AM         Medications Reviewed:     Current Facility-Administered Medications   Medication Dose Route Frequency    benzocaine-menthoL (CEPACOL) lozenge 1 Lozenge  1 Lozenge Mucous Membrane PRN    oxyCODONE-acetaminophen (PERCOCET) 5-325 mg per tablet 2 Tab  2 Tab Oral Q4H PRN    buPROPion (WELLBUTRIN) tablet 75 mg  75 mg Oral BID    lactated Ringers infusion  75 mL/hr IntraVENous CONTINUOUS    sodium chloride (NS) flush 5-40 mL  5-40 mL IntraVENous Q8H    sodium chloride (NS) flush 5-40 mL  5-40 mL IntraVENous PRN    HYDROmorphone (PF) (DILAUDID) injection 1 mg  1 mg IntraVENous Q3H PRN    naloxone (NARCAN) injection 0.4 mg  0.4 mg IntraVENous PRN    ondansetron (ZOFRAN) injection 4 mg  4 mg IntraVENous Q4H PRN    diphenhydrAMINE (BENADRYL) injection 12.5 mg  12.5 mg IntraVENous ONCE PRN    enoxaparin (LOVENOX) injection 40 mg  40 mg SubCUTAneous Q24H    cefoTEtan (CEFOTAN) 2 g in 0.9% sodium chloride (MBP/ADV) 50 mL  2 g IntraVENous Q12H    albuterol (PROVENTIL VENTOLIN) nebulizer solution 2.5 mg  2.5 mg Nebulization Q4H PRN    diphenhydrAMINE (BENADRYL) capsule 25 mg  25 mg Oral QHS PRN ______________________________________________________________________  EXPECTED LENGTH OF STAY: 10d 9h  ACTUAL LENGTH OF STAY:          1                 Abhilash MD Irma

## 2020-08-20 NOTE — ANESTHESIA POSTPROCEDURE EVALUATION
Post-Anesthesia Evaluation and Assessment    Patient: Billee Holter MRN: [id number]  SSN: [id number]    YOB: 1985  Age: 38 y.o. Sex: female      I have evaluated the patient and they are stable and ready for discharge from the PACU. Cardiovascular Function/Vital Signs  Visit Vitals  /60   Pulse 96   Temp 36.7 °C (98.1 °F)   Resp 26   Ht 5' 3\" (1.6 m)   Wt 74.4 kg (164 lb 0.4 oz)   SpO2 98%   BMI 29.06 kg/m²       Patient is status post General anesthesia for Procedure(s):  DIAGNOSTIC LAPAROSCOPY/ REPAIR OF INTERNAL HERNIA/ SMALL BOWEL RESECTION. Nausea/Vomiting: None    Postoperative hydration reviewed and adequate. Pain:  Pain Scale 1: Numeric (0 - 10) (08/19/20 1932)  Pain Intensity 1: 0 (08/19/20 1932)   Managed    Neurological Status:   Neuro (WDL): Exceptions to WDL (08/19/20 1932)  Neuro  Neurologic State: Drowsy (08/19/20 1932)   At baseline    Mental Status, Level of Consciousness: Alert and  oriented to person, place, and time    Pulmonary Status:   O2 Device: Room air (08/19/20 1936)   Adequate oxygenation and airway patent    Complications related to anesthesia: None    Post-anesthesia assessment completed. No concerns    Signed By: Julia Jose MD     August 19, 2020              Procedure(s):  DIAGNOSTIC LAPAROSCOPY/ REPAIR OF INTERNAL HERNIA/ SMALL BOWEL RESECTION. general    <BSHSIANPOST>    INITIAL Post-op Vital signs:   Vitals Value Taken Time   /60 8/19/2020  8:00 PM   Temp 36.7 °C (98.1 °F) 8/19/2020  7:36 PM   Pulse 82 8/19/2020  8:07 PM   Resp 12 8/19/2020  8:07 PM   SpO2 98 % 8/19/2020  8:07 PM   Vitals shown include unvalidated device data.

## 2020-08-20 NOTE — OP NOTES
[address]   OPERATIVE REPORT    Name:  Dominic Hawkins  MR#:  [id number]  :  1985  ACCOUNT #:  [de-identified]  DATE OF SERVICE:  2020      PREOPERATIVE DIAGNOSIS:  Abdominal pain. POSTOPERATIVE DIAGNOSIS:  Internal hernia. PROCEDURES PERFORMED:  Diagnostic laparoscopy, repair of internal hernia, and small-bowel resection. SURGEON:  Amalia Doshi MD    ASSISTANT:  Uche Coronado SA    ANESTHESIA:  General.    COMPLICATIONS:  Small-bowel enterotomy repaired with the small-bowel resection. SPECIMENS REMOVED:  Portion of the small bowel. IMPLANTS:  None. ESTIMATED BLOOD LOSS:  Minimal.    DRAINS:  None. FINDINGS:  Internal hernia with all the common channel bowel into the retro Jhonatan or Rogers's space internal hernia, small bowel was reduced and then we repaired the internal hernia primarily. The distal small-bowel enterotomy was made by reducing the small bowel. This segment was resected, about 10 cm was resected with primary anastomosis. INDICATIONS FOR THE OPERATION:  The patient is a 73-year-old female who presents with an internal hernia based on CT scan and the patient has peritonitis and is needing laparoscopic exploration. DESCRIPTION OF THE OPERATION:  The patient was met in the preop holding area. The H and P was updated. Consent was signed. All risks and benefits were explained to the patient prior to the start of the operation. She was taken back to the operating room. She was lying in a supine position. The abdomen was prepped and draped in standard sterile fashion. Time-out was called. Antibiotics were given. SCDs were on lower extremities. Ruiz catheter had been inserted. Started the operation by making a 5-mm incision into the right upper quadrant and inserting a VisiPort trocar into the intra-abdominal cavity insufflating to 15 mmHg.   We then placed a 12-mm right lower quadrant trocar, 5-mm infraumbilical trocar, and a 5-mm left lower quadrant trocar. There was a small liver laceration about 2 mm in size that was cauterized and was not bleeding. We then could see that there did not appear to be any ischemic bowel noted but we then started running the small bowel, running from the terminal ileum. We started from the terminal ileum and ran that back towards the JJ anastomosis, noting that the bowel was pulling through very tight defect. All of the distal common channel had gone through this internal hernia defect of this internal hernia. It was very difficult to pull the bowel back. We were able to get it all the way reduced back into the normal side of the abdominal cavity. We found the JJ anastomosis. Everything was untwisted. The bowel was noted to be gray as we were pulling things back, but as we got things all the way back through, the bowel looked pink and completely viable. Everything looked really good. We then found the Jhonatan limb which had the same mesentery of the common channel all the way up to the [address] [facility] anastomosis, all that appeared to be just fine. We then ran the biliopancreatic limb to the ligament of Treitz and everything was untwisted there. We then repaired our internal hernia space at the retro Jhonatan Rogers's space defect which was rather large. We closed this with a running 2-0 nonabsorbable V-Loc suture in a running fashion with an internal hernia defect completely closed. We inspected the JJ anastomosis. The JJ anastomosis did not have any internal hernia. Everything there looked perfectly fine. We then ran the small bowel back and around the mid distal common channel, there was a small area where there was a small serosal tear which was oversewn with a 3-0 Vicryl suture and then one more just about maybe 20 or 30 cm distal to that, that was oversewn with a 3-0 Vicryl suture.   We then got back to maybe about 50 to 60 to 70 cm away from the terminal ileum where it joins the colon where I could see that there was a rather large enterotomy there in this location. The enterotomy was about 2 cm in size, it was large enough that I did not feel that repairing it with sutures alone would be adequate, so we would need to do a small-bowel resection. We looked and inspected all of the small bowel one more time with the serosal tear and the enterotomy that we had. Once we had inspected everything one more time, all of the Jhonatan limb, biliopancreatic limb, and common channel, we then made a small midline laparotomy about 2 inches in size and used a wound protector in through the wound and then brought up that little loop of small bowel and did a small-bowel resection with proximal and distal ends with a tan load Signia stapler and then did a side-to-side stapled anastomosis with a tan load Signia stapler and then closed the common enterotomy with a running 4-0 PDS suture and then closed the second layer of the common enterotomy with multiple interrupted 3-0 silk sutures and then closed the mesenteric defect between the 2 with a running silk suture as well. The anastomosis was widely patent and viable. We then dunked that back into the abdominal cavity, removed the wound protector, changed gloves and then closed the midline fascia with a running #1 single stranded PDS in a running  fashion. We irrigated the wound and then closed the 12-mm right lower quadrant trocar site with a 0 Vicryl suture and then we closed the skin with staples at all of the sites and then dry sterile dressing and then completed the operation. Dr. Mando Mathews was present and scrubbed during the entire operation. The counts were correct.       MD NELIDA [id number]  D:  08/19/2020 19:34  T:  08/20/2020 0:59  JOB #:  [id number]

## 2020-08-20 NOTE — PROGRESS NOTES
Bedside shift change report given to [facility] (oncoming nurse) by Neli Florez (offgoing nurse). Report included the following information SBAR, Kardex, Intake/Output and MAR.

## 2020-08-21 LAB
ANION GAP SERPL CALC-SCNC: 5 MMOL/L (ref 5–15)
BUN SERPL-MCNC: 9 MG/DL (ref 6–20)
BUN/CREAT SERPL: 11 (ref 12–20)
CALCIUM SERPL-MCNC: 8.1 MG/DL (ref 8.5–10.1)
CHLORIDE SERPL-SCNC: 105 MMOL/L (ref 97–108)
CO2 SERPL-SCNC: 26 MMOL/L (ref 21–32)
CREAT SERPL-MCNC: 0.84 MG/DL (ref 0.55–1.02)
ERYTHROCYTE [DISTWIDTH] IN BLOOD BY AUTOMATED COUNT: 14.7 % (ref 11.5–14.5)
GLUCOSE SERPL-MCNC: 88 MG/DL (ref 65–100)
HCT VFR BLD AUTO: 29.4 % (ref 35–47)
HGB BLD-MCNC: 9.6 G/DL (ref 11.5–16)
MCH RBC QN AUTO: 27 PG (ref 26–34)
MCHC RBC AUTO-ENTMCNC: 32.7 G/DL (ref 30–36.5)
MCV RBC AUTO: 82.6 FL (ref 80–99)
NRBC # BLD: 0 K/UL (ref 0–0.01)
NRBC BLD-RTO: 0 PER 100 WBC
PLATELET # BLD AUTO: 305 K/UL (ref 150–400)
PMV BLD AUTO: 9.9 FL (ref 8.9–12.9)
POTASSIUM SERPL-SCNC: 3.5 MMOL/L (ref 3.5–5.1)
RBC # BLD AUTO: 3.56 M/UL (ref 3.8–5.2)
SODIUM SERPL-SCNC: 136 MMOL/L (ref 136–145)
WBC # BLD AUTO: 5.8 K/UL (ref 3.6–11)

## 2020-08-21 PROCEDURE — 74011250636 HC RX REV CODE- 250/636: Performed by: SURGERY

## 2020-08-21 PROCEDURE — 74011250637 HC RX REV CODE- 250/637: Performed by: SURGERY

## 2020-08-21 PROCEDURE — 36415 COLL VENOUS BLD VENIPUNCTURE: CPT

## 2020-08-21 PROCEDURE — 74011000250 HC RX REV CODE- 250: Performed by: SURGERY

## 2020-08-21 PROCEDURE — 85027 COMPLETE CBC AUTOMATED: CPT

## 2020-08-21 PROCEDURE — 65270000029 HC RM PRIVATE

## 2020-08-21 PROCEDURE — 80048 BASIC METABOLIC PNL TOTAL CA: CPT

## 2020-08-21 PROCEDURE — 74011000258 HC RX REV CODE- 258: Performed by: SURGERY

## 2020-08-21 PROCEDURE — 74011250637 HC RX REV CODE- 250/637: Performed by: NURSE PRACTITIONER

## 2020-08-21 RX ORDER — ONDANSETRON 4 MG/1
4 TABLET, ORALLY DISINTEGRATING ORAL
Qty: 30 TAB | Refills: 0 | Status: SHIPPED | OUTPATIENT
Start: 2020-08-21 | End: 2021-08-06 | Stop reason: ALTCHOICE

## 2020-08-21 RX ORDER — HYOSCYAMINE SULFATE 0.12 MG/1
0.12 TABLET SUBLINGUAL
Qty: 20 TAB | Refills: 0 | Status: SHIPPED | OUTPATIENT
Start: 2020-08-21 | End: 2020-09-03

## 2020-08-21 RX ORDER — HYOSCYAMINE SULFATE 0.12 MG/1
0.12 TABLET SUBLINGUAL
Status: DISCONTINUED | OUTPATIENT
Start: 2020-08-21 | End: 2020-08-22 | Stop reason: HOSPADM

## 2020-08-21 RX ORDER — OXYCODONE AND ACETAMINOPHEN 5; 325 MG/1; MG/1
1-2 TABLET ORAL
Qty: 30 TAB | Refills: 0 | Status: SHIPPED | OUTPATIENT
Start: 2020-08-21 | End: 2020-08-28

## 2020-08-21 RX ADMIN — Medication 10 ML: at 13:08

## 2020-08-21 RX ADMIN — BENZOCAINE AND MENTHOL 1 LOZENGE: 15; 3.6 LOZENGE ORAL at 11:21

## 2020-08-21 RX ADMIN — OXYCODONE HYDROCHLORIDE AND ACETAMINOPHEN 2 TABLET: 5; 325 TABLET ORAL at 20:40

## 2020-08-21 RX ADMIN — BUPROPION HYDROCHLORIDE 75 MG: 75 TABLET, FILM COATED ORAL at 08:56

## 2020-08-21 RX ADMIN — BUPROPION HYDROCHLORIDE 75 MG: 75 TABLET, FILM COATED ORAL at 18:13

## 2020-08-21 RX ADMIN — CEFOTETAN DISODIUM 2 G: 2 INJECTION, POWDER, FOR SOLUTION INTRAMUSCULAR; INTRAVENOUS at 21:05

## 2020-08-21 RX ADMIN — Medication 10 ML: at 22:00

## 2020-08-21 RX ADMIN — OXYCODONE HYDROCHLORIDE AND ACETAMINOPHEN 2 TABLET: 5; 325 TABLET ORAL at 16:08

## 2020-08-21 RX ADMIN — ENOXAPARIN SODIUM 40 MG: 40 INJECTION SUBCUTANEOUS at 08:56

## 2020-08-21 RX ADMIN — CEFOTETAN DISODIUM 2 G: 2 INJECTION, POWDER, FOR SOLUTION INTRAMUSCULAR; INTRAVENOUS at 09:24

## 2020-08-21 RX ADMIN — OXYCODONE HYDROCHLORIDE AND ACETAMINOPHEN 2 TABLET: 5; 325 TABLET ORAL at 00:25

## 2020-08-21 RX ADMIN — OXYCODONE HYDROCHLORIDE AND ACETAMINOPHEN 2 TABLET: 5; 325 TABLET ORAL at 05:16

## 2020-08-21 RX ADMIN — HYOSCYAMINE SULFATE 0.12 MG: 0.12 TABLET ORAL; SUBLINGUAL at 18:13

## 2020-08-21 RX ADMIN — OXYCODONE HYDROCHLORIDE AND ACETAMINOPHEN 2 TABLET: 5; 325 TABLET ORAL at 10:43

## 2020-08-21 NOTE — PROGRESS NOTES
Problem: Falls - Risk of  Goal: *Absence of Falls  Description: Document Rebbecca Persons Fall Risk and appropriate interventions in the flowsheet.   Outcome: Progressing Towards Goal  Note: Fall Risk Interventions:            Medication Interventions: Evaluate medications/consider consulting pharmacy, Patient to call before getting OOB, Teach patient to arise slowly    Elimination Interventions: Call light in reach, Patient to call for help with toileting needs              Problem: Patient Education: Go to Patient Education Activity  Goal: Patient/Family Education  Outcome: Progressing Towards Goal

## 2020-08-21 NOTE — DISCHARGE INSTRUCTIONS
[id number] [facility] at [facility]   Post Operative Instructions    Procedure: Procedure(s):  DIAGNOSTIC LAPAROSCOPY REPAIR OF INTERNAL HERNIA AND SMALL BOWEL RESECTION    Please call your surgeons  office for a 2 week follow-up appointment with Dr Yesi Obando . Office address is: [address]/ [place] [address]  [phone]      Discharge Instructions: You may resume your usual diet as well as your usual medications. You are not cleared to drive if you are taking narcotic pain medication. If you are only using tylenol for pain and are comfortable sitting in a car, you may drive. No heavy lifting. No strenuous exercise. You are cleared to go up and down stairs. You may shower but no baths or swimming. Your incisions dont need any special care. You can wash them gently with  warm soap and water daily and pat them dry. Your staples will be removed in the office in 2 weeks    Ask you doctor when you can return to work. Call our office at  [phone] to make a 2 week follow up appointment. Call our office with any problems, questions or concerns. Call our office if you have any     Fevers of 101 or higher   Worsening pain  Nausea/Vomiting  Difficulty eating or drinking  Incisions that are red, open, draining or tender.

## 2020-08-21 NOTE — PROGRESS NOTES
[employer] [facility]    POD #2    Subjective     Doing well, had a BM, no N/V, pain controlled, coughing a lot feels something in her throat    Objective     Patient Vitals for the past 24 hrs:   Temp Pulse Resp BP SpO2   08/21/20 0825 98 °F (36.7 °C) 63 16 105/68 99 %   08/21/20 0309 98.3 °F (36.8 °C) 62 16 116/76 97 %   08/20/20 2025 98.7 °F (37.1 °C) 67 16 132/81 99 %   08/20/20 1619 98 °F (36.7 °C) 78 16 109/60 97 %       Date 08/20/20 0700 - 08/21/20 0659 08/21/20 0700 - 08/22/20 0659   Shift 3333-5960 3642-8875 24 Hour Total 5894-8228 5036-0879 24 Hour Total   INTAKE   Shift Total(mL/kg)         OUTPUT   Urine(mL/kg/hr)           Urine Occurrence(s)  2 x 2 x 1 x  1 x   Stool           Stool Occurrence(s)    1 x  1 x   Shift Total(mL/kg)         NET         Weight (kg) 74.4 74.4 74.4 74.4 74.4 74.4       PE  Pulm - CTAB  CV - RRR  Abd - soft, ND, BS present, incisions c/d/i  Throat - normal appearance    Labs  Recent Results (from the past 12 hour(s))   METABOLIC PANEL, BASIC    Collection Time: 08/21/20  3:16 AM   Result Value Ref Range    Sodium 136 136 - 145 mmol/L    Potassium 3.5 3.5 - 5.1 mmol/L    Chloride 105 97 - 108 mmol/L    CO2 26 21 - 32 mmol/L    Anion gap 5 5 - 15 mmol/L    Glucose 88 65 - 100 mg/dL    BUN 9 6 - 20 MG/DL    Creatinine 0.84 0.55 - 1.02 MG/DL    BUN/Creatinine ratio 11 (L) 12 - 20      GFR est AA >60 >60 ml/min/1.73m2    GFR est non-AA >60 >60 ml/min/1.73m2    Calcium 8.1 (L) 8.5 - 10.1 MG/DL   CBC W/O DIFF    Collection Time: 08/21/20  3:16 AM   Result Value Ref Range    WBC 5.8 3.6 - 11.0 K/uL    RBC 3.56 (L) 3.80 - 5.20 M/uL    HGB 9.6 (L) 11.5 - 16.0 g/dL    HCT 29.4 (L) 35.0 - 47.0 %    MCV 82.6 80.0 - 99.0 FL    MCH 27.0 26.0 - 34.0 PG    MCHC 32.7 30.0 - 36.5 g/dL    RDW 14.7 (H) 11.5 - 14.5 %    PLATELET 472 587 - 379 K/uL    MPV 9.9 8.9 - 12.9 FL    NRBC 0.0 0  WBC    ABSOLUTE NRBC 0. 00 0.00 - 0.01 K/uL         Assessment     Valdez Maciel is a 38 y. o.yr old female s/p laparoscopic repair of internal hernia and small bowel resectino    Plan     Doing well  Increase diet to GI lite, bowel function returning  DC IVF  Continue IV abx for now  Throat looks normal, likley just some irritation from ET tube in surgery  OOB more today  possibel DC home later today vs tomorrow    Marcie Cintron MD

## 2020-08-21 NOTE — PROGRESS NOTES
Bedside and Verbal shift change report given to ROSALINO Silveira (oncoming nurse) by Sada Calvert RN and Hugh Betancourt RN (offgoing nurse). Report included the following information SBAR.

## 2020-08-22 VITALS
HEIGHT: 63 IN | BODY MASS INDEX: 29.06 KG/M2 | HEART RATE: 72 BPM | WEIGHT: 164.02 LBS | RESPIRATION RATE: 16 BRPM | TEMPERATURE: 98.5 F | DIASTOLIC BLOOD PRESSURE: 70 MMHG | OXYGEN SATURATION: 98 % | SYSTOLIC BLOOD PRESSURE: 118 MMHG

## 2020-08-22 PROCEDURE — 74011000258 HC RX REV CODE- 258: Performed by: SURGERY

## 2020-08-22 PROCEDURE — 74011250637 HC RX REV CODE- 250/637: Performed by: SURGERY

## 2020-08-22 PROCEDURE — 74011250636 HC RX REV CODE- 250/636: Performed by: SURGERY

## 2020-08-22 PROCEDURE — 74011000250 HC RX REV CODE- 250: Performed by: SURGERY

## 2020-08-22 RX ORDER — BUPROPION HYDROCHLORIDE 75 MG/1
75 TABLET ORAL DAILY
Status: DISCONTINUED | OUTPATIENT
Start: 2020-08-23 | End: 2020-08-22 | Stop reason: HOSPADM

## 2020-08-22 RX ADMIN — Medication 10 ML: at 06:00

## 2020-08-22 RX ADMIN — BUPROPION HYDROCHLORIDE 75 MG: 75 TABLET, FILM COATED ORAL at 09:47

## 2020-08-22 RX ADMIN — ENOXAPARIN SODIUM 40 MG: 40 INJECTION SUBCUTANEOUS at 09:47

## 2020-08-22 RX ADMIN — OXYCODONE HYDROCHLORIDE AND ACETAMINOPHEN 2 TABLET: 5; 325 TABLET ORAL at 11:58

## 2020-08-22 RX ADMIN — CEFOTETAN DISODIUM 2 G: 2 INJECTION, POWDER, FOR SOLUTION INTRAMUSCULAR; INTRAVENOUS at 09:55

## 2020-08-22 RX ADMIN — OXYCODONE HYDROCHLORIDE AND ACETAMINOPHEN 2 TABLET: 5; 325 TABLET ORAL at 03:21

## 2020-08-22 NOTE — PROGRESS NOTES
Patient said she has a prescription for wellbutrin but did not take it. She now has decided to take it. Per chart review, 1/2020-Dr. Maza prescribed Wellbutrin for short attention span and for smoking cessation -75 mg tablet; Take one tab daily for the first week and then BID afterwards. Discussed with patient to call her PCP on Monday to discuss further if she wants to continue on the same regimen. Patient now motivated to stop smoking.

## 2020-08-22 NOTE — PROGRESS NOTES
Problem: Falls - Risk of  Goal: *Absence of Falls  Description: Document Aubree Sawant Fall Risk and appropriate interventions in the flowsheet.   Outcome: Progressing Towards Goal  Note: Fall Risk Interventions:            Medication Interventions: Evaluate medications/consider consulting pharmacy    Elimination Interventions: Call light in reach, Patient to call for help with toileting needs

## 2020-08-22 NOTE — PROGRESS NOTES
Problem: Pain  Goal: *Control of Pain  Description: Assess patient's pain level using numeric pain scale. Administer pain medication as needed/ordered. Reassess pain level within one hour after medication administration. Outcome: Progressing Towards Goal  Note: Assess patient's pain level using numeric pain scale. Administer pain medication as needed/ordered. Reassess pain level within one hour after pain medication administration.

## 2020-08-22 NOTE — PROGRESS NOTES
Bedside shift change report given to [facility] (oncoming nurse) by Collette Watt (offgoing nurse). Report included the following information SBAR, Kardex, Intake/Output and MAR.

## 2020-08-22 NOTE — PROGRESS NOTES
I have reviewed discharge instructions with the patient. The patient verbalized understanding. Patient discharging home with family.

## 2020-08-22 NOTE — ROUTINE PROCESS
Nurse Jessy Yan gave bedside report to oncoming nurse Kenytata Saginaw RN by [employer] and Kardex

## 2020-08-22 NOTE — PROGRESS NOTES
Progress Note    Patient: Susan Rodriguez MRN: [id number]  SSN: [id number]    YOB: 1985  Age: 38 y.o. Sex: female      Admit Date: 2020    3 Days Post-Op    Procedure:  Procedure(s):  DIAGNOSTIC LAPAROSCOPY/ REPAIR OF INTERNAL HERNIA/ SMALL BOWEL RESECTION    Subjective:     No acute surgical issues. Patient is doing well. Tolerating diet without nausea or vomiting. Pain is under control. Pt reported some loose stool    Objective:     Visit Vitals  /70 (BP 1 Location: Right arm, BP Patient Position: At rest)   Pulse 72   Temp 98.5 °F (36.9 °C)   Resp 16   Ht 5' 3\" (1.6 m)   Wt 164 lb 0.4 oz (74.4 kg)   SpO2 98%   BMI 29.06 kg/m²       Temp (24hrs), Av.7 °F (37.1 °C), Min:98.2 °F (36.8 °C), Max:99.3 °F (37.4 °C)        Physical Exam:    Gen:  NAD  Pulm:  Unlabored  Abd:  S/ND/appropriate TTP  Wound:  C/D/I    No results found for this or any previous visit (from the past 24 hour(s)).       Assessment:     Hospital Problems  Date Reviewed: 2020          Codes Class Noted POA    Abdominal pain ICD-10-CM: R10.9  ICD-9-CM: 789.00  2020 Unknown        N&V (nausea and vomiting) ICD-10-CM: R11.2  ICD-9-CM: 787.01  2020 Unknown        Internal hernia ICD-10-CM: K45.8  ICD-9-CM: 553.8  2020 Unknown              Plan/Recommendations/Medical Decision Making:     - Diet as tolerated  - Pain control  - Plan DC home this am  - Follow-up with Dr. Susanne Mcduffie in 2 weeks

## 2020-08-22 NOTE — PROGRESS NOTES
Problem: Falls - Risk of  Goal: *Absence of Falls  Description: Document Tito Sharad Fall Risk and appropriate interventions in the flowsheet.   Outcome: Progressing Towards Goal  Note: Fall Risk Interventions:            Medication Interventions: Evaluate medications/consider consulting pharmacy    Elimination Interventions: Call light in reach, Patient to call for help with toileting needs

## 2020-09-03 ENCOUNTER — TELEPHONE (OUTPATIENT)
Dept: SURGERY | Age: 35
End: 2020-09-03

## 2020-09-03 RX ORDER — HYOSCYAMINE SULFATE 0.12 MG/1
TABLET SUBLINGUAL
Qty: 20 TAB | Refills: 0 | Status: SHIPPED | OUTPATIENT
Start: 2020-09-03 | End: 2020-09-04 | Stop reason: SDUPTHER

## 2020-09-03 NOTE — TELEPHONE ENCOUNTER
Please call pt back. Pt stated that she has some concerns and questions following up after her surgery. Pt would like to also get a refill for the percocet.

## 2020-09-04 ENCOUNTER — TELEPHONE (OUTPATIENT)
Dept: SURGERY | Age: 35
End: 2020-09-04

## 2020-09-04 RX ORDER — CYCLOBENZAPRINE HCL 10 MG
10 TABLET ORAL
Qty: 30 TAB | Refills: 0 | Status: SHIPPED | OUTPATIENT
Start: 2020-09-04 | End: 2020-09-22 | Stop reason: SDUPTHER

## 2020-09-04 RX ORDER — HYOSCYAMINE SULFATE 0.12 MG/1
TABLET SUBLINGUAL
Qty: 20 TAB | Refills: 0 | Status: SHIPPED | OUTPATIENT
Start: 2020-09-04 | End: 2021-08-06

## 2020-09-04 NOTE — TELEPHONE ENCOUNTER
Spoke with patient she stated she is having what feels like muscle spasms in her stomach and she still has pain from the surgery. She would more pain medication called in and something for the spasms. Hycosomine is not helping with cramping.  Will forward to provider for review

## 2020-09-04 NOTE — TELEPHONE ENCOUNTER
Has been off percocet since Monday   Took Aleve \"I know I'm not supposed to, but Tylenol does nothing\"  Loose stools after eating 3-4xday   No black or bloody stool   No n/v   No fever or chills, chest pain or shortness of breath.   Losing weight and is 160 lbs   Using Herbal life to supplement diet   Cramping / spasm pain   Will try Flexeril and can take with 2 extra strength tylenol every 8 hours as needed  Abdominal support and heating pad  Stressed NO NSAIDS   Has appt next week

## 2020-09-09 ENCOUNTER — VIRTUAL VISIT (OUTPATIENT)
Dept: SURGERY | Age: 35
End: 2020-09-09
Payer: COMMERCIAL

## 2020-09-09 DIAGNOSIS — Z09 POSTOPERATIVE EXAMINATION: Primary | ICD-10-CM

## 2020-09-09 PROCEDURE — 99024 POSTOP FOLLOW-UP VISIT: CPT | Performed by: NURSE PRACTITIONER

## 2020-09-09 NOTE — PROGRESS NOTES
Subjective:      Billee Holter is a 38 y.o. female presents for postop care following  Diagnostic lap, repair of internal hernia and small bowel resection on 8/19/2020. Appetite is good. Eating a regular gastric bypass diet without difficulty. Bowel movements are regular. The patient is voiding without difficulty. She still complains of spasms. Flexeril helping. Taking Tylenol as needed. .      Ms. Rafael King has a reminder for a \"due or due soon\" health maintenance. I have asked that she contact her primary care provider for follow-up on this health maintenance. Objective: There were no vitals taken for this visit. General:  alert, cooperative, no distress   Abdomen: soft, slight tenderness at incision sites. Incision:   healing well, no drainage, no erythema, staples removed, no dehiscence, incision well approximated. Staples removed. Steri-strips placed. Assessment:     Doing well postoperatively. Plan:     1. May increase activity as tolerated. 2. May return to crossfit at 6 weeks post-op but may need to modify exercises based on comfort. Limit lifting 20 lbs x 2 weeks, then 40 lbs for 2 weeks. 3. Routine bariatric follow up. 4. May get incision wet. Pt  verbalized understanding and questions were answered to the best of my knowledge and ability.

## 2020-09-09 NOTE — PROGRESS NOTES
1. Have you been to the ER, urgent care clinic since your last visit? Hospitalized since your last visit? No    2. Have you seen or consulted any other health care providers outside of the [address] [place] [address] since your last visit? Include any pap smears or colon screening. No    This visit is not a virtual.  The patient was seen in the office. 2/10 pain.    complains of spasms

## 2020-09-11 NOTE — DISCHARGE SUMMARY
Physician Discharge Summary     Patient ID:  Marsha Craig  [id number]  1 y.o.  1985    Admit Date: 8/19/2020    Discharge Date: 8/22/2020    Admission Diagnoses: Abdominal pain [R10.9];N&V (nausea and vomiting) [R11.2]; Internal hernia [K45.8]    Discharge Diagnoses: Active Problems:    Abdominal pain (8/19/2020)      N&V (nausea and vomiting) (8/19/2020)      Internal hernia (8/19/2020)         Admission Condition: fair    Discharge Condition: Good    Procedure(s):    8/19/2020 - Procedure(s):  DIAGNOSTIC LAPAROSCOPY/ REPAIR OF INTERNAL HERNIA/ SMALL BOWEL RESECTION      Hospital Course:   She was admitted after repair of the internal hernia. She had a slow advancement of diet and was DC home on POD 3    Consults: None    Significant Diagnostic Studies: none    Disposition: home    Patient Instructions:   Cannot display discharge medications since this patient is not currently admitted.       Activity: No heavy lifting for 2 weeks  Diet: Regular Diet  Wound Care: Keep wound clean and dry    Follow-up with Randal Deluca MD in 2 week(s)  Follow-up tests/labs none    Signed:  Randal Deluca MD  9/11/2020  12:43 PM

## 2020-09-22 DIAGNOSIS — Z98.84 S/P GASTRIC BYPASS: Primary | ICD-10-CM

## 2020-09-22 RX ORDER — CYCLOBENZAPRINE HCL 10 MG
10 TABLET ORAL
Qty: 15 TAB | Refills: 0 | Status: SHIPPED | OUTPATIENT
Start: 2020-09-22 | End: 2021-08-06

## 2020-09-22 NOTE — TELEPHONE ENCOUNTER
Sent refill in for flexeril patient needs to follow up in 2 weeks if  She is still having pain and muscle spasms

## 2020-09-22 NOTE — TELEPHONE ENCOUNTER
Patient called and would like a refill on muscle spasm medication. Patient uses [facility] on [address]

## 2021-08-06 ENCOUNTER — OFFICE VISIT (OUTPATIENT)
Dept: PRIMARY CARE CLINIC | Age: 36
End: 2021-08-06
Payer: COMMERCIAL

## 2021-08-06 VITALS
WEIGHT: 179 LBS | OXYGEN SATURATION: 100 % | HEIGHT: 63 IN | HEART RATE: 62 BPM | TEMPERATURE: 97.6 F | DIASTOLIC BLOOD PRESSURE: 65 MMHG | SYSTOLIC BLOOD PRESSURE: 104 MMHG | BODY MASS INDEX: 31.71 KG/M2 | RESPIRATION RATE: 16 BRPM

## 2021-08-06 DIAGNOSIS — Z11.59 ENCOUNTER FOR HEPATITIS C SCREENING TEST FOR LOW RISK PATIENT: ICD-10-CM

## 2021-08-06 DIAGNOSIS — R53.83 FATIGUE, UNSPECIFIED TYPE: ICD-10-CM

## 2021-08-06 DIAGNOSIS — Z00.00 WELL ADULT ON ROUTINE HEALTH CHECK: Primary | ICD-10-CM

## 2021-08-06 DIAGNOSIS — Z98.84 PERSONAL HX OF GASTRIC BYPASS: ICD-10-CM

## 2021-08-06 PROCEDURE — 99395 PREV VISIT EST AGE 18-39: CPT | Performed by: FAMILY MEDICINE

## 2021-08-06 NOTE — PROGRESS NOTES
Identified pt with two pt identifiers(name and ). No chief complaint on file. 3 most recent PHQ Screens 3/5/2020   Little interest or pleasure in doing things Not at all   Feeling down, depressed, irritable, or hopeless Not at all   Total Score PHQ 2 0   Trouble falling or staying asleep, or sleeping too much -   Feeling tired or having little energy -   Poor appetite, weight loss, or overeating -   Feeling bad about yourself - or that you are a failure or have let yourself or your family down -   Trouble concentrating on things such as school, work, reading, or watching TV -   Moving or speaking so slowly that other people could have noticed; or the opposite being so fidgety that others notice -   Thoughts of being better off dead, or hurting yourself in some way -   PHQ 9 Score -   How difficult have these problems made it for you to do your work, take care of your home and get along with others -        There were no vitals filed for this visit. Health Maintenance Due   Topic    Hepatitis C Screening     Pneumococcal 0-64 years (1 of 2 - PPSV23)    COVID-19 Vaccine (1)       1. Have you been to the ER, urgent care clinic since your last visit? Hospitalized since your last visit? No    2. Have you seen or consulted any other health care providers outside of the [address] [place] [address] since your last visit? Include any pap smears or colon screening.  No

## 2021-08-06 NOTE — PROGRESS NOTES
Subjective:   43 y.o. female for Well Woman Check. Her gyne and breast care is done elsewhere by her Ob-Gyne physician. This is a 42 y/o F with hx of gastric by pass surgery  is here with a complain of  feeling excessively tired lately. Lack of energy. Has been taking OTC iron supplement regularly but she does not take any multivitamin. Not sleeping well. Takes OTC benadryl, tylenol PM.  Denies any snoring, anxiety, depression, abd pain, constipation, diarrhea, HA. She some times notice her vision gets blurry. No pain. Strongly advised to follow up with eye doctor. Patient Active Problem List   Diagnosis Code    S/P gastric bypass Z98.84    Adjustment insomnia F51.02    Anemia D64.9    Masses of both breasts N63.10, N63.20    Class 1 obesity due to excess calories without serious comorbidity with body mass index (BMI) of 32.0 to 32.9 in adult E66.09, Z68.32    Short attention span R41.840    Abdominal pain R10.9    N&V (nausea and vomiting) R11.2    Internal hernia K45.8     Current Outpatient Medications   Medication Sig Dispense Refill    albuterol (PROVENTIL HFA, VENTOLIN HFA, PROAIR HFA) 90 mcg/actuation inhaler Take 1 Puff by inhalation every four (4) hours as needed for Wheezing. (Patient not taking: Reported on 2021) 1 Inhaler 1     Allergies   Allergen Reactions    Seafood [Iodine And Iodide Containing Products] Hives    Seafood [Shrimp] Hives     Past Medical History:   Diagnosis Date    Anemia     Nausea & vomiting     Transient elevated blood pressure      Past Surgical History:   Procedure Laterality Date    HX BREAST BIOPSY Bilateral     not sure of dates one 2009 not sure of other date    HX  SECTION  9/10    HX DILATION AND CURETTAGE      HX HERNIA REPAIR  2020    internal hernia repair Dr. Trang Garvey      gastric banding.      HX SMALL BOWEL RESECTION  2020    OR ABDOMEN SURGERY PROC UNLISTED      gastric bypass    OR LAP,DIAGNOSTIC ABDOMEN  08/19/2020     Family History   Problem Relation Age of Onset    Hypertension Mother     Diabetes Mother     Hypertension Father     Diabetes Father     Cancer Father         lung    Hypertension Brother     Other Brother     Hypertension Sister     Breast Cancer Paternal Grandmother         age unknown     Social History     Tobacco Use    Smoking status: Current Every Day Smoker     Packs/day: 0.25     Years: 6.00     Pack years: 1.50    Smokeless tobacco: Never Used   Substance Use Topics    Alcohol use: Yes     Alcohol/week: 0.0 standard drinks     Comment: Daily        Lab Results   Component Value Date/Time    WBC 5.8 08/21/2020 03:16 AM    HGB 9.6 (L) 08/21/2020 03:16 AM    HCT 29.4 (L) 08/21/2020 03:16 AM    PLATELET 808 44/83/9229 03:16 AM    MCV 82.6 08/21/2020 03:16 AM     Lab Results   Component Value Date/Time    Cholesterol, total 172 01/29/2020 09:55 AM    HDL Cholesterol 77 01/29/2020 09:55 AM    LDL, calculated 85 01/29/2020 09:55 AM    Triglyceride 48 01/29/2020 09:55 AM    CHOL/HDL Ratio 3.0 10/28/2009 04:12 PM     Lab Results   Component Value Date/Time    ALT (SGPT) 33 08/19/2020 07:13 AM    Alk.  phosphatase 70 08/19/2020 07:13 AM    Bilirubin, total 0.4 08/19/2020 07:13 AM    Albumin 4.2 08/19/2020 07:13 AM    Protein, total 8.5 (H) 08/19/2020 07:13 AM    PLATELET 380 57/06/6896 03:16 AM     Lab Results   Component Value Date/Time    GFR est non-AA >60 08/21/2020 03:16 AM    GFR est AA >60 08/21/2020 03:16 AM    Creatinine 0.84 08/21/2020 03:16 AM    BUN 9 08/21/2020 03:16 AM    Sodium 136 08/21/2020 03:16 AM    Potassium 3.5 08/21/2020 03:16 AM    Chloride 105 08/21/2020 03:16 AM    CO2 26 08/21/2020 03:16 AM    Magnesium 2.0 08/20/2020 02:07 AM    Phosphorus 3.7 08/20/2020 02:07 AM     Lab Results   Component Value Date/Time    TSH 0.813 01/29/2020 09:55 AM    TSH 1.390 10/31/2018 03:46 PM    T3 Uptake 24 08/14/2017 12:00 AM    T4, Free 1.05 10/31/2018 03:46 PM    T4, Total 6.2 08/14/2017 12:00 AM      Lab Results   Component Value Date/Time    Hemoglobin A1c 5.3 01/29/2020 09:55 AM    Hemoglobin A1c, External 6.0 08/18/2016 12:00 AM         ROS: refer to HPI. No TIA's or unusual headaches, no dysphagia. No prolonged cough. No dyspnea or chest pain on exertion. No abdominal pain, change in bowel habits, black or bloody stools. No urinary tract symptoms. No new or unusual musculoskeletal symptoms. Specific concerns today: refer to HPI. Objective: The patient appears well, alert, oriented x 3, in no distress. Visit Vitals  /65 (BP 1 Location: Left upper arm, BP Patient Position: Sitting, BP Cuff Size: Adult)   Pulse 62   Temp 97.6 °F (36.4 °C) (Temporal)   Resp 16   Ht 5' 3\" (1.6 m)   Wt 179 lb (81.2 kg)   LMP 07/24/2021 (Exact Date)   SpO2 100%   BMI 31.71 kg/m²     ENT normal.  Neck supple. No adenopathy or thyromegaly. MICHAEL. Lungs are clear, good air entry, no wheezes, rhonchi or rales. S1 and S2 normal, no murmurs, regular rate and rhythm. Abdomen soft without tenderness, guarding, mass or organomegaly. Extremities show no edema, normal peripheral pulses. Neurological is normal, no focal findings. Breast and Pelvic exams are deferred. Assessment/Plan:   Well Woman  lose weight, increase physical activity, bring BP log to office visit, follow low fat diet, follow low salt diet, continue present plan, routine labs ordered, call if any problems    ICD-10-CM ICD-9-CM    1. Well adult on routine health check  F16.37 O37.1 METABOLIC PANEL, COMPREHENSIVE      HEMOGLOBIN A1C WITH EAG      CBC WITH AUTOMATED DIFF      THYROID CASCADE PROFILE      LIPID PANEL      METABOLIC PANEL, COMPREHENSIVE      HEMOGLOBIN A1C WITH EAG      CBC WITH AUTOMATED DIFF      THYROID CASCADE PROFILE      LIPID PANEL   2. Personal hx of gastric bypass  Z98.84 V45.86 VITAMIN D, 25 HYDROXY      VITAMIN B12      VITAMIN D, 25 HYDROXY      VITAMIN B12   3.  Encounter for hepatitis C screening test for low risk patient  Z11.59 V73.89 HEPATITIS C AB      HEPATITIS C AB     Diagnoses and all orders for this visit:    1. Well adult on routine health check  -     METABOLIC PANEL, COMPREHENSIVE; Future  -     HEMOGLOBIN A1C WITH EAG; Future  -     CBC WITH AUTOMATED DIFF; Future  -     THYROID CASCADE PROFILE; Future  -     LIPID PANEL; Future    2. Personal hx of gastric bypass  -     VITAMIN D, 25 HYDROXY; Future  -     VITAMIN B12; Future    3. Encounter for hepatitis C screening test for low risk patient  -     HEPATITIS C AB; Future    4. Fatigue, unspecified type  -     THYROID CASCADE PROFILE; Future  -     VITAMIN D, 25 HYDROXY; Future  -     VITAMIN B12; Future      Follow-up and Dispositions    · Return if symptoms worsen or fail to improve.        current treatment plan is effective, no change in therapy  reviewed diet, exercise and weight control

## 2021-08-07 LAB
25(OH)D3+25(OH)D2 SERPL-MCNC: 9.7 NG/ML (ref 30–100)
ALBUMIN SERPL-MCNC: 4.2 G/DL (ref 3.8–4.8)
ALBUMIN/GLOB SERPL: 1.4 {RATIO} (ref 1.2–2.2)
ALP SERPL-CCNC: 55 IU/L (ref 48–121)
ALT SERPL-CCNC: 16 IU/L (ref 0–32)
AST SERPL-CCNC: 18 IU/L (ref 0–40)
BASOPHILS # BLD AUTO: 0.1 X10E3/UL (ref 0–0.2)
BASOPHILS NFR BLD AUTO: 1 %
BILIRUB SERPL-MCNC: 0.3 MG/DL (ref 0–1.2)
BUN SERPL-MCNC: 12 MG/DL (ref 6–20)
BUN/CREAT SERPL: 17 (ref 9–23)
CALCIUM SERPL-MCNC: 9.3 MG/DL (ref 8.7–10.2)
CHLORIDE SERPL-SCNC: 104 MMOL/L (ref 96–106)
CHOLEST SERPL-MCNC: 180 MG/DL (ref 100–199)
CO2 SERPL-SCNC: 24 MMOL/L (ref 20–29)
CREAT SERPL-MCNC: 0.71 MG/DL (ref 0.57–1)
EOSINOPHIL # BLD AUTO: 0.1 X10E3/UL (ref 0–0.4)
EOSINOPHIL NFR BLD AUTO: 1 %
ERYTHROCYTE [DISTWIDTH] IN BLOOD BY AUTOMATED COUNT: 15.7 % (ref 11.7–15.4)
EST. AVERAGE GLUCOSE BLD GHB EST-MCNC: 108 MG/DL
GLOBULIN SER CALC-MCNC: 3.1 G/DL (ref 1.5–4.5)
GLUCOSE SERPL-MCNC: 76 MG/DL (ref 65–99)
HBA1C MFR BLD: 5.4 % (ref 4.8–5.6)
HCT VFR BLD AUTO: 34.8 % (ref 34–46.6)
HCV AB S/CO SERPL IA: <0.1 S/CO RATIO (ref 0–0.9)
HDLC SERPL-MCNC: 88 MG/DL
HGB BLD-MCNC: 10.5 G/DL (ref 11.1–15.9)
IMM GRANULOCYTES # BLD AUTO: 0 X10E3/UL (ref 0–0.1)
IMM GRANULOCYTES NFR BLD AUTO: 0 %
LDLC SERPL CALC-MCNC: 83 MG/DL (ref 0–99)
LYMPHOCYTES # BLD AUTO: 1.2 X10E3/UL (ref 0.7–3.1)
LYMPHOCYTES NFR BLD AUTO: 27 %
MCH RBC QN AUTO: 23.7 PG (ref 26.6–33)
MCHC RBC AUTO-ENTMCNC: 30.2 G/DL (ref 31.5–35.7)
MCV RBC AUTO: 79 FL (ref 79–97)
MONOCYTES # BLD AUTO: 0.6 X10E3/UL (ref 0.1–0.9)
MONOCYTES NFR BLD AUTO: 13 %
NEUTROPHILS # BLD AUTO: 2.5 X10E3/UL (ref 1.4–7)
NEUTROPHILS NFR BLD AUTO: 58 %
PLATELET # BLD AUTO: 447 X10E3/UL (ref 150–450)
POTASSIUM SERPL-SCNC: 4.7 MMOL/L (ref 3.5–5.2)
PROT SERPL-MCNC: 7.3 G/DL (ref 6–8.5)
RBC # BLD AUTO: 4.43 X10E6/UL (ref 3.77–5.28)
SODIUM SERPL-SCNC: 139 MMOL/L (ref 134–144)
TRIGL SERPL-MCNC: 45 MG/DL (ref 0–149)
TSH SERPL DL<=0.005 MIU/L-ACNC: 0.98 UIU/ML (ref 0.45–4.5)
VIT B12 SERPL-MCNC: 943 PG/ML (ref 232–1245)
VLDLC SERPL CALC-MCNC: 9 MG/DL (ref 5–40)
WBC # BLD AUTO: 4.4 X10E3/UL (ref 3.4–10.8)

## 2021-08-10 ENCOUNTER — TELEPHONE (OUTPATIENT)
Dept: PRIMARY CARE CLINIC | Age: 36
End: 2021-08-10

## 2021-08-10 DIAGNOSIS — E55.9 VITAMIN D DEFICIENCY: Primary | ICD-10-CM

## 2021-08-10 RX ORDER — ASPIRIN 325 MG
50000 TABLET, DELAYED RELEASE (ENTERIC COATED) ORAL
Qty: 4 CAPSULE | Refills: 3 | Status: SHIPPED | OUTPATIENT
Start: 2021-08-10 | End: 2022-07-22

## 2021-08-10 NOTE — TELEPHONE ENCOUNTER
----- Message from Lord Saunders sent at 8/10/2021 12:48 PM EDT -----  Regarding: MD Link/Telephone  General Message/Vendor Calls    Caller's first and last name: Self      Reason for call: Lab results. Callback required yes/no and why: Yes      Best contact number(s): [phone]      Details to clarify the request: Pt has a few questions regarding her most recent lab results.       Lord Saunders

## 2021-08-10 NOTE — TELEPHONE ENCOUNTER
----- Message from [employer] sent at 8/10/2021 12:48 PM EDT -----  Regarding: MD Link/Telephone  General Message/Vendor Calls    Caller's first and last name: Self      Reason for call: Lab results. Callback required yes/no and why: Yes      Best contact number(s): [phone]      Details to clarify the request: Pt has a few questions regarding her most recent lab results.       [employer]

## 2021-08-10 NOTE — PROGRESS NOTES
Sent via my chart. Roberto Kellogg,    1. Kidney, liver, thyroid level is within normal range. 2. Cholesterol level is normal.    3. Vit d level is low. Start taking Vit D 50,000 IU one tab per week for 12 weeks followed by OTC 2,000 IU vit D daily. Increase vit D rich food. Exposed to sun total of 60 minutes /week will help. Will recheck level in 6 months. Prescription sent to his pharmacy. 4. Average blood sugar( Hg A1c ) is in normal range. 5.  Hemoglobin is slightly on the lower side. Please continue iron supplement and  increase iron rich diet such as green leafy vegetables, whole or enriched breads or grains, iron-fortified cereals, legumes,  dried fruits, soy products. 6. Vit B 12 level is normal.    7. Hep C is negative. Please let me know if there is any questions. Thanks.   Dr. Abilio Rueda

## 2022-02-09 ENCOUNTER — APPOINTMENT (OUTPATIENT)
Dept: GENERAL RADIOLOGY | Age: 37
End: 2022-02-09
Attending: NURSE PRACTITIONER
Payer: COMMERCIAL

## 2022-02-09 ENCOUNTER — HOSPITAL ENCOUNTER (EMERGENCY)
Age: 37
Discharge: HOME | End: 2022-02-09
Attending: STUDENT IN AN ORGANIZED HEALTH CARE EDUCATION/TRAINING PROGRAM
Payer: COMMERCIAL

## 2022-02-09 VITALS
RESPIRATION RATE: 16 BRPM | TEMPERATURE: 97 F | SYSTOLIC BLOOD PRESSURE: 147 MMHG | DIASTOLIC BLOOD PRESSURE: 109 MMHG | HEART RATE: 70 BPM | OXYGEN SATURATION: 98 %

## 2022-02-09 DIAGNOSIS — M25.562 ACUTE PAIN OF LEFT KNEE: Primary | ICD-10-CM

## 2022-02-09 DIAGNOSIS — M76.52 PATELLAR TENDINITIS OF LEFT KNEE: ICD-10-CM

## 2022-02-09 PROCEDURE — 99282 EMERGENCY DEPT VISIT SF MDM: CPT

## 2022-02-09 PROCEDURE — 73562 X-RAY EXAM OF KNEE 3: CPT

## 2022-02-09 RX ORDER — DICLOFENAC SODIUM 75 MG/1
75 TABLET, DELAYED RELEASE ORAL
Qty: 30 TABLET | Refills: 0 | Status: SHIPPED | OUTPATIENT
Start: 2022-02-09 | End: 2022-02-24

## 2022-02-09 RX ORDER — FAMOTIDINE 20 MG/1
20 TABLET, FILM COATED ORAL 2 TIMES DAILY
Qty: 30 TABLET | Refills: 0 | Status: SHIPPED | OUTPATIENT
Start: 2022-02-09 | End: 2022-02-24

## 2022-02-09 NOTE — ED NOTES
I have reviewed discharge instructions with the patient. The patient verbalized understanding.  Given ace wrap and crutches

## 2022-02-09 NOTE — ED TRIAGE NOTES
Pt c/o left knee pain since Sunday, pt stated it is a little swollen, denies injury, denies fever , pt ambulatory to triage with slight limp

## 2022-02-09 NOTE — Clinical Note
[address]  [address] [facility] [address]  [phone]    Work/School Note    Date: 2/9/2022    To Whom It May concern:      Yasmany Silverman was seen and treated today in the emergency room by the following provider(s):  Attending Provider: Nery Beatty MD  Nurse Practitioner: Rozelle Favre, NP. Yasmany Silverman is excused from work/school on 02/09/22. She is clear to return to work/school on 02/10/22.         Sincerely,          Fina Luque NP

## 2022-02-09 NOTE — ED PROVIDER NOTES
76-year-old female with no pertinent medical history presents to the ER today for evaluation of atraumatic left knee pain. Patient states that she started to develop a sensation of \"sharp\" relatively pinpoint anterior left knee pain (as she gestures around distal patella and patella tendon) on  that has been minimally relieved by OTC Tylenol. The pain is nonradiating in nature and seems to be worse when she flexes her knee and when she walks on it at certain angles. She denies any history of osteoarthritis, gouty arthritis, or inflammatory arthritis. She denies any leg swelling, redness, tingling. Past Medical History:   Diagnosis Date    Anemia     Nausea & vomiting     Transient elevated blood pressure        Past Surgical History:   Procedure Laterality Date    HX BREAST BIOPSY Bilateral     not sure of dates one 2009 not sure of other date    HX  SECTION  9/10    HX DILATION AND CURETTAGE      HX HERNIA REPAIR  2020    internal hernia repair Dr. Lilly Parks      gastric banding.      HX SMALL BOWEL RESECTION  2020    NC ABDOMEN SURGERY PROC UNLISTED      gastric bypass    NC LAP,DIAGNOSTIC ABDOMEN  2020         Family History:   Problem Relation Age of Onset    Hypertension Mother     Diabetes Mother     Hypertension Father     Diabetes Father     Cancer Father         lung    Hypertension Brother     Other Brother     Hypertension Sister     Breast Cancer Paternal Grandmother         age unknown       Social History     Socioeconomic History    Marital status:      Spouse name: Not on file    Number of children: Not on file    Years of education: Not on file    Highest education level: Not on file   Occupational History    Not on file   Tobacco Use    Smoking status: Current Every Day Smoker     Packs/day: 0.25     Years: 6.00     Pack years: 1.50    Smokeless tobacco: Never Used   Vaping Use    Vaping Use: Never used   Substance and Sexual Activity    Alcohol use: Yes     Alcohol/week: 0.0 standard drinks     Comment: Daily    Drug use: Never    Sexual activity: Yes     Partners: Male   Other Topics Concern    Not on file   Social History Narrative    Not on file     Social Determinants of Health     Financial Resource Strain:     Difficulty of Paying Living Expenses: Not on file   Food Insecurity:     Worried About Running Out of Food in the Last Year: Not on file    Anuel of Food in the Last Year: Not on file   Transportation Needs:     Lack of Transportation (Medical): Not on file    Lack of Transportation (Non-Medical): Not on file   Physical Activity:     Days of Exercise per Week: Not on file    Minutes of Exercise per Session: Not on file   Stress:     Feeling of Stress : Not on file   Social Connections:     Frequency of Communication with Friends and Family: Not on file    Frequency of Social Gatherings with Friends and Family: Not on file    Attends Nondenominational Services: Not on file    Active Member of [address] [place] [address] or Organizations: Not on file    Attends Club or Organization Meetings: Not on file    Marital Status: Not on file   Intimate Partner Violence:     Fear of Current or Ex-Partner: Not on file    Emotionally Abused: Not on file    Physically Abused: Not on file    Sexually Abused: Not on file   Housing Stability:     Unable to Pay for Housing in the Last Year: Not on file    Number of [place] in the Last Year: Not on file    Unstable Housing in the Last Year: Not on file         ALLERGIES: Seafood [iodine and iodide containing products] and Seafood [shrimp]    Review of Systems   Constitutional: Negative for fever. HENT: Negative for sore throat. Eyes: Negative for visual disturbance. Respiratory: Negative for shortness of breath. Cardiovascular: Negative for palpitations. Gastrointestinal: Negative for vomiting. Genitourinary: Negative for dysuria.    Musculoskeletal: Positive for arthralgias and gait problem. Skin: Negative for rash. Neurological: Negative for dizziness. Psychiatric/Behavioral: Negative for dysphoric mood. Vitals:    02/09/22 1124   BP: (!) 147/109   Pulse: 70   Resp: 16   Temp: 97 °F (36.1 °C)   SpO2: 98%            Physical Exam  Vitals and nursing note reviewed. Constitutional:       General: She is not in acute distress. Appearance: Normal appearance. She is not ill-appearing. HENT:      Head: Normocephalic. Nose: Nose normal.   Eyes:      General: No scleral icterus. Extraocular Movements: Extraocular movements intact. Cardiovascular:      Rate and Rhythm: Normal rate and regular rhythm. Pulmonary:      Effort: Pulmonary effort is normal.   Abdominal:      General: There is no distension. Tenderness: There is no guarding. Musculoskeletal:         General: Normal range of motion. Cervical back: Normal range of motion and neck supple. Left knee: Normal range of motion. Tenderness present over the patellar tendon. No LCL laxity or MCL laxity. Comments: There is relatively small area of reproducible pain along distal patella and patella tendon. There is no obvious deformity or palpable joint effusion. There is no edema or increased circumference of the left lower extremity. Skin:     General: Skin is warm and dry. Neurological:      Mental Status: She is alert and oriented to person, place, and time. Mental status is at baseline. Psychiatric:         Mood and Affect: Mood normal.         Behavior: Behavior normal.         Thought Content: Thought content normal.         Judgment: Judgment normal.          MDM      VITAL SIGNS:  Patient Vitals for the past 4 hrs:   Temp Pulse Resp BP SpO2   02/09/22 1124 97 °F (36.1 °C) 70 16 (!) 147/109 98 %         LABS:  No results found for this or any previous visit (from the past 6 hour(s)).      IMAGING:  XR KNEE LT 3 V   Final Result   Mild degenerative changes without acute abnormality. Medications During Visit:  Medications - No data to display      DECISION MAKING:  Alyce Spivey is a 42 y.o. female who comes in as above. X-ray shows mild degenerative changes. Pain exhibits no pain at rest, but pain with weightbearing and palpation around left patella tendon. Suspect patella tendinitis. Plan:  RICE  Partial weightbearing progressing to full weightbearing as tolerated  NSAIDs  PCP or Ortho f/u; referral provided per request      The clinical decision making for this encounter included ordering and interpreting the above diagnostic tests with comparison to prior studies that are within our EMR. Past medical and surgical histories were reviewed, as were records from recent outpatient and emergency department visits. The above results discussed and reviewed with the patient. Patient verbalized understanding of the care plan, including any changes to current outpatient medication regimen, discussed disease process, symptom control, and follow-up care. Return precautions reviewed. IMPRESSION:  1. Acute pain of left knee    2. Patellar tendinitis of left knee        DISPOSITION:  Discharged      Current Discharge Medication List      START taking these medications    Details   diclofenac EC (VOLTAREN) 75 mg EC tablet Take 1 Tablet by mouth two (2) times daily as needed for Pain for up to 15 days. Qty: 30 Tablet, Refills: 0  Start date: 2/9/2022, End date: 2/24/2022      famotidine (Pepcid) 20 mg tablet Take 1 Tablet by mouth two (2) times a day for 15 days.   Qty: 30 Tablet, Refills: 0  Start date: 2/9/2022, End date: 2/24/2022              Follow-up Information     Follow up With Specialties Details Why Contact Info    Arcadio Maza MD Family Medicine  As needed [address] Aniyah Dee MD Orthopedic Surgery Call in 2 days If symptoms worsen [address] [facility]  [phone]              The patient is asked to follow-up with their primary care provider in the next several days. They are to call tomorrow for an appointment. The patient is asked to return promptly for any increased concerns or worsening of symptoms. They can return to this emergency department or any other emergency department.       Procedures

## 2022-02-09 NOTE — Clinical Note
[address]  [address] [facility] [address]  [phone]    Work/School Note    Date: 2/9/2022    To Whom It May concern:      Zach Duckworth was seen and treated today in the emergency room by the following provider(s):  Attending Provider: Yadira Pina MD  Nurse Practitioner: Heraclio Marx NP. Zach Duckworth is excused from work/school on 02/09/22. She is clear to return to work/school on 02/10/22.         Sincerely,          Kristopher Harvey NP

## 2022-03-18 PROBLEM — E66.09 CLASS 1 OBESITY DUE TO EXCESS CALORIES WITHOUT SERIOUS COMORBIDITY WITH BODY MASS INDEX (BMI) OF 32.0 TO 32.9 IN ADULT: Status: ACTIVE | Noted: 2018-11-21

## 2022-03-18 PROBLEM — E66.811 CLASS 1 OBESITY DUE TO EXCESS CALORIES WITHOUT SERIOUS COMORBIDITY WITH BODY MASS INDEX (BMI) OF 32.0 TO 32.9 IN ADULT: Status: ACTIVE | Noted: 2018-11-21

## 2022-03-18 PROBLEM — R10.9 ABDOMINAL PAIN: Status: ACTIVE | Noted: 2020-08-19

## 2022-03-19 PROBLEM — K45.8 INTERNAL HERNIA: Status: ACTIVE | Noted: 2020-08-19

## 2022-03-19 PROBLEM — D64.9 ANEMIA: Status: ACTIVE | Noted: 2017-08-17

## 2022-03-19 PROBLEM — F51.02 ADJUSTMENT INSOMNIA: Status: ACTIVE | Noted: 2017-08-11

## 2022-03-19 PROBLEM — Z98.84 S/P GASTRIC BYPASS: Status: ACTIVE | Noted: 2017-08-11

## 2022-03-19 PROBLEM — N63.10 MASSES OF BOTH BREASTS: Status: ACTIVE | Noted: 2017-08-28

## 2022-03-19 PROBLEM — R41.840 SHORT ATTENTION SPAN: Status: ACTIVE | Noted: 2020-01-04

## 2022-03-19 PROBLEM — N63.20 MASSES OF BOTH BREASTS: Status: ACTIVE | Noted: 2017-08-28

## 2022-03-19 PROBLEM — R11.2 N&V (NAUSEA AND VOMITING): Status: ACTIVE | Noted: 2020-08-19

## 2022-07-22 ENCOUNTER — OFFICE VISIT (OUTPATIENT)
Dept: PRIMARY CARE CLINIC | Age: 37
End: 2022-07-22
Payer: COMMERCIAL

## 2022-07-22 VITALS
OXYGEN SATURATION: 99 % | WEIGHT: 188 LBS | HEIGHT: 63 IN | BODY MASS INDEX: 33.31 KG/M2 | TEMPERATURE: 98.1 F | DIASTOLIC BLOOD PRESSURE: 82 MMHG | SYSTOLIC BLOOD PRESSURE: 122 MMHG | RESPIRATION RATE: 18 BRPM | HEART RATE: 71 BPM

## 2022-07-22 DIAGNOSIS — F51.01 PRIMARY INSOMNIA: ICD-10-CM

## 2022-07-22 DIAGNOSIS — M72.2 PLANTAR FASCIITIS OF RIGHT FOOT: ICD-10-CM

## 2022-07-22 DIAGNOSIS — D50.8 OTHER IRON DEFICIENCY ANEMIA: ICD-10-CM

## 2022-07-22 DIAGNOSIS — E55.9 VITAMIN D DEFICIENCY: ICD-10-CM

## 2022-07-22 DIAGNOSIS — F17.210 CIGARETTE NICOTINE DEPENDENCE WITHOUT COMPLICATION: Primary | ICD-10-CM

## 2022-07-22 DIAGNOSIS — Z98.84 S/P GASTRIC BYPASS: ICD-10-CM

## 2022-07-22 PROBLEM — R11.2 N&V (NAUSEA AND VOMITING): Status: RESOLVED | Noted: 2020-08-19 | Resolved: 2022-07-22

## 2022-07-22 PROCEDURE — 99214 OFFICE O/P EST MOD 30 MIN: CPT | Performed by: FAMILY MEDICINE

## 2022-07-22 RX ORDER — CHOLECALCIFEROL (VITAMIN D3) 125 MCG
CAPSULE ORAL
COMMUNITY
End: 2022-07-22

## 2022-07-22 RX ORDER — LANOLIN ALCOHOL/MO/W.PET/CERES
CREAM (GRAM) TOPICAL
COMMUNITY

## 2022-07-22 RX ORDER — CHOLECALCIFEROL (VITAMIN D3) 125 MCG
CAPSULE ORAL
Status: CANCELLED | OUTPATIENT
Start: 2022-07-22

## 2022-07-22 RX ORDER — DOXEPIN HYDROCHLORIDE 10 MG/1
10 CAPSULE ORAL
Qty: 30 CAPSULE | Refills: 2 | Status: SHIPPED | OUTPATIENT
Start: 2022-07-22

## 2022-07-22 RX ORDER — CHOLECALCIFEROL TAB 125 MCG (5000 UNIT) 125 MCG
5000 TAB ORAL DAILY
COMMUNITY

## 2022-07-22 RX ORDER — VARENICLINE TARTRATE 25 MG
0.5 KIT ORAL 2 TIMES DAILY
Qty: 1 DOSE PACK | Refills: 2 | Status: SHIPPED | OUTPATIENT
Start: 2022-07-22

## 2022-07-22 NOTE — PROGRESS NOTES
Chief Complaint   Patient presents with    Establish Care    Sleep Problem     1. \"Have you been to the ER, urgent care clinic since your last visit? Hospitalized since your last visit? \" No    2. \"Have you seen or consulted any other health care providers outside of the [address] [place] [address] since your last visit? \" No     3. For patients aged 39-70: Has the patient had a colonoscopy / FIT/ Cologuard? NA - based on age      If the patient is female:    4. For patients aged 41-77: Has the patient had a mammogram within the past 2 years? NA - based on age or sex      11. For patients aged 21-65: Has the patient had a pap smear?  Yes - no Care Gap present

## 2022-07-22 NOTE — PROGRESS NOTES
HPI     Chief Complaint   Patient presents with    [facility]    Sleep Problem     She is a 42 y.o. female who presents to establish care. Hx gastric bypass. Hx abdominal hernia s/p surgical repair, partial small bowel resection. Hx anemia. C/o insomnia. This is a chronic problem, worse in recent months. Trouble falling asleep. Using alcohol and OTC sleep aids (antihistamines, melatonin) which are not very affective. Smokes ~10 cig per day, mostly in the evenings. Drinking usually 3-4 truely beverages per evening. Exercises, does crossfit, 3 days per week. Maintains a mostly healthy diet. Drinks 2 monster energy drinks per day due to her daytime sleepiness. Denies significant depressed mood or anhedonia. Does have quite a bit of life stressors, but does not feel overly anxious. Also c/o right foot pain, plantar foot distal to right heel. Worse with standing/walking, rocio on getting out of bed. Better with prolonged activity. Better with ice/elevation. No injury. Establishing Care  - Chronic medical problems:  Past Medical History:   Diagnosis Date    Anemia     Nausea & vomiting     Transient elevated blood pressure      - Current medications:   Current Outpatient Medications   Medication Sig    ferrous sulfate 325 mg (65 mg iron) tablet Take  by mouth Daily (before breakfast). cholecalciferol (VITAMIN D3) (5000 Units/125 mcg) tab tablet Take 5,000 Units by mouth in the morning. varenicline (Chantix Starting Month Box) 0.5 mg (11)- 1 mg (42) DsPk Take 0.5 mg by mouth two (2) times a day. Indications: stop smoking    doxepin (SINEquan) 10 mg capsule Take 1 Capsule by mouth nightly. albuterol (PROVENTIL HFA, VENTOLIN HFA, PROAIR HFA) 90 mcg/actuation inhaler Take 1 Puff by inhalation every four (4) hours as needed for Wheezing. (Patient not taking: Reported on 8/6/2021)     No current facility-administered medications for this visit.      - Family history:   Family History Problem Relation Age of Onset    Hypertension Mother     Diabetes Mother     Hypertension Father     Diabetes Father     Cancer Father         lung    Hypertension Brother     Other Brother     Hypertension Sister     Breast Cancer Paternal Grandmother         age unknown     - Allergies: Allergies   Allergen Reactions    Seafood [Iodine And Iodide Containing Products] Hives    Seafood [Shrimp] Hives     - Surgical history:   Past Surgical History:   Procedure Laterality Date    HX BREAST BIOPSY Bilateral     not sure of dates one 2009 not sure of other date    HX  SECTION  9/10    HX DILATION AND CURETTAGE      HX HERNIA REPAIR  2020    internal hernia repair Dr. Kvng Bennett     HX OTHER SURGICAL      gastric banding. HX SMALL BOWEL RESECTION  2020    [facility] ABDOMEN SURGERY PROC UNLISTED      gastric bypass    [facility] LAP,DIAGNOSTIC ABDOMEN  2020     - Social history (sexually active, occupation, smoker, etoh use, etc):   Social History     Socioeconomic History    Marital status:      Spouse name: Not on file    Number of children: Not on file    Years of education: Not on file    Highest education level: Not on file   Occupational History    Not on file   Tobacco Use    Smoking status: Every Day     Packs/day: 0.25     Years: 6.00     Pack years: 1.50     Types: Cigarettes    Smokeless tobacco: Never   Vaping Use    Vaping Use: Never used   Substance and Sexual Activity    Alcohol use:  Yes     Alcohol/week: 0.0 standard drinks     Comment: Daily    Drug use: Never    Sexual activity: Yes     Partners: Male   Other Topics Concern    Not on file   Social History Narrative    Not on file     Social Determinants of Health     Financial Resource Strain: Not on file   Food Insecurity: Not on file   Transportation Needs: Not on file   Physical Activity: Not on file   Stress: Not on file   Social Connections: Not on file   Intimate Partner Violence: Not on file   Housing Stability: Not on file Review of Systems  Denies fever, chills, chest pain, shortness of breath, abd pain, nausea, vomiting    Reviewed PmHx, RxHx, FmHx, SocHx, AllgHx and updated and dated in the chart. Physical Exam:  Visit Vitals  /82 (BP 1 Location: Right arm, BP Patient Position: Sitting)   Pulse 71   Temp 98.1 °F (36.7 °C) (Oral)   Resp 18   Ht 5' 3\" (1.6 m)   Wt 188 lb (85.3 kg)   LMP 07/06/2022   SpO2 99%   BMI 33.30 kg/m²     Physical Exam  Constitutional:       Appearance: Normal appearance. HENT:      Head: Normocephalic and atraumatic. Eyes:      Conjunctiva/sclera: Conjunctivae normal.      Pupils: Pupils are equal, round, and reactive to light. Cardiovascular:      Rate and Rhythm: Normal rate and regular rhythm. Pulses: Normal pulses. Pulmonary:      Effort: Pulmonary effort is normal.      Breath sounds: Normal breath sounds. Musculoskeletal:         General: No swelling. Cervical back: Neck supple. No tenderness. Comments: Reproducible pain/tenderness distal to right plantar heel. Lymphadenopathy:      Cervical: No cervical adenopathy. Skin:     General: Skin is warm and dry. Neurological:      Mental Status: She is alert. Psychiatric:         Mood and Affect: Mood normal.         Behavior: Behavior normal.         Thought Content: Thought content normal.         Judgment: Judgment normal.              Assessment / Plan     Diagnoses and all orders for this visit:    1. Cigarette nicotine dependence without complication   -     varenicline (Chantix Starting Month Box) 0.5 mg (11)- 1 mg (42) DsPk; Take 0.5 mg by mouth two (2) times a day. Indications: stop smoking    2. Other iron deficiency anemia : chronic and stable. Check lab and follow up as indicated. Cont iron/vitC supplement. -     CBC WITH AUTOMATED DIFF; Future  -     METABOLIC PANEL, COMPREHENSIVE; Future  -     VITAMIN D, 25 HYDROXY; Future    3. S/P gastric bypass  -     CBC WITH AUTOMATED DIFF;  Future  - METABOLIC PANEL, COMPREHENSIVE; Future  -     VITAMIN D, 25 HYDROXY; Future    4. Primary insomnia : Discussed sleep hygiene recommendations. Try rx as below. -     doxepin (SINEquan) 10 mg capsule; Take 1 Capsule by mouth nightly. 5. Vitamin D deficiency     6. Plantar fasciitis : Discussed conservative care recommendations. Applied ice, stretching exercises, foot support to decrease biomechanical stressors. Follow up if not improved within 2 weeks. I have discussed the diagnosis with the patient and the intended plan as seen in the above orders. The patient has received an after-visit summary and questions were answered concerning future plans. I have discussed medication side effects and warnings with the patient as well.     Rizwan Kelly, DO

## 2022-07-25 DIAGNOSIS — E55.9 VITAMIN D DEFICIENCY: ICD-10-CM

## 2022-07-25 DIAGNOSIS — D50.8 OTHER IRON DEFICIENCY ANEMIA: Primary | ICD-10-CM

## 2022-08-03 ENCOUNTER — TELEPHONE (OUTPATIENT)
Dept: PRIMARY CARE CLINIC | Age: 37
End: 2022-08-03

## 2022-08-03 ENCOUNTER — OFFICE VISIT (OUTPATIENT)
Dept: SURGERY | Age: 37
End: 2022-08-03
Payer: COMMERCIAL

## 2022-08-03 VITALS
SYSTOLIC BLOOD PRESSURE: 113 MMHG | HEART RATE: 72 BPM | DIASTOLIC BLOOD PRESSURE: 73 MMHG | TEMPERATURE: 98.9 F | RESPIRATION RATE: 18 BRPM | OXYGEN SATURATION: 97 % | BODY MASS INDEX: 33.13 KG/M2 | WEIGHT: 187 LBS | HEIGHT: 63 IN

## 2022-08-03 DIAGNOSIS — K45.0 OBSTRUCTED INTERNAL HERNIA: Primary | ICD-10-CM

## 2022-08-03 DIAGNOSIS — Z98.84 S/P GASTRIC BYPASS: ICD-10-CM

## 2022-08-03 PROCEDURE — 99203 OFFICE O/P NEW LOW 30 MIN: CPT | Performed by: SURGERY

## 2022-08-03 NOTE — PROGRESS NOTES
1. Have you been to the ER, urgent care clinic since your last visit? Hospitalized since your last visit? No    2. Have you seen or consulted any other health care providers outside of the [address] [place] [address] since your last visit? Include any pap smears or colon screening.  No

## 2022-08-03 NOTE — TELEPHONE ENCOUNTER
Patient called to follow-up with provider and let her know that the doxepin 10mg is not working for her.    Advised patient I would get a message to her pcp

## 2022-08-03 NOTE — LETTER
8/4/2022    Patient: Von Portillo   YOB: 1985   Date of Visit: 8/3/2022     Shelly Quiroga, [address]  [facility] [address]    Dear Shelly Quiroga DO,      Thank you for referring Ms. Von Portillo to [facility] for evaluation. My notes for this consultation are attached. If you have questions, please do not hesitate to call me. I look forward to following your patient along with you.       Sincerely,    Marlene Valentin MD

## 2022-08-04 NOTE — TELEPHONE ENCOUNTER
Spoke to patient advised of information to try taking 2 pills.  Patient will try that and call back with any other issues if needed

## 2022-08-04 NOTE — PROGRESS NOTES
[employer] [facility] at [facility] Surgery History and Physical    History of Present Illness:      Sunshine Sanchez is a 42 y.o. female who has a history of laparoscopic Jhonatan-en-Y gastric bypass many years ago by Dr. Luís Nguyen. I recently operated on her 2 years ago for an internal hernia that I repaired laparoscopically. We also did a small bowel resection of a piece of small intestine that was torn from the enterotomy. She has done well in general until recently when she has been having some mild back pain that feels similar to how her pain felt before. The pain is not as severe as it was before. The pain is a 1-2 out of 10. She denies any nausea or vomiting. Her bowel movements have been normal.  She has been eating and drinking well without any issues. Eating and drinking does not seem to cause the pain. The pain appears to be more with straining and lifting. Past Medical History:   Diagnosis Date    Anemia     Nausea & vomiting     Transient elevated blood pressure        Past Surgical History:   Procedure Laterality Date    HX BREAST BIOPSY Bilateral     not sure of dates one 2009 not sure of other date    HX  SECTION  9/10    HX DILATION AND CURETTAGE      HX HERNIA REPAIR  2020    internal hernia repair Dr. Garrett [place]     HX OTHER SURGICAL      gastric banding. HX SMALL BOWEL RESECTION  2020    [facility] ABDOMEN SURGERY PROC UNLISTED      gastric bypass    [facility] LAP,DIAGNOSTIC ABDOMEN  2020         Current Outpatient Medications:     ferrous sulfate 325 mg (65 mg iron) tablet, Take  by mouth Daily (before breakfast). , Disp: , Rfl:     cholecalciferol (VITAMIN D3) (5000 Units/125 mcg) tab tablet, Take 5,000 Units by mouth in the morning., Disp: , Rfl:     varenicline (Chantix Starting Month Box) 0.5 mg (11)- 1 mg (42) DsPk, Take 0.5 mg by mouth two (2) times a day.  Indications: stop smoking (Patient not taking: Reported on 8/3/2022), Disp: 1 Dose Pack, Rfl: 2 doxepin (SINEquan) 10 mg capsule, Take 1 Capsule by mouth nightly. (Patient not taking: Reported on 8/3/2022), Disp: 30 Capsule, Rfl: 2    Allergies   Allergen Reactions    Seafood [Iodine And Iodide Containing Products] Hives    Seafood [Shrimp] Hives       Social History     Socioeconomic History    Marital status:      Spouse name: Not on file    Number of children: Not on file    Years of education: Not on file    Highest education level: Not on file   Occupational History    Not on file   Tobacco Use    Smoking status: Every Day     Packs/day: 0.25     Years: 6.00     Pack years: 1.50     Types: Cigarettes    Smokeless tobacco: Never   Vaping Use    Vaping Use: Never used   Substance and Sexual Activity    Alcohol use:  Yes     Alcohol/week: 0.0 standard drinks     Comment: Daily    Drug use: Never    Sexual activity: Yes     Partners: Male   Other Topics Concern    Not on file   Social History Narrative    Not on file     Social Determinants of Health     Financial Resource Strain: Not on file   Food Insecurity: Not on file   Transportation Needs: Not on file   Physical Activity: Not on file   Stress: Not on file   Social Connections: Not on file   Intimate Partner Violence: Not on file   Housing Stability: Not on file       Family History   Problem Relation Age of Onset    Hypertension Mother     Diabetes Mother     Hypertension Father     Diabetes Father     Cancer Father         lung    Hypertension Brother     Other Brother     Hypertension Sister     Breast Cancer Paternal Grandmother         age unknown       ROS   Constitutional: negative  Ears, Nose, Mouth, Throat, and Face: negative  Respiratory: negative  Cardiovascular: negative  Gastrointestinal: negative  Genitourinary:negative  Integument/Breast: negative  Hematologic/Lymphatic: negative  Behavioral/Psychiatric: negative  Allergic/Immunologic: negative      Physical Exam:     Visit Vitals  /73 (BP 1 Location: Left arm, BP Patient Position: Sitting, BP Cuff Size: Adult)   Pulse 72   Temp 98.9 °F (37.2 °C) (Oral)   Resp 18   Ht 5' 3\" (1.6 m)   Wt 187 lb (84.8 kg)   SpO2 97%   BMI 33.13 kg/m²       General - alert and oriented, no apparent distress  HEENT - no jaundice, no hearing imparement  Pulm - CTAB, no C/W/R  CV - RRR, no M/R/G  Abd -soft, nondistended, bowel sounds present, nontender to palpation, incisions well-healed  Ext - pulses intact in UE and LE bilaterally, no edema  Skin - supple, no rashes  Psychiatric - normal affect, good mood    Labs  Lab Results   Component Value Date/Time    Sodium 137 07/22/2022 01:53 PM    Potassium 4.7 07/22/2022 01:53 PM    Chloride 104 07/22/2022 01:53 PM    CO2 27 07/22/2022 01:53 PM    Anion gap 6 07/22/2022 01:53 PM    Glucose 81 07/22/2022 01:53 PM    BUN 15 07/22/2022 01:53 PM    Creatinine 0.72 07/22/2022 01:53 PM    BUN/Creatinine ratio 21 (H) 07/22/2022 01:53 PM    GFR est AA >60 07/22/2022 01:53 PM    GFR est non-AA >60 07/22/2022 01:53 PM    Calcium 9.3 07/22/2022 01:53 PM    Bilirubin, total 0.2 07/22/2022 01:53 PM    Alk. phosphatase 51 07/22/2022 01:53 PM    Protein, total 7.6 07/22/2022 01:53 PM    Albumin 3.9 07/22/2022 01:53 PM    Globulin 3.7 07/22/2022 01:53 PM    A-G Ratio 1.1 07/22/2022 01:53 PM    ALT (SGPT) 21 07/22/2022 01:53 PM    AST (SGOT) 14 (L) 07/22/2022 01:53 PM     Lab Results   Component Value Date/Time    WBC 6.2 07/22/2022 01:53 PM    HGB 9.4 (L) 07/22/2022 01:53 PM    HCT 31.8 (L) 07/22/2022 01:53 PM    PLATELET 430 (H) 74/44/4098 01:53 PM    MCV 73.4 (L) 07/22/2022 01:53 PM         Imaging  CT scan pending  I have reviewed and agree with all of the pertinent images    Assessment:     Dell Bernabe is a 42 y.o. female with history of gastric bypass and history of internal hernia small bowel obstruction    Recommendations:     1. With her history of internal hernia and her symptoms resembling the previous issue but in a much milder form.   I will schedule her for CT scan to rule out internal hernia. It sounds like this pain is more of a musculoskeletal pain since she is having more pain with straining and lifting. She has been eating and drinking normal and this does not seem to cause the pain. I will call her after the CT scan results are done and make further plans then. If there are any abnormalities on the CT scan I would then schedule her for laparoscopic exploration. Kendall Singh MD    Greater than half of the time: 30 minutes was used in counciling the patient about diagnosis and treatment plan    Ms. Lindy Rodriguez has a reminder for a \"due or due soon\" health maintenance. I have asked that she contact her primary care provider for follow-up on this health maintenance.

## 2023-09-26 ENCOUNTER — OFFICE VISIT (OUTPATIENT)
Dept: PRIMARY CARE CLINIC | Facility: CLINIC | Age: 38
End: 2023-09-26
Payer: COMMERCIAL

## 2023-09-26 VITALS
TEMPERATURE: 97.5 F | RESPIRATION RATE: 16 BRPM | SYSTOLIC BLOOD PRESSURE: 128 MMHG | WEIGHT: 175.2 LBS | OXYGEN SATURATION: 100 % | HEIGHT: 63 IN | DIASTOLIC BLOOD PRESSURE: 78 MMHG | HEART RATE: 64 BPM | BODY MASS INDEX: 31.04 KG/M2

## 2023-09-26 DIAGNOSIS — Z98.84 S/P GASTRIC BYPASS: ICD-10-CM

## 2023-09-26 DIAGNOSIS — F51.04 CHRONIC INSOMNIA: ICD-10-CM

## 2023-09-26 DIAGNOSIS — F10.10 ALCOHOL ABUSE: ICD-10-CM

## 2023-09-26 DIAGNOSIS — F41.9 ANXIETY: ICD-10-CM

## 2023-09-26 DIAGNOSIS — E55.9 VITAMIN D DEFICIENCY, UNSPECIFIED: ICD-10-CM

## 2023-09-26 DIAGNOSIS — Z98.84 S/P GASTRIC BYPASS: Primary | ICD-10-CM

## 2023-09-26 LAB
BASOPHILS # BLD: 0.1 K/UL (ref 0–0.1)
BASOPHILS NFR BLD: 1 % (ref 0–1)
DIFFERENTIAL METHOD BLD: ABNORMAL
EOSINOPHIL # BLD: 0.1 K/UL (ref 0–0.4)
EOSINOPHIL NFR BLD: 2 % (ref 0–7)
ERYTHROCYTE [DISTWIDTH] IN BLOOD BY AUTOMATED COUNT: 18.6 % (ref 11.5–14.5)
HCT VFR BLD AUTO: 32.1 % (ref 35–47)
HGB BLD-MCNC: 9.3 G/DL (ref 11.5–16)
IMM GRANULOCYTES # BLD AUTO: 0 K/UL (ref 0–0.04)
IMM GRANULOCYTES NFR BLD AUTO: 0 % (ref 0–0.5)
LYMPHOCYTES # BLD: 1.4 K/UL (ref 0.8–3.5)
LYMPHOCYTES NFR BLD: 23 % (ref 12–49)
MCH RBC QN AUTO: 20.7 PG (ref 26–34)
MCHC RBC AUTO-ENTMCNC: 29 G/DL (ref 30–36.5)
MCV RBC AUTO: 71.5 FL (ref 80–99)
MONOCYTES # BLD: 0.9 K/UL (ref 0–1)
MONOCYTES NFR BLD: 14 % (ref 5–13)
NEUTS SEG # BLD: 3.8 K/UL (ref 1.8–8)
NEUTS SEG NFR BLD: 60 % (ref 32–75)
NRBC # BLD: 0 K/UL (ref 0–0.01)
NRBC BLD-RTO: 0 PER 100 WBC
PLATELET # BLD AUTO: 380 K/UL (ref 150–400)
PMV BLD AUTO: 10.4 FL (ref 8.9–12.9)
RBC # BLD AUTO: 4.49 M/UL (ref 3.8–5.2)
WBC # BLD AUTO: 6.2 K/UL (ref 3.6–11)

## 2023-09-26 PROCEDURE — 99214 OFFICE O/P EST MOD 30 MIN: CPT | Performed by: FAMILY MEDICINE

## 2023-09-26 RX ORDER — TRAZODONE HYDROCHLORIDE 50 MG/1
50 TABLET ORAL NIGHTLY
Qty: 30 TABLET | Refills: 1 | Status: SHIPPED | OUTPATIENT
Start: 2023-09-26

## 2023-09-26 SDOH — ECONOMIC STABILITY: TRANSPORTATION INSECURITY
IN THE PAST 12 MONTHS, HAS LACK OF TRANSPORTATION KEPT YOU FROM MEETINGS, WORK, OR FROM GETTING THINGS NEEDED FOR DAILY LIVING?: PATIENT DECLINED

## 2023-09-26 SDOH — ECONOMIC STABILITY: HOUSING INSECURITY
IN THE LAST 12 MONTHS, WAS THERE A TIME WHEN YOU DID NOT HAVE A STEADY PLACE TO SLEEP OR SLEPT IN A SHELTER (INCLUDING NOW)?: PATIENT REFUSED

## 2023-09-26 ASSESSMENT — PATIENT HEALTH QUESTIONNAIRE - PHQ9
SUM OF ALL RESPONSES TO PHQ QUESTIONS 1-9: 4
1. LITTLE INTEREST OR PLEASURE IN DOING THINGS: 2
2. FEELING DOWN, DEPRESSED OR HOPELESS: 2
SUM OF ALL RESPONSES TO PHQ9 QUESTIONS 1 & 2: 4

## 2023-09-27 ENCOUNTER — TELEPHONE (OUTPATIENT)
Dept: PRIMARY CARE CLINIC | Facility: CLINIC | Age: 38
End: 2023-09-27

## 2023-09-27 LAB
25(OH)D3 SERPL-MCNC: 20.3 NG/ML (ref 30–100)
ALBUMIN SERPL-MCNC: 3.9 G/DL (ref 3.5–5)
ALBUMIN/GLOB SERPL: 1.1 (ref 1.1–2.2)
ALP SERPL-CCNC: 48 U/L (ref 45–117)
ALT SERPL-CCNC: 24 U/L (ref 12–78)
ANION GAP SERPL CALC-SCNC: 5 MMOL/L (ref 5–15)
AST SERPL-CCNC: 15 U/L (ref 15–37)
BILIRUB SERPL-MCNC: 0.3 MG/DL (ref 0.2–1)
BUN SERPL-MCNC: 10 MG/DL (ref 6–20)
BUN/CREAT SERPL: 15 (ref 12–20)
CALCIUM SERPL-MCNC: 8.7 MG/DL (ref 8.5–10.1)
CHLORIDE SERPL-SCNC: 108 MMOL/L (ref 97–108)
CO2 SERPL-SCNC: 26 MMOL/L (ref 21–32)
CREAT SERPL-MCNC: 0.68 MG/DL (ref 0.55–1.02)
GLOBULIN SER CALC-MCNC: 3.5 G/DL (ref 2–4)
GLUCOSE SERPL-MCNC: 76 MG/DL (ref 65–100)
IRON SATN MFR SERPL: 2 % (ref 20–50)
IRON SERPL-MCNC: 12 UG/DL (ref 35–150)
POTASSIUM SERPL-SCNC: 4.6 MMOL/L (ref 3.5–5.1)
PROT SERPL-MCNC: 7.4 G/DL (ref 6.4–8.2)
SODIUM SERPL-SCNC: 139 MMOL/L (ref 136–145)
TIBC SERPL-MCNC: 484 UG/DL (ref 250–450)
TSH SERPL DL<=0.05 MIU/L-ACNC: 0.74 UIU/ML (ref 0.36–3.74)
VIT B12 SERPL-MCNC: 1311 PG/ML (ref 193–986)

## 2023-09-27 NOTE — TELEPHONE ENCOUNTER
Patient is calling in as she saw her labs were resulted on Mychart and wants to know what the Doctor Suggests.

## 2023-10-02 ENCOUNTER — TELEPHONE (OUTPATIENT)
Dept: PRIMARY CARE CLINIC | Facility: CLINIC | Age: 38
End: 2023-10-02

## 2023-10-02 DIAGNOSIS — D50.9 IRON DEFICIENCY ANEMIA, UNSPECIFIED IRON DEFICIENCY ANEMIA TYPE: ICD-10-CM

## 2023-10-02 NOTE — TELEPHONE ENCOUNTER
Called pt. Name and  verified. Let pt know the stool test process will be explained and supplies given at her next appt.

## 2023-10-30 ENCOUNTER — OFFICE VISIT (OUTPATIENT)
Dept: PRIMARY CARE CLINIC | Facility: CLINIC | Age: 38
End: 2023-10-30
Payer: COMMERCIAL

## 2023-10-30 VITALS
WEIGHT: 172.2 LBS | TEMPERATURE: 98 F | HEART RATE: 86 BPM | OXYGEN SATURATION: 100 % | SYSTOLIC BLOOD PRESSURE: 110 MMHG | DIASTOLIC BLOOD PRESSURE: 68 MMHG | BODY MASS INDEX: 30.51 KG/M2 | HEIGHT: 63 IN | RESPIRATION RATE: 17 BRPM

## 2023-10-30 DIAGNOSIS — D50.9 IRON DEFICIENCY ANEMIA, UNSPECIFIED IRON DEFICIENCY ANEMIA TYPE: Primary | ICD-10-CM

## 2023-10-30 DIAGNOSIS — F51.01 PRIMARY INSOMNIA: ICD-10-CM

## 2023-10-30 DIAGNOSIS — F41.9 ANXIETY: ICD-10-CM

## 2023-10-30 DIAGNOSIS — F10.10 ALCOHOL ABUSE: ICD-10-CM

## 2023-10-30 DIAGNOSIS — Z98.84 S/P GASTRIC BYPASS: ICD-10-CM

## 2023-10-30 DIAGNOSIS — D50.9 IRON DEFICIENCY ANEMIA, UNSPECIFIED IRON DEFICIENCY ANEMIA TYPE: ICD-10-CM

## 2023-10-30 PROCEDURE — 99214 OFFICE O/P EST MOD 30 MIN: CPT | Performed by: FAMILY MEDICINE

## 2023-10-30 RX ORDER — MIRTAZAPINE 7.5 MG/1
7.5 TABLET, FILM COATED ORAL NIGHTLY
Qty: 30 TABLET | Refills: 1 | Status: SHIPPED | OUTPATIENT
Start: 2023-10-30

## 2023-10-30 SDOH — ECONOMIC STABILITY: INCOME INSECURITY: HOW HARD IS IT FOR YOU TO PAY FOR THE VERY BASICS LIKE FOOD, HOUSING, MEDICAL CARE, AND HEATING?: PATIENT DECLINED

## 2023-10-30 SDOH — ECONOMIC STABILITY: FOOD INSECURITY: WITHIN THE PAST 12 MONTHS, THE FOOD YOU BOUGHT JUST DIDN'T LAST AND YOU DIDN'T HAVE MONEY TO GET MORE.: PATIENT DECLINED

## 2023-10-30 SDOH — ECONOMIC STABILITY: FOOD INSECURITY: WITHIN THE PAST 12 MONTHS, YOU WORRIED THAT YOUR FOOD WOULD RUN OUT BEFORE YOU GOT MONEY TO BUY MORE.: PATIENT DECLINED

## 2023-10-30 ASSESSMENT — PATIENT HEALTH QUESTIONNAIRE - PHQ9
SUM OF ALL RESPONSES TO PHQ QUESTIONS 1-9: 2
SUM OF ALL RESPONSES TO PHQ QUESTIONS 1-9: 2
2. FEELING DOWN, DEPRESSED OR HOPELESS: 1
SUM OF ALL RESPONSES TO PHQ QUESTIONS 1-9: 2
SUM OF ALL RESPONSES TO PHQ QUESTIONS 1-9: 2
SUM OF ALL RESPONSES TO PHQ9 QUESTIONS 1 & 2: 2
1. LITTLE INTEREST OR PLEASURE IN DOING THINGS: 1

## 2023-11-08 NOTE — PROGRESS NOTES
[facility] at [address], [facility], [address] [place], [place] [address]way: [phone]  F: [phone]    Reason for Visit:   Yolie Mcguire is a 34 y.o. female who is seen in consultation at the request of Dr. Bernardo Gregorio for evaluation of anemia. History of Present Illness:   Patient is a 40-year-old female with past medical history as below seen for evaluation of anemia. She has had longstanding anemia since . Her most recent hemoglobin on 2023 was 9.3 g/dL with an MCV of 71, additional work-up was sent and showed an iron saturation 2%. Now sent for further evaluation. She has received IV iron before. She has 4-5 days of menstrual bleeding, 6 pads a day. She has no blood in stool. Severe constipation with constipation. She has a h/o gastric bypass. Has fatigue. She does not crave ice. Past Medical History:   Diagnosis Date    Anemia     Nausea & vomiting     Transient elevated blood pressure       Past Surgical History:   Procedure Laterality Date    BREAST BIOPSY Bilateral     not sure of dates one 2009 not sure of other date     SECTION  9/10    DILATION AND CURETTAGE OF UTERUS      HERNIA REPAIR  2020    internal hernia repair Dr. Nataly Mcbride  2020    OTHER SURGICAL HISTORY      gastric banding. [place] UNLISTED PROCEDURE ABDOMEN PERITONEUM & OMENTUM      gastric bypass    SMALL INTESTINE SURGERY  2020    US BREAST BIOPSY W LOC DEVICE 1ST LESION LEFT Left 2020    US BREAST NEEDLE BIOPSY LEFT 2020 [facility] US    US BREAST BIOPSY W LOC DEVICE 1ST LESION RIGHT Right 2020    US BREAST NEEDLE BIOPSY RIGHT 2020 [facility] US      Social History     Tobacco Use    Smoking status: Every Day     Packs/day: .25     Types: Cigarettes    Smokeless tobacco: Never   Substance Use Topics    Alcohol use:  Yes     Alcohol/week: 0.0 standard drinks of alcohol      Family History   Problem Relation Age of Onset

## 2023-11-09 ENCOUNTER — OFFICE VISIT (OUTPATIENT)
Age: 38
End: 2023-11-09

## 2023-11-09 VITALS
HEART RATE: 82 BPM | OXYGEN SATURATION: 98 % | BODY MASS INDEX: 30.9 KG/M2 | WEIGHT: 174.4 LBS | TEMPERATURE: 98.3 F | HEIGHT: 63 IN | DIASTOLIC BLOOD PRESSURE: 84 MMHG | SYSTOLIC BLOOD PRESSURE: 136 MMHG | RESPIRATION RATE: 18 BRPM

## 2023-11-09 DIAGNOSIS — E61.1 IRON DEFICIENCY: Primary | ICD-10-CM

## 2023-11-09 DIAGNOSIS — Z98.84 S/P GASTRIC BYPASS: ICD-10-CM

## 2023-11-09 ASSESSMENT — PATIENT HEALTH QUESTIONNAIRE - PHQ9
2. FEELING DOWN, DEPRESSED OR HOPELESS: 1
1. LITTLE INTEREST OR PLEASURE IN DOING THINGS: 1
SUM OF ALL RESPONSES TO PHQ QUESTIONS 1-9: 2
SUM OF ALL RESPONSES TO PHQ9 QUESTIONS 1 & 2: 2

## 2023-11-13 DIAGNOSIS — D50.9 IRON DEFICIENCY ANEMIA, UNSPECIFIED IRON DEFICIENCY ANEMIA TYPE: ICD-10-CM

## 2023-11-13 RX ORDER — ONDANSETRON 2 MG/ML
8 INJECTION INTRAMUSCULAR; INTRAVENOUS
OUTPATIENT
Start: 2023-11-28

## 2023-11-13 RX ORDER — SODIUM CHLORIDE 0.9 % (FLUSH) 0.9 %
5-40 SYRINGE (ML) INJECTION PRN
OUTPATIENT
Start: 2023-11-28

## 2023-11-13 RX ORDER — ACETAMINOPHEN 325 MG/1
650 TABLET ORAL
OUTPATIENT
Start: 2023-11-28

## 2023-11-13 RX ORDER — FAMOTIDINE 10 MG/ML
20 INJECTION, SOLUTION INTRAVENOUS
OUTPATIENT
Start: 2023-11-28

## 2023-11-13 RX ORDER — SODIUM CHLORIDE 9 MG/ML
5-250 INJECTION, SOLUTION INTRAVENOUS PRN
OUTPATIENT
Start: 2023-11-28

## 2023-11-13 RX ORDER — ALBUTEROL SULFATE 90 UG/1
4 AEROSOL, METERED RESPIRATORY (INHALATION) PRN
OUTPATIENT
Start: 2023-11-28

## 2023-11-13 RX ORDER — SODIUM CHLORIDE 9 MG/ML
INJECTION, SOLUTION INTRAVENOUS CONTINUOUS
OUTPATIENT
Start: 2023-11-28

## 2023-11-13 RX ORDER — DIPHENHYDRAMINE HYDROCHLORIDE 50 MG/ML
50 INJECTION INTRAMUSCULAR; INTRAVENOUS
OUTPATIENT
Start: 2023-11-28

## 2023-11-13 RX ORDER — EPINEPHRINE 1 MG/ML
0.3 INJECTION, SOLUTION, CONCENTRATE INTRAVENOUS PRN
OUTPATIENT
Start: 2023-11-28

## 2023-11-13 RX ORDER — HEPARIN SODIUM (PORCINE) LOCK FLUSH IV SOLN 100 UNIT/ML 100 UNIT/ML
500 SOLUTION INTRAVENOUS PRN
OUTPATIENT
Start: 2023-11-28

## 2023-11-14 ENCOUNTER — TELEPHONE (OUTPATIENT)
Facility: HOSPITAL | Age: 38
End: 2023-11-14

## 2023-11-17 ENCOUNTER — CLINICAL DOCUMENTATION (OUTPATIENT)
Facility: HOSPITAL | Age: 38
End: 2023-11-17

## 2023-11-17 NOTE — PROGRESS NOTES
Patient Assistance    Met with: Spoke to patient by phone    Navigator Type:  Infusion  Documentation Type: New Patient  Contact Type: Telephone  Navigation Status: Copay Enrollment  Status of Patient Insurance Coverage: Patient has active coverage          Additional notes:     Drug Name: Injectafer  Form of PAP Assistance: Copay

## 2023-11-28 ENCOUNTER — HOSPITAL ENCOUNTER (OUTPATIENT)
Facility: HOSPITAL | Age: 38
Setting detail: INFUSION SERIES
Discharge: HOME | End: 2023-11-28
Payer: COMMERCIAL

## 2023-11-28 VITALS
RESPIRATION RATE: 18 BRPM | TEMPERATURE: 98.7 F | HEART RATE: 68 BPM | WEIGHT: 167.6 LBS | HEIGHT: 63 IN | BODY MASS INDEX: 29.7 KG/M2 | SYSTOLIC BLOOD PRESSURE: 136 MMHG | DIASTOLIC BLOOD PRESSURE: 80 MMHG

## 2023-11-28 DIAGNOSIS — D50.9 IRON DEFICIENCY ANEMIA, UNSPECIFIED IRON DEFICIENCY ANEMIA TYPE: Primary | ICD-10-CM

## 2023-11-28 PROCEDURE — 2580000003 HC RX 258: Performed by: NURSE PRACTITIONER

## 2023-11-28 PROCEDURE — 96365 THER/PROPH/DIAG IV INF INIT: CPT

## 2023-11-28 PROCEDURE — 6360000002 HC RX W HCPCS: Performed by: NURSE PRACTITIONER

## 2023-11-28 RX ORDER — ALBUTEROL SULFATE 90 UG/1
4 AEROSOL, METERED RESPIRATORY (INHALATION) PRN
Status: DISCONTINUED | OUTPATIENT
Start: 2023-11-28 | End: 2023-11-29 | Stop reason: HOSPADM

## 2023-11-28 RX ORDER — EPINEPHRINE 1 MG/ML
0.3 INJECTION, SOLUTION INTRAMUSCULAR; SUBCUTANEOUS PRN
Status: DISCONTINUED | OUTPATIENT
Start: 2023-11-28 | End: 2023-11-29 | Stop reason: HOSPADM

## 2023-11-28 RX ORDER — ONDANSETRON 2 MG/ML
8 INJECTION INTRAMUSCULAR; INTRAVENOUS
OUTPATIENT
Start: 2023-12-05

## 2023-11-28 RX ORDER — SODIUM CHLORIDE 9 MG/ML
INJECTION, SOLUTION INTRAVENOUS CONTINUOUS
Status: DISCONTINUED | OUTPATIENT
Start: 2023-11-28 | End: 2023-11-29 | Stop reason: HOSPADM

## 2023-11-28 RX ORDER — SODIUM CHLORIDE 9 MG/ML
INJECTION, SOLUTION INTRAVENOUS CONTINUOUS
OUTPATIENT
Start: 2023-12-05

## 2023-11-28 RX ORDER — SODIUM CHLORIDE 0.9 % (FLUSH) 0.9 %
5-40 SYRINGE (ML) INJECTION PRN
Status: DISCONTINUED | OUTPATIENT
Start: 2023-11-28 | End: 2023-11-29 | Stop reason: HOSPADM

## 2023-11-28 RX ORDER — DIPHENHYDRAMINE HYDROCHLORIDE 50 MG/ML
50 INJECTION INTRAMUSCULAR; INTRAVENOUS
OUTPATIENT
Start: 2023-12-05

## 2023-11-28 RX ORDER — SODIUM CHLORIDE 0.9 % (FLUSH) 0.9 %
5-40 SYRINGE (ML) INJECTION PRN
OUTPATIENT
Start: 2023-12-05

## 2023-11-28 RX ORDER — SODIUM CHLORIDE 9 MG/ML
5-250 INJECTION, SOLUTION INTRAVENOUS PRN
Status: DISCONTINUED | OUTPATIENT
Start: 2023-11-28 | End: 2023-11-29 | Stop reason: HOSPADM

## 2023-11-28 RX ORDER — SODIUM CHLORIDE 9 MG/ML
5-250 INJECTION, SOLUTION INTRAVENOUS PRN
OUTPATIENT
Start: 2023-12-05

## 2023-11-28 RX ORDER — ALBUTEROL SULFATE 90 UG/1
4 AEROSOL, METERED RESPIRATORY (INHALATION) PRN
OUTPATIENT
Start: 2023-12-05

## 2023-11-28 RX ORDER — SODIUM CHLORIDE 9 MG/ML
5-250 INJECTION, SOLUTION INTRAVENOUS PRN
Status: CANCELLED | OUTPATIENT
Start: 2023-12-05

## 2023-11-28 RX ORDER — EPINEPHRINE 1 MG/ML
0.3 INJECTION, SOLUTION INTRAMUSCULAR; SUBCUTANEOUS PRN
OUTPATIENT
Start: 2023-12-05

## 2023-11-28 RX ORDER — DIPHENHYDRAMINE HYDROCHLORIDE 50 MG/ML
50 INJECTION INTRAMUSCULAR; INTRAVENOUS
Status: DISCONTINUED | OUTPATIENT
Start: 2023-11-28 | End: 2023-11-29 | Stop reason: HOSPADM

## 2023-11-28 RX ORDER — ACETAMINOPHEN 325 MG/1
650 TABLET ORAL
Status: DISCONTINUED | OUTPATIENT
Start: 2023-11-28 | End: 2023-11-29 | Stop reason: HOSPADM

## 2023-11-28 RX ORDER — ACETAMINOPHEN 325 MG/1
650 TABLET ORAL
OUTPATIENT
Start: 2023-12-05

## 2023-11-28 RX ORDER — HEPARIN 100 UNIT/ML
500 SYRINGE INTRAVENOUS PRN
OUTPATIENT
Start: 2023-12-05

## 2023-11-28 RX ORDER — ONDANSETRON 2 MG/ML
8 INJECTION INTRAMUSCULAR; INTRAVENOUS
Status: DISCONTINUED | OUTPATIENT
Start: 2023-11-28 | End: 2023-11-29 | Stop reason: HOSPADM

## 2023-11-28 RX ADMIN — SODIUM CHLORIDE 25 ML/HR: 9 INJECTION, SOLUTION INTRAVENOUS at 13:25

## 2023-11-28 RX ADMIN — FERRIC CARBOXYMALTOSE INJECTION 750 MG: 50 INJECTION, SOLUTION INTRAVENOUS at 14:06

## 2023-11-28 ASSESSMENT — PAIN SCALES - GENERAL: PAINLEVEL_OUTOF10: 0

## 2023-12-02 LAB — HEMOCCULT STL QL IA: NEGATIVE

## 2023-12-05 ENCOUNTER — HOSPITAL ENCOUNTER (OUTPATIENT)
Facility: HOSPITAL | Age: 38
Setting detail: INFUSION SERIES
Discharge: HOME | End: 2023-12-05
Payer: COMMERCIAL

## 2023-12-05 VITALS
HEART RATE: 68 BPM | RESPIRATION RATE: 18 BRPM | DIASTOLIC BLOOD PRESSURE: 72 MMHG | TEMPERATURE: 97.4 F | SYSTOLIC BLOOD PRESSURE: 121 MMHG

## 2023-12-05 DIAGNOSIS — D50.9 IRON DEFICIENCY ANEMIA, UNSPECIFIED IRON DEFICIENCY ANEMIA TYPE: Primary | ICD-10-CM

## 2023-12-05 PROCEDURE — 96365 THER/PROPH/DIAG IV INF INIT: CPT

## 2023-12-05 PROCEDURE — 2580000003 HC RX 258: Performed by: NURSE PRACTITIONER

## 2023-12-05 PROCEDURE — 6360000002 HC RX W HCPCS: Performed by: NURSE PRACTITIONER

## 2023-12-05 RX ORDER — ONDANSETRON 2 MG/ML
8 INJECTION INTRAMUSCULAR; INTRAVENOUS
OUTPATIENT
Start: 2023-12-05

## 2023-12-05 RX ORDER — SODIUM CHLORIDE 9 MG/ML
5-250 INJECTION, SOLUTION INTRAVENOUS PRN
OUTPATIENT
Start: 2023-12-05

## 2023-12-05 RX ORDER — SODIUM CHLORIDE 9 MG/ML
INJECTION, SOLUTION INTRAVENOUS CONTINUOUS
OUTPATIENT
Start: 2023-12-05

## 2023-12-05 RX ORDER — SODIUM CHLORIDE 9 MG/ML
5-250 INJECTION, SOLUTION INTRAVENOUS PRN
Status: CANCELLED | OUTPATIENT
Start: 2023-12-05

## 2023-12-05 RX ORDER — SODIUM CHLORIDE 9 MG/ML
5-250 INJECTION, SOLUTION INTRAVENOUS PRN
Status: DISCONTINUED | OUTPATIENT
Start: 2023-12-05 | End: 2023-12-06 | Stop reason: HOSPADM

## 2023-12-05 RX ORDER — SODIUM CHLORIDE 0.9 % (FLUSH) 0.9 %
5-40 SYRINGE (ML) INJECTION PRN
OUTPATIENT
Start: 2023-12-05

## 2023-12-05 RX ORDER — EPINEPHRINE 1 MG/ML
0.3 INJECTION, SOLUTION INTRAMUSCULAR; SUBCUTANEOUS PRN
OUTPATIENT
Start: 2023-12-05

## 2023-12-05 RX ORDER — DIPHENHYDRAMINE HYDROCHLORIDE 50 MG/ML
50 INJECTION INTRAMUSCULAR; INTRAVENOUS
OUTPATIENT
Start: 2023-12-05

## 2023-12-05 RX ORDER — HEPARIN 100 UNIT/ML
500 SYRINGE INTRAVENOUS PRN
OUTPATIENT
Start: 2023-12-05

## 2023-12-05 RX ORDER — ACETAMINOPHEN 325 MG/1
650 TABLET ORAL
OUTPATIENT
Start: 2023-12-05

## 2023-12-05 RX ORDER — ALBUTEROL SULFATE 90 UG/1
4 AEROSOL, METERED RESPIRATORY (INHALATION) PRN
OUTPATIENT
Start: 2023-12-05

## 2023-12-05 RX ADMIN — FERRIC CARBOXYMALTOSE INJECTION 750 MG: 50 INJECTION, SOLUTION INTRAVENOUS at 14:46

## 2023-12-05 RX ADMIN — SODIUM CHLORIDE 50 ML/HR: 9 INJECTION, SOLUTION INTRAVENOUS at 14:44

## 2023-12-05 ASSESSMENT — PAIN SCALES - GENERAL: PAINLEVEL_OUTOF10: 0

## 2023-12-12 ENCOUNTER — APPOINTMENT (OUTPATIENT)
Facility: HOSPITAL | Age: 38
End: 2023-12-12
Payer: COMMERCIAL

## 2024-01-08 RX ORDER — MIRTAZAPINE 7.5 MG/1
7.5 TABLET, FILM COATED ORAL NIGHTLY
Qty: 30 TABLET | Refills: 1 | Status: SHIPPED | OUTPATIENT
Start: 2024-01-08

## 2024-01-08 NOTE — TELEPHONE ENCOUNTER
PCP: Rohini Renteria DO    Last Visit 10/30/2023   Future Appointments   Date Time Provider Department Center   4/9/2024 10:00 AM [facility]       Requested Prescriptions     Pending Prescriptions Disp Refills    mirtazapine (REMERON) 7.5 MG tablet [Pharmacy Med Name: MIRTAZAPINE 7.5MG TABLETS] 30 tablet 1     Sig: TAKE 1 TABLET BY MOUTH EVERY NIGHT         Other Comments: Last Refill   10/30/23

## 2024-02-06 NOTE — ED NOTES
Appointment has been scheduled   Discharge instructions reviewed by provider; pt verbalized understanding. VSS. NAD. Pt ambulatory from ED with steady gait.

## 2024-04-02 ENCOUNTER — TELEPHONE (OUTPATIENT)
Age: 39
End: 2024-04-02

## 2024-04-02 DIAGNOSIS — E61.1 IRON DEFICIENCY: Primary | ICD-10-CM

## 2024-04-02 DIAGNOSIS — E61.1 IRON DEFICIENCY: ICD-10-CM

## 2024-04-02 NOTE — TELEPHONE ENCOUNTER
[address] [facility]    ACUTE REHABILITATION OCCUPATIONAL THERAPY  DAILY NOTE    Date: 21  Patient Name: Celia Lundberg      Room: 1420/9121-29    MRN: [id number]   : 1960  (59 y.o.)  Gender: female   Referring Practitioner: Kolby Siegel MD  Diagnosis: CVA  Additional Pertinent Hx: Presented with acute right arm weakness and mental status change. MRI brain showed probable acute to subacute lacunar infarct of the left frontal lobe. AURELIA on CKD stage 4 likely d/t contrast induced nephropathy. Dialysis was initiated on 21, had 2 treatment session, labs improving. Pt. has history of diabetes mellitus type II, CAD s/p CABG (), cervical stenosis, back pain, lymphedema, diastolic CHF, DVT, urinary retention, and iron deficiency anemia. Pt. admitted to ARU from [place] [id number] 21. Restrictions  Restrictions/Precautions: Fall Risk  Required Braces or Orthoses  Right Lower Extremity Brace:  ( Lymphedema brace )  Left Lower Extremity Brace:  ( Lymphedema brace )  Required Braces or Orthoses?: Yes (B lymphedema wraps)    Subjective  Patient Currently in Pain: Yes  Pain Level: 5  Pain Location: Back  Pain Orientation: Lower  Restrictions/Precautions: Fall Risk        Pain Assessment  Pain Assessment: 0-10  Pain Level: 5  Pain Type: Chronic pain  Pain Location: Back  Pain Orientation: Lower  Pain Descriptors: Sore  Pain Frequency: Continuous  Clinical Progression: Not changed    Objective     Balance  Standing Balance: Stand by assistance  Bed mobility  Supine to Sit: Supervision  Comment: HOB slightly elevated  Transfers  Sit to stand: Stand by assistance  Stand to sit: Stand by assistance  Transfer Comments: Verbal cues for hand placement and safety  Standing Balance  Time: 1-2 minutes x 4  Activity: Functional mobilty, functional transfers, toileting, lower body dressing, laundry task  Comment: with RW. no LOB noted.    Functional Mobility  Functional - Mobility Device: Rolling TC with patient. Spoke with pt in regards to rescheduling 04/09 appt. Appt rescheduled to 05/10 @ 11:45am. Patient stated she cannot remember what labs she needs to get done prior to next appt. Please advise.    #[phone]   Walker  Activity: To/from bathroom  Assist Level: Stand by assistance  Functional Mobility Comments: Verbal cues for hand placement and safety  Toilet Transfers  Toilet - Technique: Ambulating  Equipment Used: Raised toilet seat with rails  Toilet Transfer: Stand by assistance     Type of ROM/Therapeutic Exercise  Type of ROM/Therapeutic Exercise: Free weights  Comment: Pt completed BUE exercises with use of 2# free weight for 20 reps. Pt required rest breaks as needed due to fatige. Pt completed activity to increase strength and overall endurance to increase safety and independence with ADL and IADL tasks. Exercises  Scapular Protraction: x  Scapular Retraction: x  Horizontal ABduction: x  Horizontal ADduction: x  Elbow Flexion: x  Elbow Extension: x              Light Housekeeping  Light Housekeeping Level of Assistance: Stand by assistance  Light Housekeeping: Pt completed laundry task of standing at the washer, placing dirty clothing into the washer, pouring  into the washer, placing detergent pod, and setting washer to run. Pt able to complete task without any seated rest breaks at this time. ADL  Grooming: Modified independent  (sitting in wheelchair at sink side)  UE Bathing: Setup  LE Bathing: None  UE Dressing: Setup  LE Dressing: Minimal assistance (assist with donning lymphedema wraps, educ use of reacher. )  Toileting: Stand by assistance  Additional Comments: Pt completed self care tasks while sitting in wheelchair at sink side. Pt re-educated with use of reacher to A with donning lymphedema wraps with pt demosntrateing increased independence donning lymphedema wraps with use of reacher. Pt demosntrated good breathing during self care tasks.       Instrumental ADL's  Instrumental ADLs: Yes  Light Housekeeping  Light Housekeeping Level of Assistance: Stand by assistance  Light Housekeeping: Pt completed laundry task of standing at the washer, placing dirty clothing into the washer, pouring  into the washer, placing detergent pod, and setting washer to run. Pt able to complete task without any seated rest breaks at this time. Assessment  Performance deficits / Impairments: Decreased ADL status; Decreased functional mobility ; Decreased strength;Decreased safe awareness;Decreased cognition;Decreased endurance;Decreased balance;Decreased high-level IADLs;Decreased coordination  Prognosis: Good  Discharge Recommendations: Patient would benefit from continued therapy after discharge;Home with assist PRN  Activity Tolerance: Patient Tolerated treatment well  Safety Devices in place: Yes  Type of devices: Left in chair;Call light within reach; All fall risk precautions in place          Patient Education: safety with transfers, AE to increase independence with self care tasks, BUE exercises  Patient Goals   Patient goals : \"I would like to have energy and balance and to just be able to walk around and go to the store and walk around. \"   Learner:patient  Method: explanation       Outcome: demonstrated understanding        Plan  Plan  Times per week: 5-7  Times per day: Twice a day  Current Treatment Recommendations: Self-Care / ADL, Strengthening, ROM, Balance Training, Functional Mobility Training, Endurance Training, Neuromuscular Re-education, Cognitive Reorientation, Pain Management, Safety Education & Training, Patient/Caregiver Education & Training, Equipment Evaluation, Education, & procurement, Home Management Training, Cognitive/Perceptual Training  Patient Goals   Patient goals : \"I would like to have energy and balance and to just be able to walk around and go to the store and walk around. \"   Short term goals  Time Frame for Short term goals: By 5-6 days  Short term goal 1: Pt will complete lower body dressing with CGA and good safety  Short term goal 2: Pt will complete functional trasnfers/mobility during self care tasks with supervision and good safety with use of least restrictive device  Short term goal 3: Pt will tolerate standing 5+ minutes during functional activity of choice with good safety   Short term goal 4: Pt will verbalize/demonstrate good understanding of energy conservation techniques to increase safety and independence with self care tasks  Short term goal 5: Pt will participate in 15+ minutes of therapeutic exercises/functional activities to increase safety and independence with self care tasks. Long term goals  Time Frame for Long term goals : By discharge  Long term goal 1: Pt will complete BADLs with modified independence and good safety  Long term goal 2: Pt will complete functional transfers/mobility during self care tasks with modified independence and good safety with use of least restrictive device  Long term goal 3: Pt will tolerate standing 8+ minutes during functional activity of choice with good safety  Long term goal 4: Pt will complete simple meal prep/light house keeping task with modified independence and good safety   Long term goal 5: Pt will verbalize/demonstrate good understanding of home safety/fall prevention to increase safety and independence with self care tasks.    Long term goals 6: Pt will demonstrated increased  strength and coordination during self care tasks as evident by and an improvement on the 9 hole peg test by 3 seconds and increased  strength by 5#     08/18/21 0737   OT Individual Minutes   Time In 0737   Time Out 0849   Minutes 72   Minute Variance   Variance 18   Time Code Minutes    Timed Code Treatment Minutes 72 Minutes     Electronically signed by Jhon Muñiz OT on 8/18/21 at 2:45 PM EDT

## 2024-04-15 NOTE — TELEPHONE ENCOUNTER
PCP: Rohini Renteria DO    Last Visit 10/30/2023   Future Appointments   Date Time Provider Department Center   5/10/2024 11:45 AM Rosamaria Neri MD [facility] BS AMB       Requested Prescriptions     Pending Prescriptions Disp Refills    mirtazapine (REMERON) 7.5 MG tablet 30 tablet 1     Sig: Take 1 tablet by mouth nightly         Other Comments: Last Refill

## 2024-04-16 RX ORDER — MIRTAZAPINE 7.5 MG/1
7.5 TABLET, FILM COATED ORAL NIGHTLY
Qty: 30 TABLET | Refills: 1 | Status: SHIPPED | OUTPATIENT
Start: 2024-04-16

## 2024-04-25 RX ORDER — MIRTAZAPINE 7.5 MG/1
7.5 TABLET, FILM COATED ORAL NIGHTLY
Qty: 30 TABLET | Refills: 1 | Status: SHIPPED | OUTPATIENT
Start: 2024-04-25

## 2024-04-25 NOTE — TELEPHONE ENCOUNTER
Patient called in about Refill, was sent on 4/16 but was printed and not sent to the pharmacy. Will re-send to the pharmacy now.

## 2024-05-04 LAB
BASOPHILS # BLD AUTO: 0 X10E3/UL (ref 0–0.2)
BASOPHILS NFR BLD AUTO: 1 %
EOSINOPHIL # BLD AUTO: 0.1 X10E3/UL (ref 0–0.4)
EOSINOPHIL NFR BLD AUTO: 1 %
ERYTHROCYTE [DISTWIDTH] IN BLOOD BY AUTOMATED COUNT: 13 % (ref 11.7–15.4)
FERRITIN SERPL-MCNC: 69 NG/ML (ref 15–150)
HCT VFR BLD AUTO: 40.1 % (ref 34–46.6)
HGB BLD-MCNC: 13.4 G/DL (ref 11.1–15.9)
IMM GRANULOCYTES # BLD AUTO: 0 X10E3/UL (ref 0–0.1)
IMM GRANULOCYTES NFR BLD AUTO: 0 %
IRON SATN MFR SERPL: 23 % (ref 15–55)
IRON SERPL-MCNC: 70 UG/DL (ref 27–159)
LYMPHOCYTES # BLD AUTO: 1.5 X10E3/UL (ref 0.7–3.1)
LYMPHOCYTES NFR BLD AUTO: 23 %
MCH RBC QN AUTO: 31.8 PG (ref 26.6–33)
MCHC RBC AUTO-ENTMCNC: 33.4 G/DL (ref 31.5–35.7)
MCV RBC AUTO: 95 FL (ref 79–97)
MONOCYTES # BLD AUTO: 0.8 X10E3/UL (ref 0.1–0.9)
MONOCYTES NFR BLD AUTO: 12 %
NEUTROPHILS # BLD AUTO: 4.1 X10E3/UL (ref 1.4–7)
NEUTROPHILS NFR BLD AUTO: 63 %
PLATELET # BLD AUTO: 302 X10E3/UL (ref 150–450)
RBC # BLD AUTO: 4.21 X10E6/UL (ref 3.77–5.28)
TIBC SERPL-MCNC: 311 UG/DL (ref 250–450)
UIBC SERPL-MCNC: 241 UG/DL (ref 131–425)
WBC # BLD AUTO: 6.5 X10E3/UL (ref 3.4–10.8)

## 2024-05-10 ENCOUNTER — OFFICE VISIT (OUTPATIENT)
Age: 39
End: 2024-05-10

## 2024-05-10 VITALS
HEART RATE: 63 BPM | SYSTOLIC BLOOD PRESSURE: 126 MMHG | DIASTOLIC BLOOD PRESSURE: 80 MMHG | BODY MASS INDEX: 34.19 KG/M2 | OXYGEN SATURATION: 98 % | RESPIRATION RATE: 16 BRPM | TEMPERATURE: 98.2 F | WEIGHT: 193 LBS

## 2024-05-10 DIAGNOSIS — Z98.84 S/P GASTRIC BYPASS: ICD-10-CM

## 2024-05-10 DIAGNOSIS — E61.1 IRON DEFICIENCY: Primary | ICD-10-CM

## 2024-05-10 NOTE — PROGRESS NOTES
Manav Adam is a 39 y.o. female    Chief Complaint   Patient presents with    Follow-up     anemia       1. Have you been to the ER, urgent care clinic since your last visit?  Hospitalized since your last visit?No    2. Have you seen or consulted any other health care providers outside of the [facility] since your last visit?  Include any pap smears or colon screening. No

## 2024-05-10 NOTE — PROGRESS NOTES
[facility] [place] [facility]  [address] [facility], [address] [place] [address]  W: [phone]  F: [phone]    Reason for Visit:   Manav Adam is a 39 y.o. female who is seen for  anemia.  History of Present Illness:   Patient is a 38-year-old female with past medical history as below seen for evaluation of anemia.  She has had longstanding anemia since .  Her most recent hemoglobin on 2023 was 9.3 g/dL with an MCV of 71, additional work-up was sent and showed an iron saturation 2%.  Now sent for further evaluation. She has received IV iron before.   She has 4-5 days of menstrual bleeding, 6 pads a day. She has no blood in stool. Severe constipation with oral iron.   She received injectafer 2024  Comes for follow up   She has improved fatigue     Past Medical History:   Diagnosis Date    Anemia     Nausea & vomiting     Transient elevated blood pressure       Past Surgical History:   Procedure Laterality Date    BREAST BIOPSY Bilateral     not sure of dates one 2009 not sure of other date     SECTION  9/10    DILATION AND CURETTAGE OF UTERUS      HERNIA REPAIR  2020    internal hernia repair Dr. AUBREE Mccallum     LAP,DIAGNOSTIC ABDOMEN  2020    OTHER SURGICAL HISTORY      gastric banding.     RI UNLISTED PROCEDURE ABDOMEN PERITONEUM & OMENTUM      gastric bypass    SMALL INTESTINE SURGERY  2020    US BREAST BIOPSY W LOC DEVICE 1ST LESION LEFT Left 2020    US BREAST NEEDLE BIOPSY LEFT 2020 [facility] US    US BREAST BIOPSY W LOC DEVICE 1ST LESION RIGHT Right 2020    US BREAST NEEDLE BIOPSY RIGHT 2020 [facility] US      Social History     Tobacco Use    Smoking status: Some Days     Current packs/day: 0.25     Types: Cigarettes    Smokeless tobacco: Never   Substance Use Topics    Alcohol use: Yes     Alcohol/week: 0.0 standard drinks of alcohol      Family History   Problem Relation Age of Onset    Breast Cancer Paternal Grandmother

## 2024-06-26 RX ORDER — MIRTAZAPINE 7.5 MG/1
7.5 TABLET, FILM COATED ORAL NIGHTLY
Qty: 30 TABLET | Refills: 0 | Status: SHIPPED | OUTPATIENT
Start: 2024-06-26

## 2024-06-26 NOTE — TELEPHONE ENCOUNTER
PCP: Rohini Renteria DO    Last Visit 10/30/2023   Future Appointments   Date Time Provider Department Center   11/12/2024 11:45 AM Rosamaria Neri MD [facility] BS AMB       Requested Prescriptions     Pending Prescriptions Disp Refills    mirtazapine (REMERON) 7.5 MG tablet [Pharmacy Med Name: MIRTAZAPINE 7.5MG TABLETS] 30 tablet 1     Sig: TAKE 1 TABLET BY MOUTH EVERY NIGHT         Other Comments: Last Refill   04/25/2024

## 2024-09-18 ENCOUNTER — TELEPHONE (OUTPATIENT)
Dept: PRIMARY CARE CLINIC | Facility: CLINIC | Age: 39
End: 2024-09-18

## 2024-10-04 ENCOUNTER — OFFICE VISIT (OUTPATIENT)
Dept: PRIMARY CARE CLINIC | Facility: CLINIC | Age: 39
End: 2024-10-04

## 2024-10-04 VITALS
DIASTOLIC BLOOD PRESSURE: 80 MMHG | BODY MASS INDEX: 33.06 KG/M2 | HEART RATE: 71 BPM | SYSTOLIC BLOOD PRESSURE: 129 MMHG | HEIGHT: 63 IN | TEMPERATURE: 97.9 F | OXYGEN SATURATION: 95 % | RESPIRATION RATE: 16 BRPM | WEIGHT: 186.6 LBS

## 2024-10-04 DIAGNOSIS — F51.01 PRIMARY INSOMNIA: ICD-10-CM

## 2024-10-04 DIAGNOSIS — G89.29 CHRONIC PAIN OF BOTH KNEES: ICD-10-CM

## 2024-10-04 DIAGNOSIS — F10.10 ALCOHOL ABUSE: ICD-10-CM

## 2024-10-04 DIAGNOSIS — M25.561 CHRONIC PAIN OF BOTH KNEES: ICD-10-CM

## 2024-10-04 DIAGNOSIS — Z00.00 ROUTINE GENERAL MEDICAL EXAMINATION AT A HEALTH CARE FACILITY: ICD-10-CM

## 2024-10-04 DIAGNOSIS — Z98.84 S/P GASTRIC BYPASS: ICD-10-CM

## 2024-10-04 DIAGNOSIS — Z23 NEED FOR DIPHTHERIA-TETANUS-PERTUSSIS (TDAP) VACCINE: ICD-10-CM

## 2024-10-04 DIAGNOSIS — M25.562 CHRONIC PAIN OF BOTH KNEES: ICD-10-CM

## 2024-10-04 DIAGNOSIS — Z11.3 SCREEN FOR SEXUALLY TRANSMITTED DISEASES: Primary | ICD-10-CM

## 2024-10-04 DIAGNOSIS — D50.9 IRON DEFICIENCY ANEMIA, UNSPECIFIED IRON DEFICIENCY ANEMIA TYPE: ICD-10-CM

## 2024-10-04 DIAGNOSIS — Z13.220 SCREENING FOR HYPERLIPIDEMIA: ICD-10-CM

## 2024-10-04 LAB
ALBUMIN SERPL-MCNC: 4.2 G/DL (ref 3.5–5)
ALBUMIN/GLOB SERPL: 1.1 (ref 1.1–2.2)
ALP SERPL-CCNC: 65 U/L (ref 45–117)
ALT SERPL-CCNC: 27 U/L (ref 12–78)
ANION GAP SERPL CALC-SCNC: 8 MMOL/L (ref 2–12)
AST SERPL-CCNC: 21 U/L (ref 15–37)
BILIRUB SERPL-MCNC: 0.4 MG/DL (ref 0.2–1)
BUN SERPL-MCNC: 10 MG/DL (ref 6–20)
BUN/CREAT SERPL: 15 (ref 12–20)
CALCIUM SERPL-MCNC: 9.1 MG/DL (ref 8.5–10.1)
CHLORIDE SERPL-SCNC: 103 MMOL/L (ref 97–108)
CHOLEST SERPL-MCNC: 182 MG/DL
CO2 SERPL-SCNC: 27 MMOL/L (ref 21–32)
CREAT SERPL-MCNC: 0.65 MG/DL (ref 0.55–1.02)
ERYTHROCYTE [DISTWIDTH] IN BLOOD BY AUTOMATED COUNT: 13.6 % (ref 11.5–14.5)
GLOBULIN SER CALC-MCNC: 3.7 G/DL (ref 2–4)
GLUCOSE SERPL-MCNC: 74 MG/DL (ref 65–100)
HBV SURFACE AG SER QL: <0.1 INDEX
HBV SURFACE AG SER QL: NEGATIVE
HCT VFR BLD AUTO: 43.1 % (ref 35–47)
HCV AB SER IA-ACNC: 0.13 INDEX
HCV AB SERPL QL IA: NONREACTIVE
HDLC SERPL-MCNC: 119 MG/DL
HDLC SERPL: 1.5 (ref 0–5)
HGB BLD-MCNC: 14.4 G/DL (ref 11.5–16)
HIV 1+2 AB+HIV1 P24 AG SERPL QL IA: NONREACTIVE
HIV 1/2 RESULT COMMENT: NORMAL
LDLC SERPL CALC-MCNC: 50.4 MG/DL (ref 0–100)
MCH RBC QN AUTO: 31.7 PG (ref 26–34)
MCHC RBC AUTO-ENTMCNC: 33.4 G/DL (ref 30–36.5)
MCV RBC AUTO: 94.9 FL (ref 80–99)
NRBC # BLD: 0 K/UL (ref 0–0.01)
NRBC BLD-RTO: 0 PER 100 WBC
PLATELET # BLD AUTO: 301 K/UL (ref 150–400)
PMV BLD AUTO: 11 FL (ref 8.9–12.9)
POTASSIUM SERPL-SCNC: 4.1 MMOL/L (ref 3.5–5.1)
PROT SERPL-MCNC: 7.9 G/DL (ref 6.4–8.2)
RBC # BLD AUTO: 4.54 M/UL (ref 3.8–5.2)
SODIUM SERPL-SCNC: 138 MMOL/L (ref 136–145)
TRIGL SERPL-MCNC: 63 MG/DL
VLDLC SERPL CALC-MCNC: 12.6 MG/DL
WBC # BLD AUTO: 4.5 K/UL (ref 3.6–11)

## 2024-10-04 RX ORDER — HYDROXYZINE HYDROCHLORIDE 25 MG/1
25 TABLET, FILM COATED ORAL NIGHTLY
Qty: 30 TABLET | Refills: 4 | Status: SHIPPED | OUTPATIENT
Start: 2024-10-04 | End: 2024-11-03

## 2024-10-04 SDOH — ECONOMIC STABILITY: FOOD INSECURITY: WITHIN THE PAST 12 MONTHS, YOU WORRIED THAT YOUR FOOD WOULD RUN OUT BEFORE YOU GOT MONEY TO BUY MORE.: NEVER TRUE

## 2024-10-04 SDOH — ECONOMIC STABILITY: INCOME INSECURITY: HOW HARD IS IT FOR YOU TO PAY FOR THE VERY BASICS LIKE FOOD, HOUSING, MEDICAL CARE, AND HEATING?: NOT HARD AT ALL

## 2024-10-04 SDOH — ECONOMIC STABILITY: FOOD INSECURITY: WITHIN THE PAST 12 MONTHS, THE FOOD YOU BOUGHT JUST DIDN'T LAST AND YOU DIDN'T HAVE MONEY TO GET MORE.: NEVER TRUE

## 2024-10-04 ASSESSMENT — PATIENT HEALTH QUESTIONNAIRE - PHQ9
SUM OF ALL RESPONSES TO PHQ QUESTIONS 1-9: 0
SUM OF ALL RESPONSES TO PHQ9 QUESTIONS 1 & 2: 0
SUM OF ALL RESPONSES TO PHQ QUESTIONS 1-9: 0
2. FEELING DOWN, DEPRESSED OR HOPELESS: NOT AT ALL
1. LITTLE INTEREST OR PLEASURE IN DOING THINGS: NOT AT ALL

## 2024-10-04 NOTE — PROGRESS NOTES
Health Decision Maker has been checked with the patient          Chief Complaint   Patient presents with    Annual Exam     Numbness in right hand       \"Have you been to the ER, urgent care clinic since your last visit?  Hospitalized since your last visit?\"    NO    “Have you seen or consulted any other health care providers outside of [facility] since your last visit?”    NO                   Click Here for Release of Records Request  
Times Moved in the Last Year: Not on file     Homeless in the Last Year: No         Family History - reviewed:  Family History   Problem Relation Age of Onset    Breast Cancer Paternal Grandmother         age unknown    Hypertension Mother     Other Brother     Hypertension Sister     Diabetes Mother     Hypertension Father     Diabetes Father     Cancer Father         lung    Hypertension Brother          Immunizations - reviewed:   Immunization History   Administered Date(s) Administered    TDaP, ADACEL (age 10y-64y), BOOSTRIX (age 10y+), IM, 0.5mL 09/18/2012           ROS  CONSTITUTIONAL: Denies fever, chills, unintentional weight loss.  CARDIOVASCULAR: Denies chest pain.  RESPIRATORY: Denies cough, dyspnea.  GI: Denies abdominal pain, diarrhea, constipation, black or bloody stool.         Physical Exam  BP (!) 162/85 (Site: Left Upper Arm, Position: Sitting)   Pulse 71   Temp 97.9 °F (36.6 °C) (Temporal)   Resp 16   Ht 1.6 m (5' 3\")   Wt 84.6 kg (186 lb 9.6 oz)   SpO2 95%   BMI 33.05 kg/m²   Physical Exam  Vitals reviewed.   Constitutional:       Appearance: Normal appearance.   HENT:      Head: Normocephalic and atraumatic.      Right Ear: Tympanic membrane, ear canal and external ear normal.      Left Ear: Tympanic membrane, ear canal and external ear normal.   Cardiovascular:      Rate and Rhythm: Normal rate and regular rhythm.      Pulses: Normal pulses.      Heart sounds: Normal heart sounds.   Pulmonary:      Effort: Pulmonary effort is normal.      Breath sounds: Normal breath sounds.   Abdominal:      General: There is no distension.      Palpations: Abdomen is soft. There is no mass.      Tenderness: There is no abdominal tenderness.   Musculoskeletal:      Cervical back: Neck supple. No tenderness.      Right lower leg: No edema.      Left lower leg: No edema.   Lymphadenopathy:      Cervical: No cervical adenopathy.   Skin:     General: Skin is warm and dry.   Neurological:      General: No

## 2024-10-07 LAB
C TRACH RRNA SPEC QL NAA+PROBE: NEGATIVE
N GONORRHOEA RRNA SPEC QL NAA+PROBE: NEGATIVE
SPECIMEN SOURCE: NORMAL
T VAGINALIS RRNA SPEC QL NAA+PROBE: NEGATIVE

## 2024-11-06 ENCOUNTER — TELEPHONE (OUTPATIENT)
Age: 39
End: 2024-11-06

## 2024-11-06 NOTE — TELEPHONE ENCOUNTER
LVM letting pt know of need to have labs done prior to next OV on 11/12. Asked that pt call if she's unable to have labs done as we will need to move out her OV to give her time to complete labs. Will f/u.

## 2024-11-08 ENCOUNTER — TELEPHONE (OUTPATIENT)
Age: 39
End: 2024-11-08

## 2024-11-08 NOTE — TELEPHONE ENCOUNTER
LVM. Made patient aware ov appt scheduled for 11/12 needs to be rescheduled due to labs. Provided office number and instructed patient to call office to reschedule appt.    #[phone]

## 2024-11-10 DIAGNOSIS — E61.1 IRON DEFICIENCY: ICD-10-CM

## 2024-11-14 ENCOUNTER — TELEPHONE (OUTPATIENT)
Age: 39
End: 2024-11-14

## 2024-11-14 NOTE — TELEPHONE ENCOUNTER
HIPAA x2  SW pt to let her know about needing to have labs done and to please call our office once labs are completed. Pt stated she wants to go to  at [facility] in [place], will fax orders. Pt verbalized understanding and was appreciative of my call.

## 2024-11-16 LAB
BASOPHILS # BLD AUTO: 0 X10E3/UL (ref 0–0.2)
BASOPHILS NFR BLD AUTO: 1 %
EOSINOPHIL # BLD AUTO: 0.1 X10E3/UL (ref 0–0.4)
EOSINOPHIL NFR BLD AUTO: 3 %
ERYTHROCYTE [DISTWIDTH] IN BLOOD BY AUTOMATED COUNT: 13.1 % (ref 11.7–15.4)
FERRITIN SERPL-MCNC: 91 NG/ML (ref 15–150)
HCT VFR BLD AUTO: 42 % (ref 34–46.6)
HGB BLD-MCNC: 13.8 G/DL (ref 11.1–15.9)
IMM GRANULOCYTES # BLD AUTO: 0 X10E3/UL (ref 0–0.1)
IMM GRANULOCYTES NFR BLD AUTO: 1 %
IRON SATN MFR SERPL: 5 % (ref 15–55)
IRON SERPL-MCNC: 21 UG/DL (ref 27–159)
LYMPHOCYTES # BLD AUTO: 0.8 X10E3/UL (ref 0.7–3.1)
LYMPHOCYTES NFR BLD AUTO: 21 %
MCH RBC QN AUTO: 31.7 PG (ref 26.6–33)
MCHC RBC AUTO-ENTMCNC: 32.9 G/DL (ref 31.5–35.7)
MCV RBC AUTO: 97 FL (ref 79–97)
MONOCYTES # BLD AUTO: 0.8 X10E3/UL (ref 0.1–0.9)
MONOCYTES NFR BLD AUTO: 20 %
NEUTROPHILS # BLD AUTO: 2.1 X10E3/UL (ref 1.4–7)
NEUTROPHILS NFR BLD AUTO: 54 %
PLATELET # BLD AUTO: 283 X10E3/UL (ref 150–450)
RBC # BLD AUTO: 4.35 X10E6/UL (ref 3.77–5.28)
TIBC SERPL-MCNC: 382 UG/DL (ref 250–450)
UIBC SERPL-MCNC: 361 UG/DL (ref 131–425)
WBC # BLD AUTO: 3.9 X10E3/UL (ref 3.4–10.8)

## 2024-11-20 ENCOUNTER — TELEPHONE (OUTPATIENT)
Age: 39
End: 2024-11-20

## 2024-11-20 ENCOUNTER — CLINICAL DOCUMENTATION (OUTPATIENT)
Age: 39
End: 2024-11-20

## 2024-11-20 NOTE — PROGRESS NOTES
Please let her know that overall labs look good.  She does not have anemia but her iron stores are still very low.  I would suggest considering additional IV iron injections and then following up with labs in 6 months.  Sent to order labs

## 2024-11-20 NOTE — TELEPHONE ENCOUNTER
HIPAA x2  SW pt to let her know that Dr. Neri reviewed her labs and overall they look good. I also let her know she does not have anemia but her iron stores are still very low and that Dr. Neri would suggest considering additional IV iron injections and then following up with labs in 6 months. Pt stated she would be agreeable to receiving IV iron. Sent message to Dr. Neri to let her know. Pt verbalized understanding and was appreciative of my call.

## 2024-11-21 ENCOUNTER — TELEPHONE (OUTPATIENT)
Age: 39
End: 2024-11-21

## 2024-11-21 RX ORDER — HYDROCORTISONE SODIUM SUCCINATE 100 MG/2ML
100 INJECTION INTRAMUSCULAR; INTRAVENOUS
OUTPATIENT
Start: 2024-12-03

## 2024-11-21 RX ORDER — DIPHENHYDRAMINE HYDROCHLORIDE 50 MG/ML
50 INJECTION INTRAMUSCULAR; INTRAVENOUS
OUTPATIENT
Start: 2024-12-03

## 2024-11-21 RX ORDER — SODIUM CHLORIDE 9 MG/ML
5-250 INJECTION, SOLUTION INTRAVENOUS PRN
OUTPATIENT
Start: 2024-12-03

## 2024-11-21 RX ORDER — HEPARIN 100 UNIT/ML
500 SYRINGE INTRAVENOUS PRN
OUTPATIENT
Start: 2024-12-03

## 2024-11-21 RX ORDER — ONDANSETRON 2 MG/ML
8 INJECTION INTRAMUSCULAR; INTRAVENOUS
OUTPATIENT
Start: 2024-12-03

## 2024-11-21 RX ORDER — SODIUM CHLORIDE 0.9 % (FLUSH) 0.9 %
5-40 SYRINGE (ML) INJECTION PRN
OUTPATIENT
Start: 2024-12-03

## 2024-11-21 RX ORDER — EPINEPHRINE 1 MG/ML
0.3 INJECTION, SOLUTION, CONCENTRATE INTRAVENOUS PRN
OUTPATIENT
Start: 2024-12-03

## 2024-11-21 RX ORDER — ALBUTEROL SULFATE 90 UG/1
4 INHALANT RESPIRATORY (INHALATION) PRN
OUTPATIENT
Start: 2024-12-03

## 2024-11-21 RX ORDER — SODIUM CHLORIDE 9 MG/ML
INJECTION, SOLUTION INTRAVENOUS CONTINUOUS
OUTPATIENT
Start: 2024-12-03

## 2024-11-21 RX ORDER — ACETAMINOPHEN 325 MG/1
650 TABLET ORAL
OUTPATIENT
Start: 2024-12-03

## 2024-11-21 NOTE — TELEPHONE ENCOUNTER
Called left vm regarding scheduling venofer appts for next available. Provided updates and advised will also be in mychart. Provided c/b # if these are not suitable for patient.

## 2024-12-09 ENCOUNTER — HOSPITAL ENCOUNTER (OUTPATIENT)
Facility: HOSPITAL | Age: 39
Setting detail: INFUSION SERIES
Discharge: HOME | End: 2024-12-09
Payer: COMMERCIAL

## 2024-12-09 VITALS
RESPIRATION RATE: 18 BRPM | DIASTOLIC BLOOD PRESSURE: 73 MMHG | SYSTOLIC BLOOD PRESSURE: 127 MMHG | HEART RATE: 69 BPM | TEMPERATURE: 97.7 F

## 2024-12-09 DIAGNOSIS — D50.9 IRON DEFICIENCY ANEMIA, UNSPECIFIED IRON DEFICIENCY ANEMIA TYPE: Primary | ICD-10-CM

## 2024-12-09 PROCEDURE — 96374 THER/PROPH/DIAG INJ IV PUSH: CPT

## 2024-12-09 PROCEDURE — 6360000002 HC RX W HCPCS

## 2024-12-09 RX ORDER — EPINEPHRINE 1 MG/ML
0.3 INJECTION, SOLUTION INTRAMUSCULAR; SUBCUTANEOUS PRN
Status: DISCONTINUED | OUTPATIENT
Start: 2024-12-09 | End: 2024-12-10 | Stop reason: HOSPADM

## 2024-12-09 RX ORDER — SODIUM CHLORIDE 9 MG/ML
INJECTION, SOLUTION INTRAVENOUS CONTINUOUS
OUTPATIENT
Start: 2024-12-10

## 2024-12-09 RX ORDER — SODIUM CHLORIDE 9 MG/ML
INJECTION, SOLUTION INTRAVENOUS CONTINUOUS
Status: DISCONTINUED | OUTPATIENT
Start: 2024-12-09 | End: 2024-12-10 | Stop reason: HOSPADM

## 2024-12-09 RX ORDER — ACETAMINOPHEN 325 MG/1
650 TABLET ORAL
OUTPATIENT
Start: 2024-12-10

## 2024-12-09 RX ORDER — HYDROCORTISONE SODIUM SUCCINATE 100 MG/2ML
100 INJECTION INTRAMUSCULAR; INTRAVENOUS
Status: DISCONTINUED | OUTPATIENT
Start: 2024-12-09 | End: 2024-12-10 | Stop reason: HOSPADM

## 2024-12-09 RX ORDER — HYDROCORTISONE SODIUM SUCCINATE 100 MG/2ML
100 INJECTION INTRAMUSCULAR; INTRAVENOUS
OUTPATIENT
Start: 2024-12-10

## 2024-12-09 RX ORDER — ONDANSETRON 2 MG/ML
8 INJECTION INTRAMUSCULAR; INTRAVENOUS
Status: DISCONTINUED | OUTPATIENT
Start: 2024-12-09 | End: 2024-12-10 | Stop reason: HOSPADM

## 2024-12-09 RX ORDER — HEPARIN 100 UNIT/ML
500 SYRINGE INTRAVENOUS PRN
OUTPATIENT
Start: 2024-12-10

## 2024-12-09 RX ORDER — ALBUTEROL SULFATE 90 UG/1
4 INHALANT RESPIRATORY (INHALATION) PRN
Status: DISCONTINUED | OUTPATIENT
Start: 2024-12-09 | End: 2024-12-10 | Stop reason: HOSPADM

## 2024-12-09 RX ORDER — SODIUM CHLORIDE 9 MG/ML
5-250 INJECTION, SOLUTION INTRAVENOUS PRN
OUTPATIENT
Start: 2024-12-10

## 2024-12-09 RX ORDER — ACETAMINOPHEN 325 MG/1
650 TABLET ORAL
Status: DISCONTINUED | OUTPATIENT
Start: 2024-12-09 | End: 2024-12-10 | Stop reason: HOSPADM

## 2024-12-09 RX ORDER — ONDANSETRON 2 MG/ML
8 INJECTION INTRAMUSCULAR; INTRAVENOUS
OUTPATIENT
Start: 2024-12-10

## 2024-12-09 RX ORDER — DIPHENHYDRAMINE HYDROCHLORIDE 50 MG/ML
50 INJECTION INTRAMUSCULAR; INTRAVENOUS
Status: DISCONTINUED | OUTPATIENT
Start: 2024-12-09 | End: 2024-12-10 | Stop reason: HOSPADM

## 2024-12-09 RX ORDER — DIPHENHYDRAMINE HYDROCHLORIDE 50 MG/ML
50 INJECTION INTRAMUSCULAR; INTRAVENOUS
OUTPATIENT
Start: 2024-12-10

## 2024-12-09 RX ORDER — SODIUM CHLORIDE 9 MG/ML
5-250 INJECTION, SOLUTION INTRAVENOUS PRN
Status: DISCONTINUED | OUTPATIENT
Start: 2024-12-09 | End: 2024-12-10 | Stop reason: HOSPADM

## 2024-12-09 RX ORDER — ALBUTEROL SULFATE 90 UG/1
4 INHALANT RESPIRATORY (INHALATION) PRN
OUTPATIENT
Start: 2024-12-10

## 2024-12-09 RX ORDER — EPINEPHRINE 1 MG/ML
0.3 INJECTION, SOLUTION INTRAMUSCULAR; SUBCUTANEOUS PRN
OUTPATIENT
Start: 2024-12-10

## 2024-12-09 RX ORDER — SODIUM CHLORIDE 0.9 % (FLUSH) 0.9 %
5-40 SYRINGE (ML) INJECTION PRN
OUTPATIENT
Start: 2024-12-10

## 2024-12-09 RX ADMIN — IRON SUCROSE 200 MG: 20 INJECTION, SOLUTION INTRAVENOUS at 13:37

## 2024-12-09 ASSESSMENT — PAIN SCALES - GENERAL: PAINLEVEL_OUTOF10: 0

## 2024-12-09 NOTE — PLAN OF CARE
[facility] Short Note                       Date: 2024    Name: Manav Adam    MRN: [id number]         : 1985      Pt admit to [facility] for Venofer ambulatory in stable condition. Assessment completed and documented in flowsheets. PIV placed to left AC by Francesca Rush RN. Positive blood return noted. Pt states she has been having \"flutters\"/palpitations for the last 6 months and states her PCP is aware. MD office notified, and ok to give Venofer per Alek Kemp NP.    Ms. Adam's vitals were reviewed prior to and after treatment.   Patient Vitals for the past 12 hrs:   Temp Pulse Resp BP   24 1403 -- 69 -- 127/73   24 1320 97.7 °F (36.5 °C) 72 18 (!) 155/85     Medications given: via PIV  Medications Administered         iron sucrose (VENOFER) injection 200 mg Admin Date  2024 Action  Given Dose  200 mg Route  IntraVENous Documented By  Rosario Ordonez RN          PIV positive blood return noted, flushed and removed prior to discharge.    Ms. Adam tolerated the infusion, and had no complaints. Patient remained in [facility] for 30 minute observation with no complication.    [facility]. [facility] and is aware of future appointments.     Future Appointments   Date Time Provider Department Center   2024 11:30 AM [facility] MED INF CHAIR 6 [facility]   2024  1:00 PM [facility] MED [facility]F CHAIR 2 [facility]   2024  1:00 PM [facility] 2 [facility]   2025  1:00 PM [facility]F CH[facility]IR 2 [facility]       ROSARIO ORDONEZ RN  2024  4:37 PM

## 2024-12-12 ENCOUNTER — TELEPHONE (OUTPATIENT)
Age: 39
End: 2024-12-12

## 2024-12-12 NOTE — TELEPHONE ENCOUNTER
Called patient per staff message to assist with venofer schedule. Per patient moved appts out 1 week and added 1 additional appt. Also changed the times to 2:00 on 12/30 and 1/6 per patient request.

## 2024-12-16 ENCOUNTER — HOSPITAL ENCOUNTER (OUTPATIENT)
Facility: HOSPITAL | Age: 39
Setting detail: INFUSION SERIES
End: 2024-12-16

## 2024-12-23 ENCOUNTER — HOSPITAL ENCOUNTER (OUTPATIENT)
Facility: HOSPITAL | Age: 39
Setting detail: INFUSION SERIES
Discharge: HOME | End: 2024-12-23
Payer: COMMERCIAL

## 2024-12-23 VITALS
TEMPERATURE: 97 F | DIASTOLIC BLOOD PRESSURE: 85 MMHG | SYSTOLIC BLOOD PRESSURE: 134 MMHG | RESPIRATION RATE: 16 BRPM | HEART RATE: 76 BPM

## 2024-12-23 DIAGNOSIS — D50.9 IRON DEFICIENCY ANEMIA, UNSPECIFIED IRON DEFICIENCY ANEMIA TYPE: Primary | ICD-10-CM

## 2024-12-23 PROCEDURE — 6360000002 HC RX W HCPCS

## 2024-12-23 PROCEDURE — 96374 THER/PROPH/DIAG INJ IV PUSH: CPT

## 2024-12-23 RX ORDER — HEPARIN 100 UNIT/ML
500 SYRINGE INTRAVENOUS PRN
Status: CANCELLED | OUTPATIENT
Start: 2024-12-24

## 2024-12-23 RX ORDER — DIPHENHYDRAMINE HYDROCHLORIDE 50 MG/ML
50 INJECTION INTRAMUSCULAR; INTRAVENOUS
Status: CANCELLED | OUTPATIENT
Start: 2024-12-24

## 2024-12-23 RX ORDER — ALBUTEROL SULFATE 90 UG/1
4 INHALANT RESPIRATORY (INHALATION) PRN
Status: CANCELLED | OUTPATIENT
Start: 2024-12-24

## 2024-12-23 RX ORDER — ACETAMINOPHEN 325 MG/1
650 TABLET ORAL
Status: CANCELLED | OUTPATIENT
Start: 2024-12-24

## 2024-12-23 RX ORDER — HYDROCORTISONE SODIUM SUCCINATE 100 MG/2ML
100 INJECTION INTRAMUSCULAR; INTRAVENOUS
Status: CANCELLED | OUTPATIENT
Start: 2024-12-24

## 2024-12-23 RX ORDER — SODIUM CHLORIDE 9 MG/ML
5-250 INJECTION, SOLUTION INTRAVENOUS PRN
Status: CANCELLED | OUTPATIENT
Start: 2024-12-24

## 2024-12-23 RX ORDER — ONDANSETRON 2 MG/ML
8 INJECTION INTRAMUSCULAR; INTRAVENOUS
Status: CANCELLED | OUTPATIENT
Start: 2024-12-24

## 2024-12-23 RX ORDER — SODIUM CHLORIDE 9 MG/ML
INJECTION, SOLUTION INTRAVENOUS CONTINUOUS
Status: CANCELLED | OUTPATIENT
Start: 2024-12-24

## 2024-12-23 RX ORDER — SODIUM CHLORIDE 9 MG/ML
5-250 INJECTION, SOLUTION INTRAVENOUS PRN
Status: DISCONTINUED | OUTPATIENT
Start: 2024-12-23 | End: 2024-12-24 | Stop reason: HOSPADM

## 2024-12-23 RX ORDER — SODIUM CHLORIDE 0.9 % (FLUSH) 0.9 %
5-40 SYRINGE (ML) INJECTION PRN
Status: CANCELLED | OUTPATIENT
Start: 2024-12-24

## 2024-12-23 RX ORDER — EPINEPHRINE 1 MG/ML
0.3 INJECTION, SOLUTION INTRAMUSCULAR; SUBCUTANEOUS PRN
Status: CANCELLED | OUTPATIENT
Start: 2024-12-24

## 2024-12-23 RX ADMIN — IRON SUCROSE 200 MG: 20 INJECTION, SOLUTION INTRAVENOUS at 13:13

## 2024-12-23 NOTE — PROGRESS NOTES
[facility] - Chemotherapy Progress Note    1300 Pt admit to [facility] for Venofer 2/5 ambulatory in stable condition. Assessment completed. No new concerns voiced. PIV access established with positive blood return. Line flushed.     /85   Pulse 76   Temp 97 °F (36.1 °C) (Temporal)   Resp 16     Medications Administered         iron sucrose (VENOFER) injection 200 mg Admin Date  12/23/2024 Action  Given Dose  200 mg Route  IntraVENous Documented By  Josette De Paz, RN            1330 Pt tolerated treatment well. PIV removed at discharge. D/c home ambulatory in no distress. Pt aware of next [facility] appointment scheduled for 12/30/2024.

## 2024-12-30 ENCOUNTER — HOSPITAL ENCOUNTER (OUTPATIENT)
Facility: HOSPITAL | Age: 39
Setting detail: INFUSION SERIES
Discharge: HOME | End: 2024-12-30
Payer: COMMERCIAL

## 2024-12-30 ENCOUNTER — APPOINTMENT (OUTPATIENT)
Facility: HOSPITAL | Age: 39
End: 2024-12-30
Payer: COMMERCIAL

## 2024-12-30 VITALS
RESPIRATION RATE: 16 BRPM | HEART RATE: 70 BPM | SYSTOLIC BLOOD PRESSURE: 134 MMHG | TEMPERATURE: 98.2 F | DIASTOLIC BLOOD PRESSURE: 84 MMHG | OXYGEN SATURATION: 98 %

## 2024-12-30 DIAGNOSIS — D50.9 IRON DEFICIENCY ANEMIA, UNSPECIFIED IRON DEFICIENCY ANEMIA TYPE: Primary | ICD-10-CM

## 2024-12-30 PROCEDURE — 6360000002 HC RX W HCPCS

## 2024-12-30 PROCEDURE — 96374 THER/PROPH/DIAG INJ IV PUSH: CPT

## 2024-12-30 RX ORDER — SODIUM CHLORIDE 9 MG/ML
INJECTION, SOLUTION INTRAVENOUS CONTINUOUS
OUTPATIENT
Start: 2024-12-31

## 2024-12-30 RX ORDER — ONDANSETRON 2 MG/ML
8 INJECTION INTRAMUSCULAR; INTRAVENOUS
Status: DISCONTINUED | OUTPATIENT
Start: 2024-12-30 | End: 2024-12-31 | Stop reason: HOSPADM

## 2024-12-30 RX ORDER — SODIUM CHLORIDE 9 MG/ML
5-250 INJECTION, SOLUTION INTRAVENOUS PRN
OUTPATIENT
Start: 2024-12-31

## 2024-12-30 RX ORDER — SODIUM CHLORIDE 0.9 % (FLUSH) 0.9 %
5-40 SYRINGE (ML) INJECTION PRN
Status: DISCONTINUED | OUTPATIENT
Start: 2024-12-30 | End: 2024-12-31 | Stop reason: HOSPADM

## 2024-12-30 RX ORDER — ACETAMINOPHEN 325 MG/1
650 TABLET ORAL
OUTPATIENT
Start: 2024-12-31

## 2024-12-30 RX ORDER — EPINEPHRINE 1 MG/ML
0.3 INJECTION, SOLUTION INTRAMUSCULAR; SUBCUTANEOUS PRN
Status: DISCONTINUED | OUTPATIENT
Start: 2024-12-30 | End: 2024-12-31 | Stop reason: HOSPADM

## 2024-12-30 RX ORDER — DIPHENHYDRAMINE HYDROCHLORIDE 50 MG/ML
50 INJECTION INTRAMUSCULAR; INTRAVENOUS
OUTPATIENT
Start: 2024-12-31

## 2024-12-30 RX ORDER — HYDROCORTISONE SODIUM SUCCINATE 100 MG/2ML
100 INJECTION INTRAMUSCULAR; INTRAVENOUS
Status: DISCONTINUED | OUTPATIENT
Start: 2024-12-30 | End: 2024-12-31 | Stop reason: HOSPADM

## 2024-12-30 RX ORDER — SODIUM CHLORIDE 9 MG/ML
5-250 INJECTION, SOLUTION INTRAVENOUS PRN
Status: DISCONTINUED | OUTPATIENT
Start: 2024-12-30 | End: 2024-12-31 | Stop reason: HOSPADM

## 2024-12-30 RX ORDER — EPINEPHRINE 1 MG/ML
0.3 INJECTION, SOLUTION INTRAMUSCULAR; SUBCUTANEOUS PRN
OUTPATIENT
Start: 2024-12-31

## 2024-12-30 RX ORDER — SODIUM CHLORIDE 0.9 % (FLUSH) 0.9 %
5-40 SYRINGE (ML) INJECTION PRN
OUTPATIENT
Start: 2024-12-31

## 2024-12-30 RX ORDER — ALBUTEROL SULFATE 90 UG/1
4 INHALANT RESPIRATORY (INHALATION) PRN
Status: DISCONTINUED | OUTPATIENT
Start: 2024-12-30 | End: 2024-12-31 | Stop reason: HOSPADM

## 2024-12-30 RX ORDER — DIPHENHYDRAMINE HYDROCHLORIDE 50 MG/ML
50 INJECTION INTRAMUSCULAR; INTRAVENOUS
Status: DISCONTINUED | OUTPATIENT
Start: 2024-12-30 | End: 2024-12-31 | Stop reason: HOSPADM

## 2024-12-30 RX ORDER — SODIUM CHLORIDE 9 MG/ML
INJECTION, SOLUTION INTRAVENOUS CONTINUOUS
Status: DISCONTINUED | OUTPATIENT
Start: 2024-12-30 | End: 2024-12-31 | Stop reason: HOSPADM

## 2024-12-30 RX ORDER — HEPARIN 100 UNIT/ML
500 SYRINGE INTRAVENOUS PRN
OUTPATIENT
Start: 2024-12-31

## 2024-12-30 RX ORDER — ACETAMINOPHEN 325 MG/1
650 TABLET ORAL
Status: DISCONTINUED | OUTPATIENT
Start: 2024-12-30 | End: 2024-12-31 | Stop reason: HOSPADM

## 2024-12-30 RX ORDER — HEPARIN 100 UNIT/ML
500 SYRINGE INTRAVENOUS PRN
Status: DISCONTINUED | OUTPATIENT
Start: 2024-12-30 | End: 2024-12-31 | Stop reason: HOSPADM

## 2024-12-30 RX ORDER — ALBUTEROL SULFATE 90 UG/1
4 INHALANT RESPIRATORY (INHALATION) PRN
OUTPATIENT
Start: 2024-12-31

## 2024-12-30 RX ORDER — ONDANSETRON 2 MG/ML
8 INJECTION INTRAMUSCULAR; INTRAVENOUS
OUTPATIENT
Start: 2024-12-31

## 2024-12-30 RX ORDER — HYDROCORTISONE SODIUM SUCCINATE 100 MG/2ML
100 INJECTION INTRAMUSCULAR; INTRAVENOUS
OUTPATIENT
Start: 2024-12-31

## 2024-12-30 RX ADMIN — IRON SUCROSE 200 MG: 20 INJECTION, SOLUTION INTRAVENOUS at 14:24

## 2024-12-30 ASSESSMENT — PAIN SCALES - GENERAL: PAINLEVEL_OUTOF10: 0

## 2024-12-30 NOTE — PROGRESS NOTES
[facility] Short Note                       Date: 2024    Name: Manav Adam    MRN: [id number]         : 1985      1400 Pt admit to [facility] for Venofer 3/ ambulatory in stable condition. Assessment completed. No new concerns voiced.  PIV established R AC pos blood return noted    Ms. Adam's vitals were reviewed prior to and after treatment.   Patient Vitals for the past 12 hrs:   Temp Pulse Resp BP SpO2   24 1415 -- -- -- 134/84 --   24 1345 98.2 °F (36.8 °C) 70 16 130/77 98 %         Medications given:   Medications Administered         iron sucrose (VENOFER) injection 200 mg Admin Date  2024 Action  Given Dose  200 mg Route  IntraVENous Documented By  Zulema Goodman, VERO        PIV removed      Ms. Adam tolerated the infusion, and had no complaints.    Ms. Adam was discharged [facility] in stable condition. Pt aware next appt    Future Appointments   Date Time Provider Department Center   2025  2:00 PM [facility] MED INF CHAIR 5 [facility]   2025  2:00 PM [facility] MED INF CHAIR 5 [facility]       Zulema Goodman RN  2024  2:27 PM

## 2025-01-06 ENCOUNTER — APPOINTMENT (OUTPATIENT)
Facility: HOSPITAL | Age: 40
End: 2025-01-06
Payer: COMMERCIAL

## 2025-01-06 ENCOUNTER — HOSPITAL ENCOUNTER (OUTPATIENT)
Facility: HOSPITAL | Age: 40
Setting detail: INFUSION SERIES
End: 2025-01-06

## 2025-01-13 ENCOUNTER — HOSPITAL ENCOUNTER (OUTPATIENT)
Facility: HOSPITAL | Age: 40
Setting detail: INFUSION SERIES
Discharge: HOME | End: 2025-01-13
Payer: COMMERCIAL

## 2025-01-13 VITALS
RESPIRATION RATE: 16 BRPM | HEART RATE: 67 BPM | OXYGEN SATURATION: 99 % | SYSTOLIC BLOOD PRESSURE: 121 MMHG | TEMPERATURE: 98.5 F | DIASTOLIC BLOOD PRESSURE: 81 MMHG

## 2025-01-13 DIAGNOSIS — D50.9 IRON DEFICIENCY ANEMIA, UNSPECIFIED IRON DEFICIENCY ANEMIA TYPE: Primary | ICD-10-CM

## 2025-01-13 PROCEDURE — 96374 THER/PROPH/DIAG INJ IV PUSH: CPT

## 2025-01-13 PROCEDURE — 6360000002 HC RX W HCPCS

## 2025-01-13 RX ORDER — SODIUM CHLORIDE 0.9 % (FLUSH) 0.9 %
5-40 SYRINGE (ML) INJECTION PRN
Status: DISCONTINUED | OUTPATIENT
Start: 2025-01-13 | End: 2025-01-14 | Stop reason: HOSPADM

## 2025-01-13 RX ORDER — ONDANSETRON 2 MG/ML
8 INJECTION INTRAMUSCULAR; INTRAVENOUS
OUTPATIENT
Start: 2025-01-14

## 2025-01-13 RX ORDER — ALBUTEROL SULFATE 90 UG/1
4 INHALANT RESPIRATORY (INHALATION) PRN
OUTPATIENT
Start: 2025-01-14

## 2025-01-13 RX ORDER — ACETAMINOPHEN 325 MG/1
650 TABLET ORAL
OUTPATIENT
Start: 2025-01-14

## 2025-01-13 RX ORDER — HEPARIN 100 UNIT/ML
500 SYRINGE INTRAVENOUS PRN
OUTPATIENT
Start: 2025-01-14

## 2025-01-13 RX ORDER — SODIUM CHLORIDE 9 MG/ML
5-250 INJECTION, SOLUTION INTRAVENOUS PRN
OUTPATIENT
Start: 2025-01-14

## 2025-01-13 RX ORDER — DIPHENHYDRAMINE HYDROCHLORIDE 50 MG/ML
50 INJECTION INTRAMUSCULAR; INTRAVENOUS
OUTPATIENT
Start: 2025-01-14

## 2025-01-13 RX ORDER — HYDROCORTISONE SODIUM SUCCINATE 100 MG/2ML
100 INJECTION INTRAMUSCULAR; INTRAVENOUS
OUTPATIENT
Start: 2025-01-14

## 2025-01-13 RX ORDER — SODIUM CHLORIDE 0.9 % (FLUSH) 0.9 %
5-40 SYRINGE (ML) INJECTION PRN
Status: CANCELLED | OUTPATIENT
Start: 2025-01-14

## 2025-01-13 RX ORDER — SODIUM CHLORIDE 9 MG/ML
5-250 INJECTION, SOLUTION INTRAVENOUS PRN
Status: CANCELLED | OUTPATIENT
Start: 2025-01-14

## 2025-01-13 RX ORDER — EPINEPHRINE 1 MG/ML
0.3 INJECTION, SOLUTION INTRAMUSCULAR; SUBCUTANEOUS PRN
OUTPATIENT
Start: 2025-01-14

## 2025-01-13 RX ORDER — SODIUM CHLORIDE 9 MG/ML
INJECTION, SOLUTION INTRAVENOUS CONTINUOUS
OUTPATIENT
Start: 2025-01-14

## 2025-01-13 RX ADMIN — IRON SUCROSE 200 MG: 20 INJECTION, SOLUTION INTRAVENOUS at 14:19

## 2025-01-13 NOTE — PROGRESS NOTES
[facility] Progress Note    Date: 2025    Name: Manav Adam    MRN: [id number]         : 1985    Ms. Adam Arrived ambulatory and in no distress for Venofer.      Assessment was completed and documented in flowsheets. No acute concerns at this time. 24G IV placed without difficulty, positive blood return noted.    Ms. Adam's vital signs for this visit taken prior to and post treatment. Patient kindly refused 30 minute observation period.  Patient Vitals for the past 24 hrs:   BP Temp Temp src Pulse Resp SpO2   25 1424 121/81 -- -- 67 -- --   25 1400 121/60 98.5 °F (36.9 °C) Temporal 72 16 99 %       Medications given:   Medications Administered         iron sucrose (VENOFER) injection 200 mg Admin Date  2025 Action  Given Dose  200 mg Route  IntraVENous Documented By  Kristen Puente, RN          Ms. Adam tolerated the infusion, and had no complaints.    PIV flushed and removed after completion of infusion. 2x2 and coban placed.     Ms. Adam was [facility] in [facility] condition and is aware of future appointments.     Future Appointments   Date Time Provider Department Center   2025 11:30 AM [facility] INF CHAIR 6 [facility]       KRISTEN PUENTE RN  2025  2:40 PM

## 2025-01-20 ENCOUNTER — HOSPITAL ENCOUNTER (OUTPATIENT)
Facility: HOSPITAL | Age: 40
Setting detail: INFUSION SERIES
Discharge: HOME | End: 2025-01-20
Payer: COMMERCIAL

## 2025-01-20 VITALS
RESPIRATION RATE: 18 BRPM | HEART RATE: 67 BPM | SYSTOLIC BLOOD PRESSURE: 140 MMHG | DIASTOLIC BLOOD PRESSURE: 88 MMHG | TEMPERATURE: 98 F

## 2025-01-20 DIAGNOSIS — D50.9 IRON DEFICIENCY ANEMIA, UNSPECIFIED IRON DEFICIENCY ANEMIA TYPE: Primary | ICD-10-CM

## 2025-01-20 PROCEDURE — 96374 THER/PROPH/DIAG INJ IV PUSH: CPT

## 2025-01-20 PROCEDURE — 6360000002 HC RX W HCPCS

## 2025-01-20 RX ORDER — HEPARIN 100 UNIT/ML
500 SYRINGE INTRAVENOUS PRN
OUTPATIENT
Start: 2025-01-20

## 2025-01-20 RX ORDER — ACETAMINOPHEN 325 MG/1
650 TABLET ORAL
OUTPATIENT
Start: 2025-01-20

## 2025-01-20 RX ORDER — SODIUM CHLORIDE 9 MG/ML
5-250 INJECTION, SOLUTION INTRAVENOUS PRN
OUTPATIENT
Start: 2025-01-20

## 2025-01-20 RX ORDER — SODIUM CHLORIDE 0.9 % (FLUSH) 0.9 %
5-40 SYRINGE (ML) INJECTION PRN
OUTPATIENT
Start: 2025-01-20

## 2025-01-20 RX ORDER — SODIUM CHLORIDE 0.9 % (FLUSH) 0.9 %
5-40 SYRINGE (ML) INJECTION PRN
Status: DISCONTINUED | OUTPATIENT
Start: 2025-01-20 | End: 2025-01-21 | Stop reason: HOSPADM

## 2025-01-20 RX ORDER — DIPHENHYDRAMINE HYDROCHLORIDE 50 MG/ML
50 INJECTION INTRAMUSCULAR; INTRAVENOUS
OUTPATIENT
Start: 2025-01-20

## 2025-01-20 RX ORDER — EPINEPHRINE 1 MG/ML
0.3 INJECTION, SOLUTION INTRAMUSCULAR; SUBCUTANEOUS PRN
OUTPATIENT
Start: 2025-01-20

## 2025-01-20 RX ORDER — ALBUTEROL SULFATE 90 UG/1
4 INHALANT RESPIRATORY (INHALATION) PRN
OUTPATIENT
Start: 2025-01-20

## 2025-01-20 RX ORDER — HYDROCORTISONE SODIUM SUCCINATE 100 MG/2ML
100 INJECTION INTRAMUSCULAR; INTRAVENOUS
OUTPATIENT
Start: 2025-01-20

## 2025-01-20 RX ORDER — ONDANSETRON 2 MG/ML
8 INJECTION INTRAMUSCULAR; INTRAVENOUS
OUTPATIENT
Start: 2025-01-20

## 2025-01-20 RX ORDER — SODIUM CHLORIDE 9 MG/ML
5-250 INJECTION, SOLUTION INTRAVENOUS PRN
Status: DISCONTINUED | OUTPATIENT
Start: 2025-01-20 | End: 2025-01-21 | Stop reason: HOSPADM

## 2025-01-20 RX ORDER — SODIUM CHLORIDE 9 MG/ML
INJECTION, SOLUTION INTRAVENOUS CONTINUOUS
OUTPATIENT
Start: 2025-01-20

## 2025-01-20 RX ADMIN — IRON SUCROSE 200 MG: 20 INJECTION, SOLUTION INTRAVENOUS at 11:35

## 2025-01-20 ASSESSMENT — PAIN SCALES - GENERAL: PAINLEVEL_OUTOF10: 0

## 2025-01-20 NOTE — PROGRESS NOTES
[facility] Short Note                   Date: 2025    Name: Manav Adam    MRN: [id number]         : 1985      Pt admit to [facility] for Venofer () ambulatory in stable condition. Assessment completed and documented in flowsheets. PIV placed to right AC with positive blood return.    Ms. Adam's vitals were reviewed prior to and after treatment.   Patient Vitals for the past 12 hrs:   Temp Pulse Resp BP   25 1142 -- 67 -- (!) 140/88   25 1123 98 °F (36.7 °C) 64 18 131/83       Medications given: via PIV  Medications Administered         iron sucrose (VENOFER) injection 200 mg Admin Date  2025 Action  Given Dose  200 mg Route  IntraVENous Documented By  Francesca Rush RN             PIV was flushed and removed per protocol prior to discharge. PIV remained positive blood return throughout treatment.    Ms. Adam tolerated the infusion and had no complaints. Patient politely declined to remain in [facility] for full 30 minute observation period.     Ms. Adam was discharged [facility] in stable condition.      Francesca Rush RN  2025  11:48 AM

## 2025-01-27 ENCOUNTER — TELEPHONE (OUTPATIENT)
Dept: PRIMARY CARE CLINIC | Facility: CLINIC | Age: 40
End: 2025-01-27

## 2025-01-27 NOTE — TELEPHONE ENCOUNTER
----- Message from Suzy CLINE sent at 1/27/2025  9:14 AM EST -----  Regarding: [facility] Appointment Request  [facility] Appointment Request    Patient needs appointment for [facility] Appointment Type: New to Provider.    Patient Requested Dates(s):1/30/2025 or 1/31/2025  Patient Requested Time:any time  Provider Name:any provider    Reason for Appointment Request: New Patient - Available appointments did not meet patient need  --------------------------------------------------------------------------------------------------------------------------    Relationship to Patient: Self     Call Back Information: OK to leave message on voicemail  Preferred Call Back Number: Phone [phone] (home) '

## 2025-02-05 ENCOUNTER — OFFICE VISIT (OUTPATIENT)
Dept: PRIMARY CARE CLINIC | Facility: CLINIC | Age: 40
End: 2025-02-05
Payer: COMMERCIAL

## 2025-02-05 ENCOUNTER — HOSPITAL ENCOUNTER (OUTPATIENT)
Facility: HOSPITAL | Age: 40
Discharge: HOME | End: 2025-02-08
Payer: COMMERCIAL

## 2025-02-05 VITALS
TEMPERATURE: 97.8 F | SYSTOLIC BLOOD PRESSURE: 137 MMHG | DIASTOLIC BLOOD PRESSURE: 81 MMHG | OXYGEN SATURATION: 99 % | WEIGHT: 184.6 LBS | HEART RATE: 72 BPM | BODY MASS INDEX: 32.71 KG/M2 | RESPIRATION RATE: 17 BRPM | HEIGHT: 63 IN

## 2025-02-05 DIAGNOSIS — G89.29 CHRONIC PAIN OF LEFT THUMB: Primary | ICD-10-CM

## 2025-02-05 DIAGNOSIS — M79.645 CHRONIC PAIN OF LEFT THUMB: ICD-10-CM

## 2025-02-05 DIAGNOSIS — M79.645 CHRONIC PAIN OF LEFT THUMB: Primary | ICD-10-CM

## 2025-02-05 DIAGNOSIS — G89.29 CHRONIC PAIN OF LEFT THUMB: ICD-10-CM

## 2025-02-05 PROCEDURE — 73130 X-RAY EXAM OF HAND: CPT

## 2025-02-05 PROCEDURE — 99213 OFFICE O/P EST LOW 20 MIN: CPT

## 2025-02-05 SDOH — ECONOMIC STABILITY: FOOD INSECURITY: WITHIN THE PAST 12 MONTHS, YOU WORRIED THAT YOUR FOOD WOULD RUN OUT BEFORE YOU GOT MONEY TO BUY MORE.: NEVER TRUE

## 2025-02-05 SDOH — ECONOMIC STABILITY: FOOD INSECURITY: WITHIN THE PAST 12 MONTHS, THE FOOD YOU BOUGHT JUST DIDN'T LAST AND YOU DIDN'T HAVE MONEY TO GET MORE.: NEVER TRUE

## 2025-02-05 ASSESSMENT — PATIENT HEALTH QUESTIONNAIRE - PHQ9
SUM OF ALL RESPONSES TO PHQ QUESTIONS 1-9: 0
2. FEELING DOWN, DEPRESSED OR HOPELESS: NOT AT ALL
SUM OF ALL RESPONSES TO PHQ9 QUESTIONS 1 & 2: 0
SUM OF ALL RESPONSES TO PHQ QUESTIONS 1-9: 0
1. LITTLE INTEREST OR PLEASURE IN DOING THINGS: NOT AT ALL

## 2025-02-05 ASSESSMENT — ENCOUNTER SYMPTOMS
NAUSEA: 0
CONSTIPATION: 0
SHORTNESS OF BREATH: 0
COUGH: 0
BLOOD IN STOOL: 0
DIARRHEA: 0
VOMITING: 0
ABDOMINAL PAIN: 0
WHEEZING: 0

## 2025-02-05 NOTE — PROGRESS NOTES
[facility]   [address], [address]  [place] [address]  P: [phone]  F: [phone]    SUBJECTIVE     HPI:     Manav Adam is a 40 y.o. female who is seen in the clinic for   Chief Complaint   Patient presents with    Finger Pain     Left thumb and has been going on for a month. It is hard to  things and there's a burning sensation above the finger.        The patient presents to the office today c/o of left thumb pain    She hit her thumb about 1 month ago when moving furniture. Her thumb was between the furniture and a door. Pain has been worsening and now has a burning sensation over the thumb, especially at night. She has pain with squeezing things. Also noticed swelling. Has tried OTC analgesics without significant improvement. Pain presents when gripping and turning. Also affecting her writing. Pain at rest is 3/10, burning. With use pain is 5/10, pressure. No numbness. She is left handed.       Patient Active Problem List   Diagnosis    Abdominal pain    Class 1 obesity due to excess calories without serious comorbidity with body mass index (BMI) of 32.0 to 32.9 in adult    S/P gastric bypass    Masses of both breasts    Adjustment insomnia    Short attention span    Anemia    Internal hernia    Iron deficiency    Iron deficiency anemia        Past Medical History:   Diagnosis Date    Anemia     Nausea & vomiting     Transient elevated blood pressure      Current Outpatient Medications   Medication Sig Dispense Refill    NONFORMULARY SLEEP AID      diclofenac sodium (VOLTAREN) 1 % GEL Apply 4 g topically 4 times daily 50 g 0    vitamin D3 (CHOLECALCIFEROL) 125 MCG (5000 UT) TABS tablet Take by mouth daily      ferrous sulfate (IRON 325) 325 (65 Fe) MG tablet Take by mouth every morning (before breakfast)       No current facility-administered medications for this visit.     Allergies   Allergen Reactions    Crab (Diagnostic) Hives    Iodinated Contrast Media Hives

## 2025-05-28 ENCOUNTER — OFFICE VISIT (OUTPATIENT)
Dept: PRIMARY CARE CLINIC | Facility: CLINIC | Age: 40
End: 2025-05-28
Payer: COMMERCIAL

## 2025-05-28 VITALS
TEMPERATURE: 97.3 F | BODY MASS INDEX: 32.6 KG/M2 | DIASTOLIC BLOOD PRESSURE: 83 MMHG | HEART RATE: 67 BPM | WEIGHT: 184 LBS | OXYGEN SATURATION: 99 % | RESPIRATION RATE: 17 BRPM | SYSTOLIC BLOOD PRESSURE: 140 MMHG | HEIGHT: 63 IN

## 2025-05-28 DIAGNOSIS — M54.12 CERVICAL RADICULOPATHY: ICD-10-CM

## 2025-05-28 DIAGNOSIS — R03.0 ELEVATED BP WITHOUT DIAGNOSIS OF HYPERTENSION: ICD-10-CM

## 2025-05-28 DIAGNOSIS — M62.830 SPASM OF THORACIC BACK MUSCLE: Primary | ICD-10-CM

## 2025-05-28 DIAGNOSIS — R20.2 PARESTHESIA OF RIGHT ARM: ICD-10-CM

## 2025-05-28 DIAGNOSIS — M62.838 NECK MUSCLE SPASM: ICD-10-CM

## 2025-05-28 PROCEDURE — 99213 OFFICE O/P EST LOW 20 MIN: CPT

## 2025-05-28 RX ORDER — CYCLOBENZAPRINE HCL 10 MG
10 TABLET ORAL 3 TIMES DAILY PRN
Qty: 21 TABLET | Refills: 0 | Status: SHIPPED | OUTPATIENT
Start: 2025-05-28 | End: 2025-06-07

## 2025-05-28 SDOH — ECONOMIC STABILITY: FOOD INSECURITY: WITHIN THE PAST 12 MONTHS, YOU WORRIED THAT YOUR FOOD WOULD RUN OUT BEFORE YOU GOT MONEY TO BUY MORE.: NEVER TRUE

## 2025-05-28 SDOH — ECONOMIC STABILITY: FOOD INSECURITY: WITHIN THE PAST 12 MONTHS, THE FOOD YOU BOUGHT JUST DIDN'T LAST AND YOU DIDN'T HAVE MONEY TO GET MORE.: NEVER TRUE

## 2025-05-28 ASSESSMENT — ENCOUNTER SYMPTOMS
WHEEZING: 0
COUGH: 0
SHORTNESS OF BREATH: 0

## 2025-05-28 NOTE — PROGRESS NOTES
Health Decision Maker has been checked with the patient          Chief Complaint   Patient presents with    Numbness     Tingling and numbness in right arm for 2 weeks.       \"Have you been to the ER, urgent care clinic since your last visit?  Hospitalized since your last visit?\"    NO    “Have you seen or consulted any other health care providers outside of [facility] since your last visit?”    NO      Vitals:    05/28/25 1346   Resp: 17   Temp: 97.3 °F (36.3 °C)   TempSrc: Temporal   Weight: 83.5 kg (184 lb)   Height: 1.6 m (5' 3\")          “Have you had a pap smear?”      Yes, [facility]    Date of last Cervical Cancer screen (HPV or PAP): 1/29/2020             Click Here for Release of Records Request

## 2025-05-28 NOTE — PROGRESS NOTES
[facility]   [address], [address]  [place] [address]  P: [phone]  F: [phone]    SUBJECTIVE     HPI:     Manav Adam is a 40 y.o. female who is seen in the clinic for   Chief Complaint   Patient presents with    Numbness     Tingling and numbness in right arm for 2 weeks.        The patient presents to the office today c/o of right arm tingling    Reports intermittent right arm tingling and numbness for 2 weeks. When symptoms started it felt like she had to shake it as it was asleep. The sensation lasts about 10-15 seconds, but can occur as frequently as once every hour. Starts in the wrist/hand area and goes up past her elbow but not to her shoulder. Feels like pin and needs and decreased sensation. No known injuries. Denies neck pain. No history of similar symptoms. No decreased strength. No known triggering or relieving factors. Has been taking OTC ibuprofen and Tylenol for tooth ache but no improvement in paresthesias. She is left handed.    Patient Active Problem List   Diagnosis    Abdominal pain    Class 1 obesity due to excess calories without serious comorbidity with body mass index (BMI) of 32.0 to 32.9 in adult    S/P gastric bypass    Masses of both breasts    Adjustment insomnia    Short attention span    Anemia    Internal hernia    Iron deficiency    Iron deficiency anemia        Past Medical History:   Diagnosis Date    Anemia     Nausea & vomiting     Transient elevated blood pressure      Current Outpatient Medications   Medication Sig Dispense Refill    cyclobenzaprine (FLEXERIL) 10 MG tablet Take 1 tablet by mouth 3 times daily as needed for Muscle spasms 21 tablet 0    NONFORMULARY SLEEP AID      diclofenac sodium (VOLTAREN) 1 % GEL Apply 4 g topically 4 times daily 50 g 0    vitamin D3 (CHOLECALCIFEROL) 125 MCG (5000 UT) TABS tablet Take by mouth daily      ferrous sulfate (IRON 325) 325 (65 Fe) MG tablet Take by mouth every morning (before breakfast)

## 2025-06-18 ENCOUNTER — TELEPHONE (OUTPATIENT)
Dept: PRIMARY CARE CLINIC | Facility: CLINIC | Age: 40
End: 2025-06-18

## 2025-06-18 NOTE — TELEPHONE ENCOUNTER
Patient sent in [employer] message stating: I’m still having the same numb/tingly feeling in my right arm daily and blood pressure is still high according to my home blood pressure machine.      What should I do?      Manav Adam   [phone]    I called patient to see what her blood pressure was reading and how her arm was doing.     she is still having the numbness and tingling from her right arm. her bp has been running high at home, 155/94, 151/108, 171/100. no headaches, no vision changes.     Sent provider a secure chat message letting her know symptoms and she would like to see her and evaluate her again. I called patient X2 however she did not . I sent her a [employer] message asking if she could come into the office today.

## (undated) DEVICE — SUTURE PDS II SZ 1 L36IN ABSRB VLT L48MM CTX 1/2 CIR Z371T

## (undated) DEVICE — VISUALIZATION SYSTEM: Brand: CLEARIFY

## (undated) DEVICE — INFECTION CONTROL KIT SYS

## (undated) DEVICE — SURGICAL PROCEDURE KIT GEN LAPAROSCOPY LF

## (undated) DEVICE — SUTURE SZ 0 27IN 5/8 CIR UR-6  TAPER PT VIOLET ABSRB VICRYL J603H

## (undated) DEVICE — PREP SKN CHLRAPRP APL 26ML STR --

## (undated) DEVICE — STRAP,POSITIONING,KNEE/BODY,FOAM,4X60": Brand: MEDLINE

## (undated) DEVICE — SUTURE PDS II SZ 4-0 L27IN ABSRB VLT L17MM RB-1 1/2 CIR Z304H

## (undated) DEVICE — POWER SHELL: Brand: SIGNIA

## (undated) DEVICE — WOUND RETRACTOR AND PROTECTOR: Brand: ALEXIS WOUND PROTECTOR-RETRACTOR

## (undated) DEVICE — FILTER SMK EVAC FLO CLR MEGADYNE

## (undated) DEVICE — Device

## (undated) DEVICE — ARTICULATING RELOAD WITH TRI-STAPLE TECHNOLOGY: Brand: ENDO GIA

## (undated) DEVICE — SUT ETHLN 3-0 18IN PS2 BLK --

## (undated) DEVICE — TOTAL TRAY, DB, 100% SILI FOLEY, 16FR 10: Brand: MEDLINE

## (undated) DEVICE — SUTURE VCRL SZ 2-0 L27IN ABSRB VLT L26MM SH 1/2 CIR J317H

## (undated) DEVICE — SUTURE MCRYL SZ 4-0 L27IN ABSRB UD L19MM PS-2 1/2 CIR PRIM Y426H

## (undated) DEVICE — STERILE POLYISOPRENE POWDER-FREE SURGICAL GLOVES WITH EMOLLIENT COATING: Brand: PROTEXIS

## (undated) DEVICE — REM POLYHESIVE ADULT PATIENT RETURN ELECTRODE: Brand: VALLEYLAB

## (undated) DEVICE — DISSECTOR ULTRASONIC L39CM CRV JAW CRDLSS SONICISION

## (undated) DEVICE — TROCAR: Brand: KII® OPTICAL ACCESS SYSTEM

## (undated) DEVICE — GARMENT,MEDLINE,DVT,INT,CALF,MED, GEN2: Brand: MEDLINE

## (undated) DEVICE — TUBING, SUCTION, 1/4" X 12', STRAIGHT: Brand: MEDLINE

## (undated) DEVICE — CLICKLINE SCISSORS INSERT: Brand: CLICKLINE

## (undated) DEVICE — TROCAR: Brand: KII® SLEEVE

## (undated) DEVICE — TROCAR SITE CLOSURE DEVICE: Brand: ENDO CLOSE

## (undated) DEVICE — NEEDLE HYPO 22GA L1.5IN BLK S STL HUB POLYPR SHLD REG BVL

## (undated) DEVICE — LAPAROSCOPIC TROCAR SLEEVE/SINGLE USE: Brand: KII® OPTICAL ACCESS SYSTEM

## (undated) DEVICE — ELECTRODE ES 36CM LAP FLAT L HK COAT DISP CLEANCOAT

## (undated) DEVICE — DRAPE,UTILTY,TAPE,15X26, 4EA/PK: Brand: MEDLINE

## (undated) DEVICE — YANKAUER,TAPERED BULBOUS TIP,W/O VENT: Brand: MEDLINE

## (undated) DEVICE — 4-PORT MANIFOLD: Brand: NEPTUNE 2